# Patient Record
Sex: MALE | Race: BLACK OR AFRICAN AMERICAN | Employment: FULL TIME | URBAN - METROPOLITAN AREA
[De-identification: names, ages, dates, MRNs, and addresses within clinical notes are randomized per-mention and may not be internally consistent; named-entity substitution may affect disease eponyms.]

---

## 2017-03-06 ENCOUNTER — APPOINTMENT (OUTPATIENT)
Dept: GENERAL RADIOLOGY | Age: 42
End: 2017-03-06
Payer: SELF-PAY

## 2017-03-06 ENCOUNTER — HOSPITAL ENCOUNTER (EMERGENCY)
Age: 42
Discharge: HOME | End: 2017-03-07
Attending: EMERGENCY MEDICINE
Payer: SELF-PAY

## 2017-03-06 VITALS
HEART RATE: 92 BPM | RESPIRATION RATE: 17 BRPM | WEIGHT: 163 LBS | DIASTOLIC BLOOD PRESSURE: 92 MMHG | OXYGEN SATURATION: 99 % | SYSTOLIC BLOOD PRESSURE: 127 MMHG | TEMPERATURE: 98.2 F | BODY MASS INDEX: 22.08 KG/M2 | HEIGHT: 72 IN

## 2017-03-06 DIAGNOSIS — K65.1 ABSCESS OF MALE PELVIS (HCC): Primary | ICD-10-CM

## 2017-03-06 PROCEDURE — 77030019895 HC PCKNG STRP IODO -A

## 2017-03-06 PROCEDURE — 74011250636 HC RX REV CODE- 250/636: Performed by: PHYSICIAN ASSISTANT

## 2017-03-06 PROCEDURE — 96375 TX/PRO/DX INJ NEW DRUG ADDON: CPT | Performed by: PHYSICIAN ASSISTANT

## 2017-03-06 PROCEDURE — 99283 EMERGENCY DEPT VISIT LOW MDM: CPT | Performed by: PHYSICIAN ASSISTANT

## 2017-03-06 PROCEDURE — 96365 THER/PROPH/DIAG IV INF INIT: CPT | Performed by: PHYSICIAN ASSISTANT

## 2017-03-06 PROCEDURE — 75810000289 HC I&D ABSCESS SIMP/COMP/MULT: Performed by: PHYSICIAN ASSISTANT

## 2017-03-06 PROCEDURE — 73502 X-RAY EXAM HIP UNI 2-3 VIEWS: CPT

## 2017-03-06 RX ORDER — HYDROMORPHONE HYDROCHLORIDE 1 MG/ML
1 INJECTION, SOLUTION INTRAMUSCULAR; INTRAVENOUS; SUBCUTANEOUS
Status: COMPLETED | OUTPATIENT
Start: 2017-03-06 | End: 2017-03-07

## 2017-03-07 LAB
ALBUMIN SERPL BCP-MCNC: 3.4 G/DL (ref 3.5–5)
ALBUMIN/GLOB SERPL: 0.7 {RATIO} (ref 1.2–3.5)
ALP SERPL-CCNC: 83 U/L (ref 50–136)
ALT SERPL-CCNC: 22 U/L (ref 12–65)
ANION GAP BLD CALC-SCNC: 5 MMOL/L (ref 7–16)
AST SERPL W P-5'-P-CCNC: 15 U/L (ref 15–37)
BASOPHILS # BLD AUTO: 0 K/UL (ref 0–0.2)
BASOPHILS # BLD: 0 % (ref 0–2)
BILIRUB SERPL-MCNC: 0.4 MG/DL (ref 0.2–1.1)
BUN SERPL-MCNC: 9 MG/DL (ref 6–23)
CALCIUM SERPL-MCNC: 9.1 MG/DL (ref 8.3–10.4)
CHLORIDE SERPL-SCNC: 106 MMOL/L (ref 98–107)
CO2 SERPL-SCNC: 32 MMOL/L (ref 21–32)
CREAT SERPL-MCNC: 0.93 MG/DL (ref 0.8–1.5)
DIFFERENTIAL METHOD BLD: ABNORMAL
EOSINOPHIL # BLD: 0.2 K/UL (ref 0–0.8)
EOSINOPHIL NFR BLD: 2 % (ref 0.5–7.8)
ERYTHROCYTE [DISTWIDTH] IN BLOOD BY AUTOMATED COUNT: 13.1 % (ref 11.9–14.6)
GLOBULIN SER CALC-MCNC: 4.7 G/DL (ref 2.3–3.5)
GLUCOSE SERPL-MCNC: 88 MG/DL (ref 65–100)
HCT VFR BLD AUTO: 33.6 % (ref 41.1–50.3)
HGB BLD-MCNC: 11 G/DL (ref 13.6–17.2)
IMM GRANULOCYTES # BLD: 0 K/UL (ref 0–0.5)
IMM GRANULOCYTES NFR BLD AUTO: 0.3 % (ref 0–5)
LACTATE BLD-SCNC: 0.6 MMOL/L (ref 0.5–1.9)
LYMPHOCYTES # BLD AUTO: 24 % (ref 13–44)
LYMPHOCYTES # BLD: 2.3 K/UL (ref 0.5–4.6)
MCH RBC QN AUTO: 28 PG (ref 26.1–32.9)
MCHC RBC AUTO-ENTMCNC: 32.7 G/DL (ref 31.4–35)
MCV RBC AUTO: 85.5 FL (ref 79.6–97.8)
MONOCYTES # BLD: 0.7 K/UL (ref 0.1–1.3)
MONOCYTES NFR BLD AUTO: 7 % (ref 4–12)
NEUTS SEG # BLD: 6.5 K/UL (ref 1.7–8.2)
NEUTS SEG NFR BLD AUTO: 67 % (ref 43–78)
PLATELET # BLD AUTO: 311 K/UL (ref 150–450)
PMV BLD AUTO: 10.4 FL (ref 10.8–14.1)
POTASSIUM SERPL-SCNC: 3.5 MMOL/L (ref 3.5–5.1)
PROT SERPL-MCNC: 8.1 G/DL (ref 6.3–8.2)
RBC # BLD AUTO: 3.93 M/UL (ref 4.23–5.67)
SODIUM SERPL-SCNC: 143 MMOL/L (ref 136–145)
WBC # BLD AUTO: 9.6 K/UL (ref 4.3–11.1)

## 2017-03-07 PROCEDURE — 74011250636 HC RX REV CODE- 250/636: Performed by: PHYSICIAN ASSISTANT

## 2017-03-07 PROCEDURE — 83605 ASSAY OF LACTIC ACID: CPT

## 2017-03-07 PROCEDURE — 74011000258 HC RX REV CODE- 258: Performed by: PHYSICIAN ASSISTANT

## 2017-03-07 PROCEDURE — 85025 COMPLETE CBC W/AUTO DIFF WBC: CPT | Performed by: PHYSICIAN ASSISTANT

## 2017-03-07 PROCEDURE — 80053 COMPREHEN METABOLIC PANEL: CPT | Performed by: PHYSICIAN ASSISTANT

## 2017-03-07 RX ORDER — HYDROCODONE BITARTRATE AND ACETAMINOPHEN 5; 325 MG/1; MG/1
1 TABLET ORAL
Qty: 10 TAB | Refills: 0 | Status: SHIPPED | OUTPATIENT
Start: 2017-03-07 | End: 2017-11-06 | Stop reason: CLARIF

## 2017-03-07 RX ORDER — SULFAMETHOXAZOLE AND TRIMETHOPRIM 800; 160 MG/1; MG/1
1 TABLET ORAL 2 TIMES DAILY
Qty: 20 TAB | Refills: 0 | Status: SHIPPED | OUTPATIENT
Start: 2017-03-07 | End: 2017-03-17

## 2017-03-07 RX ADMIN — CEFTRIAXONE 1 G: 1 INJECTION, POWDER, FOR SOLUTION INTRAMUSCULAR; INTRAVENOUS at 00:04

## 2017-03-07 RX ADMIN — HYDROMORPHONE HYDROCHLORIDE 1 MG: 1 INJECTION, SOLUTION INTRAMUSCULAR; INTRAVENOUS; SUBCUTANEOUS at 00:05

## 2017-03-07 NOTE — DISCHARGE INSTRUCTIONS
Leave packing and bandage in place until seen for wound check in 2 days. Return to ER for wound check in 2 days. Sooner if needed. Skin Abscess: Care Instructions  Your Care Instructions    A skin abscess is a bacterial infection that forms a pocket of pus. A boil is a kind of skin abscess. The doctor may have cut an opening in the abscess so that the pus can drain out. You may have gauze in the cut so that the abscess will stay open and keep draining. You may need antibiotics. You will need to follow up with your doctor to make sure the infection has gone away. The doctor has checked you carefully, but problems can develop later. If you notice any problems or new symptoms, get medical treatment right away. Follow-up care is a key part of your treatment and safety. Be sure to make and go to all appointments, and call your doctor if you are having problems. It's also a good idea to know your test results and keep a list of the medicines you take. How can you care for yourself at home? · Apply warm and dry compresses, a heating pad set on low, or a hot water bottle 3 or 4 times a day for pain. Keep a cloth between the heat source and your skin. · If your doctor prescribed antibiotics, take them as directed. Do not stop taking them just because you feel better. You need to take the full course of antibiotics. · Take pain medicines exactly as directed. ¨ If the doctor gave you a prescription medicine for pain, take it as prescribed. ¨ If you are not taking a prescription pain medicine, ask your doctor if you can take an over-the-counter medicine. · Keep your bandage clean and dry. Change the bandage whenever it gets wet or dirty, or at least one time a day. · If the abscess was packed with gauze:  ¨ Keep follow-up appointments to have the gauze changed or removed. If the doctor instructed you to remove the gauze, gently pull out all of the gauze when your doctor tells you to.   ¨ After the gauze is removed, soak the area in warm water for 15 to 20 minutes 2 times a day, until the wound closes. When should you call for help? Call your doctor now or seek immediate medical care if:  · You have signs of worsening infection, such as:  ¨ Increased pain, swelling, warmth, or redness. ¨ Red streaks leading from the infected skin. ¨ Pus draining from the wound. ¨ A fever. Watch closely for changes in your health, and be sure to contact your doctor if:  · You do not get better as expected. Where can you learn more? Go to http://lakshmi-deysi.info/. Enter W465 in the search box to learn more about \"Skin Abscess: Care Instructions. \"  Current as of: February 5, 2016  Content Version: 11.1  © 5311-7222 [employer]. Care instructions adapted under license by [employer] (which disclaims liability or warranty for this information). If you have questions about a medical condition or this instruction, always ask your healthcare professional. [address] any warranty or liability for your use of this information.

## 2017-03-07 NOTE — ED PROVIDER NOTES
HPI Comments: 50-year-old -American male presents with large, inflamed, painful red abscess to his left lateral pelvis area which she states has progressively, rapidly worsened over the last 3 days and is more painful. She denies any chills, fever, nausea or vomiting. Patient is a 37 y.o. male presenting with abscess. The history is provided by the patient. Abscess    This is a new problem. The current episode started more than 2 days ago (3 DAYS AGO). The problem has been rapidly worsening. The problem is associated with an unknown factor. There has been no fever. Affected Location: LEFT LATERAL PELVIS. The pain is at a severity of 10/10. The pain is severe. The pain has been constant since onset. Associated symptoms include pain. Pertinent negatives include no blisters, no itching and no weeping. He has tried nothing for the symptoms. Past Medical History:   Diagnosis Date    GERD (gastroesophageal reflux disease)        Past Surgical History:   Procedure Laterality Date    HX OTHER SURGICAL      hernia          History reviewed. No pertinent family history. Social History     Social History    Marital status:      Spouse name: N/A    Number of children: N/A    Years of education: N/A     Occupational History    Not on file. Social History Main Topics    Smoking status: Current Every Day Smoker     Packs/day: 0.50    Smokeless tobacco: Never Used    Alcohol use No      Comment: seldom    Drug use: Yes     Special: Heroin, Cocaine      Comment: sniffing;  denies IV use    Sexual activity: Not on file     Other Topics Concern    Not on file     Social History Narrative         ALLERGIES: Review of patient's allergies indicates no known allergies. Review of Systems   Constitutional: Negative for chills and fever. Gastrointestinal: Negative for nausea and vomiting. Skin: Negative for itching. Abscess   Neurological: Negative for light-headedness. Psychiatric/Behavioral: The patient is nervous/anxious. Vitals:    03/06/17 2126   BP: (!) 127/92   Pulse: 92   Resp: 17   Temp: 98.2 °F (36.8 °C)   SpO2: 99%   Weight: 73.9 kg (163 lb)   Height: 6' (1.829 m)            Physical Exam   Constitutional: He is oriented to person, place, and time. Vital signs are normal. He appears well-developed and well-nourished. He is active. Non-toxic appearance. He does not appear ill. No distress. Pt. Is ambulatory and moving about in exam room without difficulty. Cardiovascular: Normal rate, regular rhythm and normal heart sounds. Exam reveals no gallop and no friction rub. No murmur heard. Pulmonary/Chest: Effort normal and breath sounds normal. No accessory muscle usage. No respiratory distress. He has no decreased breath sounds. He has no wheezes. He has no rhonchi. Abdominal: Soft. Bowel sounds are normal. He exhibits no distension. There is no tenderness. Musculoskeletal:        Left hip: Normal. He exhibits normal range of motion, no tenderness, no bony tenderness, no swelling, no crepitus and no deformity. Neurological: He is alert and oriented to person, place, and time. Skin: Skin is warm. Lesion noted. There is erythema. Psychiatric: His speech is normal and behavior is normal. His mood appears anxious. Nursing note and vitals reviewed. MDM  Number of Diagnoses or Management Options  Abscess of male pelvis [facility]): new and requires workup  Diagnosis management comments: Labs performed due to extensiveness of abscess. Labs are within normal limits. Patient is prescribed Bactrim and Norco for pain. Patient is advised to return to the ER in 2 days for wound check. He is advised to leave the packing and bandage in place.        Amount and/or Complexity of Data Reviewed  Clinical lab tests: ordered and reviewed    Risk of Complications, Morbidity, and/or Mortality  Presenting problems: moderate  Diagnostic procedures: low  Management options: moderate    Patient Progress  Patient progress: improved    ED Course       I&D Abcess Complex  Date/Time: 3/6/2017 10:50 PM  Performed by: Ricardo Boo  Authorized by: Ricardo Boo     Consent:     Consent obtained:  Verbal and written    Consent given by:  Patient    Risks discussed:  Bleeding, incomplete drainage, pain and infection  Location:     Type:  Abscess    Size:  VERY LARGE    Location: LEFT LATERAL PELVIS. Pre-procedure details:     Skin preparation:  Betadine  Procedure type:     Complexity:  Complex  Procedure details:     Needle aspiration: no      Incision types:  Stab incision    Incision depth:  Subcutaneous    Scalpel blade:  11    Wound management:  Probed and deloculated    Drainage:  Purulent    Drainage amount:  Copious    Wound treatment:  Wound left open    Packing materials:  1/2 in iodoform gauze  Post-procedure details:     Patient tolerance of procedure: Tolerated with difficulty  Comments:      Pt. Intolerant of complete I&D. Large amount of purulent drainage from wound, however, unable to completely evacuate drainage due to pain and intolerance of procedure. I discussed with patient and wife who is at the bedside that pt.  Will need to follow up with General Surgery as abscessed area was quite deep and I was not able to evacuate all of abscess material.        Recent Results (from the past 12 hour(s))   CBC WITH AUTOMATED DIFF    Collection Time: 03/07/17 12:00 AM   Result Value Ref Range    WBC 9.6 4.3 - 11.1 K/uL    RBC 3.93 (L) 4.23 - 5.67 M/uL    HGB 11.0 (L) 13.6 - 17.2 g/dL    HCT 33.6 (L) 41.1 - 50.3 %    MCV 85.5 79.6 - 97.8 FL    MCH 28.0 26.1 - 32.9 PG    MCHC 32.7 31.4 - 35.0 g/dL    RDW 13.1 11.9 - 14.6 %    PLATELET 328 778 - 692 K/uL    MPV 10.4 (L) 10.8 - 14.1 FL    DF AUTOMATED      NEUTROPHILS 67 43 - 78 %    LYMPHOCYTES 24 13 - 44 %    MONOCYTES 7 4.0 - 12.0 %    EOSINOPHILS 2 0.5 - 7.8 %    BASOPHILS 0 0.0 - 2.0 %    IMMATURE GRANULOCYTES 0.3 0.0 - 5.0 %    ABS. NEUTROPHILS 6.5 1.7 - 8.2 K/UL    ABS. LYMPHOCYTES 2.3 0.5 - 4.6 K/UL    ABS. MONOCYTES 0.7 0.1 - 1.3 K/UL    ABS. EOSINOPHILS 0.2 0.0 - 0.8 K/UL    ABS. BASOPHILS 0.0 0.0 - 0.2 K/UL    ABS. IMM. GRANS. 0.0 0.0 - 0.5 K/UL   METABOLIC PANEL, COMPREHENSIVE    Collection Time: 03/07/17 12:00 AM   Result Value Ref Range    Sodium 143 136 - 145 mmol/L    Potassium 3.5 3.5 - 5.1 mmol/L    Chloride 106 98 - 107 mmol/L    CO2 32 21 - 32 mmol/L    Anion gap 5 (L) 7 - 16 mmol/L    Glucose 88 65 - 100 mg/dL    BUN 9 6 - 23 MG/DL    Creatinine 0.93 0.8 - 1.5 MG/DL    GFR est AA >60 >60 ml/min/1.73m2    GFR est non-AA >60 >60 ml/min/1.73m2    Calcium 9.1 8.3 - 10.4 MG/DL    Bilirubin, total 0.4 0.2 - 1.1 MG/DL    ALT (SGPT) 22 12 - 65 U/L    AST (SGOT) 15 15 - 37 U/L    Alk. phosphatase 83 50 - 136 U/L    Protein, total 8.1 6.3 - 8.2 g/dL    Albumin 3.4 (L) 3.5 - 5.0 g/dL    Globulin 4.7 (H) 2.3 - 3.5 g/dL    A-G Ratio 0.7 (L) 1.2 - 3.5     POC LACTIC ACID    Collection Time: 03/07/17 12:11 AM   Result Value Ref Range    Lactic Acid (POC) 0.6 0.5 - 1.9 mmol/L        I discussed the results of all labs, procedures, radiographs, and treatments with the patient and available family. Treatment plan is agreed upon and the patient is ready for discharge. Questions about treatment in the ED and differential diagnosis of presenting condition were answered. Patient was given verbal discharge instructions including, but not limited to, importance of returning to the emergency department for any concern of worsening or continued symptoms. Instructions were given to follow up with a primary care provider or specialist within 1-2 days. Adverse effects of medications, if prescribed, were discussed and patient was advised to refrain from significant physical activity until followed up by primary care physician and to not drive or operate heavy machinery after taking any sedating substances.

## 2017-03-07 NOTE — ED NOTES
I have reviewed discharge instructions with the patient. The patient verbalized understanding. Patient was given prescription. Pt ambulatory upon discharge home.

## 2017-03-08 ENCOUNTER — HOSPITAL ENCOUNTER (EMERGENCY)
Age: 42
Discharge: HOME | End: 2017-03-08
Attending: EMERGENCY MEDICINE
Payer: SELF-PAY

## 2017-03-08 VITALS
OXYGEN SATURATION: 98 % | HEART RATE: 95 BPM | WEIGHT: 163 LBS | SYSTOLIC BLOOD PRESSURE: 145 MMHG | TEMPERATURE: 98 F | DIASTOLIC BLOOD PRESSURE: 78 MMHG | RESPIRATION RATE: 14 BRPM | HEIGHT: 72 IN | BODY MASS INDEX: 22.08 KG/M2

## 2017-03-08 DIAGNOSIS — Z51.89 WOUND CHECK, ABSCESS: Primary | ICD-10-CM

## 2017-03-08 PROCEDURE — 99283 EMERGENCY DEPT VISIT LOW MDM: CPT | Performed by: PHYSICIAN ASSISTANT

## 2017-03-08 NOTE — ED PROVIDER NOTES
Patient is a 37 y.o. male presenting with wound check. The history is provided by the patient. Wound Check    This is a new problem. The current episode started 2 days ago. The problem occurs constantly. The problem has been gradually improving. Pain location: left buttock. The quality of the pain is described as aching. The pain is at a severity of 8/10. The pain is mild. The symptoms are aggravated by activity and palpation. Treatments tried: I&D, abx. The treatment provided significant relief. There has been no history of extremity trauma. Past Medical History:   Diagnosis Date    GERD (gastroesophageal reflux disease)        Past Surgical History:   Procedure Laterality Date    HX OTHER SURGICAL      hernia          History reviewed. No pertinent family history. Social History     Social History    Marital status:      Spouse name: N/A    Number of children: N/A    Years of education: N/A     Occupational History    Not on file. Social History Main Topics    Smoking status: Current Every Day Smoker     Packs/day: 0.50    Smokeless tobacco: Never Used    Alcohol use No      Comment: seldom    Drug use: Yes     Special: Heroin, Cocaine      Comment: sniffing;  denies IV use    Sexual activity: Not on file     Other Topics Concern    Not on file     Social History Narrative         ALLERGIES: Review of patient's allergies indicates no known allergies. Review of Systems   All other systems reviewed and are negative. Vitals:    03/08/17 1501   BP: (!) 151/93   Pulse: 96   Resp: 18   Temp: 98.2 °F (36.8 °C)   SpO2: 97%   Weight: 73.9 kg (163 lb)   Height: 6' (1.829 m)            Physical Exam   Constitutional: He is oriented to person, place, and time. He appears well-developed and well-nourished. No distress. HENT:   Head: Normocephalic and atraumatic. Eyes: Conjunctivae and EOM are normal. Pupils are equal, round, and reactive to light. Neck: Normal range of motion. Neck supple. Cardiovascular: Normal rate and regular rhythm. Pulmonary/Chest: Effort normal and breath sounds normal. No respiratory distress. He has no wheezes. Abdominal: Soft. Bowel sounds are normal.   Musculoskeletal: He exhibits no edema. Left buttocks with packing in place this was removed small amount of drainage noted dressing placed    Neurological: He is alert and oriented to person, place, and time. Skin: Skin is warm. Nursing note and vitals reviewed.        MDM  Number of Diagnoses or Management Options  Diagnosis management comments: 2 days s/p I&D left buttocks wound, pt to continue meds, dressing material given        Amount and/or Complexity of Data Reviewed  Review and summarize past medical records: yes    Risk of Complications, Morbidity, and/or Mortality  Presenting problems: low  Diagnostic procedures: low  Management options: low    Patient Progress  Patient progress: improved    ED Course       Procedures

## 2017-03-08 NOTE — LETTER
[facility] [facility] EMERGENCY DEPT One [address] [id number] [phone] Work/School Note Date: 3/8/2017 To Whom It May concern: 
 
Pedro Massey was seen and treated today in the emergency room by the following provider(s): 
Attending Provider: Christopher Reyes MD 
Physician Assistant: NELLY Lopez. Pedro Massey may return to work on 3-10-17. Sincerely, NELLY Lopez

## 2017-03-08 NOTE — DISCHARGE INSTRUCTIONS
Wound Check: Care Instructions  Your Care Instructions  People have wounds that need care for many reasons. You may have a cut that needs care after surgery. You may have a cut or puncture wound from an accident. Or you may have a wound because of a condition like diabetes. Whatever the cause of your wound, there are things you can do to care for it at home. Your doctor may also want you to come back for a wound check. The wound check lets the doctor know how your wound is healing and if you need more treatment. Follow-up care is a key part of your treatment and safety. Be sure to make and go to all appointments, and call your doctor if you are having problems. It's also a good idea to know your test results and keep a list of the medicines you take. How can you care for yourself at home? · If your doctor told you how to care for your wound, follow your doctor's instructions. If you did not get instructions, follow this general advice:  ¨ You may cover the wound with a thin layer of petroleum jelly, such as Vaseline, and a nonstick bandage. ¨ Apply more petroleum jelly and replace the bandage as needed. · Keep the wound dry for the first 24 to 48 hours. After this, you can shower if your doctor okays it. Pat the wound dry. · Be safe with medicines. Read and follow all instructions on the label. ¨ If the doctor gave you a prescription medicine for pain, take it as prescribed. ¨ If you are not taking a prescription pain medicine, ask your doctor if you can take an over-the-counter medicine. · If your doctor prescribed antibiotics, take them as directed. Do not stop taking them just because you feel better. You need to take the full course of antibiotics. · If you have stitches, do not remove them on your own. Your doctor will tell you when to come back to have them removed. · If you have Steri-Strips, leave them on until they fall off.   · If possible, prop up the injured area on a pillow anytime you sit or lie down during the next 3 days. Try to keep it above the level of your heart. This will help reduce swelling. When should you call for help? Call your doctor now or seek immediate medical care if:  · You have new pain, or the pain gets worse. · The skin near the wound is cold or pale or changes color. · You have tingling, weakness, or numbness near the wound. · The wound starts to bleed, and blood soaks through the bandage. Oozing small amounts of blood is normal.  · You have symptoms of infection, such as:  ¨ Increased pain, swelling, warmth, or redness. ¨ Red streaks leading from the wound. ¨ Pus draining from the wound. ¨ A fever. Watch closely for changes in your health, and be sure to contact your doctor if:  · You do not get better as expected. Where can you learn more? Go to http://lakshmi-deysi.info/. Enter P342 in the search box to learn more about \"Wound Check: Care Instructions. \"  Current as of: May 27, 2016  Content Version: 11.1  © 1888-8518 [employer]. Care instructions adapted under license by [employer] (which disclaims liability or warranty for this information). If you have questions about a medical condition or this instruction, always ask your healthcare professional. [address] any warranty or liability for your use of this information.

## 2017-11-06 ENCOUNTER — APPOINTMENT (OUTPATIENT)
Dept: GENERAL RADIOLOGY | Age: 42
End: 2017-11-06
Attending: EMERGENCY MEDICINE
Payer: SELF-PAY

## 2017-11-06 ENCOUNTER — HOSPITAL ENCOUNTER (EMERGENCY)
Age: 42
Discharge: HOME | End: 2017-11-06
Attending: EMERGENCY MEDICINE
Payer: SELF-PAY

## 2017-11-06 ENCOUNTER — HOSPITAL ENCOUNTER (EMERGENCY)
Age: 42
Discharge: LEFT BEFORE DISCHARGE | End: 2017-11-06
Attending: EMERGENCY MEDICINE
Payer: SELF-PAY

## 2017-11-06 ENCOUNTER — HOSPITAL ENCOUNTER (EMERGENCY)
Age: 42
Discharge: STILL ADMITTED OR ADMINISTRATIVE | End: 2017-11-06
Attending: EMERGENCY MEDICINE
Payer: SELF-PAY

## 2017-11-06 VITALS
OXYGEN SATURATION: 98 % | HEART RATE: 90 BPM | RESPIRATION RATE: 18 BRPM | SYSTOLIC BLOOD PRESSURE: 110 MMHG | DIASTOLIC BLOOD PRESSURE: 82 MMHG | TEMPERATURE: 99.2 F

## 2017-11-06 DIAGNOSIS — R50.9 FEVER, UNSPECIFIED FEVER CAUSE: Primary | ICD-10-CM

## 2017-11-06 DIAGNOSIS — R05.9 COUGH: ICD-10-CM

## 2017-11-06 LAB
ALBUMIN SERPL-MCNC: 3.5 G/DL (ref 3.5–5)
ALBUMIN/GLOB SERPL: 0.7 {RATIO} (ref 1.2–3.5)
ALP SERPL-CCNC: 87 U/L (ref 50–136)
ALT SERPL-CCNC: 28 U/L (ref 12–65)
ANION GAP SERPL CALC-SCNC: 7 MMOL/L (ref 7–16)
AST SERPL-CCNC: 32 U/L (ref 15–37)
BACTERIA URNS QL MICRO: 0 /HPF
BASOPHILS # BLD: 0 K/UL (ref 0–0.2)
BASOPHILS NFR BLD: 0 % (ref 0–2)
BILIRUB SERPL-MCNC: 0.6 MG/DL (ref 0.2–1.1)
BUN SERPL-MCNC: 8 MG/DL (ref 6–23)
CALCIUM SERPL-MCNC: 9.3 MG/DL (ref 8.3–10.4)
CASTS URNS QL MICRO: 0 /LPF
CHLORIDE SERPL-SCNC: 100 MMOL/L (ref 98–107)
CO2 SERPL-SCNC: 30 MMOL/L (ref 21–32)
CREAT SERPL-MCNC: 1.05 MG/DL (ref 0.8–1.5)
CRYSTALS URNS QL MICRO: NORMAL /LPF
DIFFERENTIAL METHOD BLD: ABNORMAL
EOSINOPHIL # BLD: 0.1 K/UL (ref 0–0.8)
EOSINOPHIL NFR BLD: 1 % (ref 0.5–7.8)
EPI CELLS #/AREA URNS HPF: NORMAL /HPF
ERYTHROCYTE [DISTWIDTH] IN BLOOD BY AUTOMATED COUNT: 13.1 % (ref 11.9–14.6)
FLUAV AG NPH QL IA: NEGATIVE
FLUBV AG NPH QL IA: NEGATIVE
GLOBULIN SER CALC-MCNC: 5.2 G/DL (ref 2.3–3.5)
GLUCOSE SERPL-MCNC: 107 MG/DL (ref 65–100)
HCT VFR BLD AUTO: 36.6 % (ref 41.1–50.3)
HGB BLD-MCNC: 12.2 G/DL (ref 13.6–17.2)
IMM GRANULOCYTES # BLD: 0 K/UL (ref 0–0.5)
IMM GRANULOCYTES NFR BLD: 0 % (ref 0–5)
LACTATE BLD-SCNC: 0.7 MMOL/L (ref 0.5–1.9)
LYMPHOCYTES # BLD: 1.2 K/UL (ref 0.5–4.6)
LYMPHOCYTES NFR BLD: 14 % (ref 13–44)
MCH RBC QN AUTO: 27.8 PG (ref 26.1–32.9)
MCHC RBC AUTO-ENTMCNC: 33.3 G/DL (ref 31.4–35)
MCV RBC AUTO: 83.4 FL (ref 79.6–97.8)
MONOCYTES # BLD: 0.6 K/UL (ref 0.1–1.3)
MONOCYTES NFR BLD: 7 % (ref 4–12)
MUCOUS THREADS URNS QL MICRO: 0 /LPF
NEUTS SEG # BLD: 6.8 K/UL (ref 1.7–8.2)
NEUTS SEG NFR BLD: 78 % (ref 43–78)
OTHER OBSERVATIONS,UCOM: NORMAL
PLATELET # BLD AUTO: 277 K/UL (ref 150–450)
PMV BLD AUTO: 10 FL (ref 10.8–14.1)
POTASSIUM SERPL-SCNC: 3.6 MMOL/L (ref 3.5–5.1)
PROT SERPL-MCNC: 8.7 G/DL (ref 6.3–8.2)
RBC # BLD AUTO: 4.39 M/UL (ref 4.23–5.67)
RBC #/AREA URNS HPF: NORMAL /HPF
SODIUM SERPL-SCNC: 137 MMOL/L (ref 136–145)
WBC # BLD AUTO: 8.8 K/UL (ref 4.3–11.1)
WBC URNS QL MICRO: NORMAL /HPF

## 2017-11-06 PROCEDURE — 83605 ASSAY OF LACTIC ACID: CPT

## 2017-11-06 PROCEDURE — 96361 HYDRATE IV INFUSION ADD-ON: CPT | Performed by: EMERGENCY MEDICINE

## 2017-11-06 PROCEDURE — 75810000275 HC EMERGENCY DEPT VISIT NO LEVEL OF CARE: Performed by: EMERGENCY MEDICINE

## 2017-11-06 PROCEDURE — 87804 INFLUENZA ASSAY W/OPTIC: CPT | Performed by: EMERGENCY MEDICINE

## 2017-11-06 PROCEDURE — 96374 THER/PROPH/DIAG INJ IV PUSH: CPT | Performed by: EMERGENCY MEDICINE

## 2017-11-06 PROCEDURE — 71020 XR CHEST PA LAT: CPT

## 2017-11-06 PROCEDURE — 74011250637 HC RX REV CODE- 250/637: Performed by: EMERGENCY MEDICINE

## 2017-11-06 PROCEDURE — 85025 COMPLETE CBC W/AUTO DIFF WBC: CPT | Performed by: EMERGENCY MEDICINE

## 2017-11-06 PROCEDURE — 81003 URINALYSIS AUTO W/O SCOPE: CPT | Performed by: EMERGENCY MEDICINE

## 2017-11-06 PROCEDURE — 80053 COMPREHEN METABOLIC PANEL: CPT | Performed by: EMERGENCY MEDICINE

## 2017-11-06 PROCEDURE — 81015 MICROSCOPIC EXAM OF URINE: CPT | Performed by: EMERGENCY MEDICINE

## 2017-11-06 PROCEDURE — 74011250636 HC RX REV CODE- 250/636: Performed by: EMERGENCY MEDICINE

## 2017-11-06 PROCEDURE — 99284 EMERGENCY DEPT VISIT MOD MDM: CPT | Performed by: EMERGENCY MEDICINE

## 2017-11-06 RX ORDER — ACETAMINOPHEN 500 MG
1000 TABLET ORAL
Status: COMPLETED | OUTPATIENT
Start: 2017-11-06 | End: 2017-11-06

## 2017-11-06 RX ORDER — KETOROLAC TROMETHAMINE 30 MG/ML
15 INJECTION, SOLUTION INTRAMUSCULAR; INTRAVENOUS
Status: COMPLETED | OUTPATIENT
Start: 2017-11-06 | End: 2017-11-06

## 2017-11-06 RX ADMIN — SODIUM CHLORIDE 1000 ML: 900 INJECTION, SOLUTION INTRAVENOUS at 19:56

## 2017-11-06 RX ADMIN — ACETAMINOPHEN 1000 MG: 500 TABLET, FILM COATED ORAL at 19:57

## 2017-11-06 RX ADMIN — KETOROLAC TROMETHAMINE 15 MG: 30 INJECTION, SOLUTION INTRAMUSCULAR at 19:56

## 2017-11-06 NOTE — LETTER
[address] [facility] EMERGENCY DEPT One [address] [id number] [phone] Work/School Note Date: 11/6/2017 To Whom It May concern: 
 
Harjinder Serrano was seen and treated today in the emergency room by the following provider(s): 
Attending Provider: Bharat Burroughs MD. Harjinder Serrano may return to work on 11/8/17. Sincerely, Alex Mae RN

## 2017-11-07 NOTE — DISCHARGE INSTRUCTIONS
Learning About Fever  What is a fever? A fever is a high body temperature. It's one way your body fights being sick. A fever shows that the body is responding to infection or other illnesses, both minor and severe. A fever is a symptom, not an illness by itself. A fever can be a sign that you are ill, but most fevers are not caused by a serious problem. You may have a fever with a minor illness, such as a cold. But sometimes a very serious infection may cause little or no fever. It is important to look at other symptoms, other conditions you have, and how you feel in general. In children, notice how they act and see what symptoms they complain of. What is a normal body temperature? A normal body temperature is about 98. 6ºF. Some people have a normal temperature that is a little higher or a little lower than this. Your temperature may be a little lower in the morning than it is later in the day. It may go up during hot weather or when you exercise, wear heavy clothes, or take a hot bath. Your temperature may also be different depending on how you take it. A temperature taken in the mouth (oral) or under the arm may be a little lower than your core temperature (rectal). What is a fever temperature? A core temperature of 100.4°F or above is considered a fever. What can cause a fever? A fever may be caused by:  · Infections. This is the most common cause of a fever. Examples of infections that can cause a fever include the flu, a kidney infection, or pneumonia. · Some medicines. · Severe trauma or injury, such as a heart attack, stroke, heatstroke, or burns. · Other medical conditions, such as arthritis and some cancers. How can you treat a fever at home? · Ask your doctor if you can take an over-the-counter pain medicine, such as acetaminophen (Tylenol), ibuprofen (Advil, Motrin), or naproxen (Aleve). Be safe with medicines. Read and follow all instructions on the label.   · To prevent dehydration, drink plenty of fluids. Choose water and other caffeine-free clear liquids until you feel better. If you have kidney, heart, or liver disease and have to limit fluids, talk with your doctor before you increase the amount of fluids you drink. Follow-up care is a key part of your treatment and safety. Be sure to make and go to all appointments, and call your doctor if you are having problems. It's also a good idea to know your test results and keep a list of the medicines you take. Where can you learn more? Go to http://lakshmi-deysi.info/. Enter H621 in the search box to learn more about \"Learning About Fever. \"  Current as of: March 20, 2017  Content Version: 11.4  © 5020-7482 [employer]. Care instructions adapted under license by [employer] (which disclaims liability or warranty for this information). If you have questions about a medical condition or this instruction, always ask your healthcare professional. [place] [address] any warranty or liability for your use of this information. Cough: Care Instructions  Your Care Instructions    A cough is your body's response to something that bothers your throat or airways. Many things can cause a cough. You might cough because of a cold or the flu, bronchitis, or asthma. Smoking, postnasal drip, allergies, and stomach acid that backs up into your throat also can cause coughs. A cough is a symptom, not a disease. Most coughs stop when the cause, such as a cold, goes away. You can take a few steps at home to cough less and feel better. Follow-up care is a key part of your treatment and safety. Be sure to make and go to all appointments, and call your doctor if you are having problems. It's also a good idea to know your test results and keep a list of the medicines you take. How can you care for yourself at home? · Drink lots of water and other fluids.  This helps thin the mucus and soothes a dry or sore throat. Honey or lemon juice in hot water or tea may ease a dry cough. · Take cough medicine as directed by your doctor. · Prop up your head on pillows to help you breathe and ease a dry cough. · Try cough drops to soothe a dry or sore throat. Cough drops don't stop a cough. Medicine-flavored cough drops are no better than candy-flavored drops or hard candy. · Do not smoke. Avoid secondhand smoke. If you need help quitting, talk to your doctor about stop-smoking programs and medicines. These can increase your chances of quitting for good. When should you call for help? Call 911 anytime you think you may need emergency care. For example, call if:  ? · You have severe trouble breathing. ?Call your doctor now or seek immediate medical care if:  ? · You cough up blood. ? · You have new or worse trouble breathing. ? · You have a new or higher fever. ? · You have a new rash. ? Watch closely for changes in your health, and be sure to contact your doctor if:  ? · You cough more deeply or more often, especially if you notice more mucus or a change in the color of your mucus. ? · You have new symptoms, such as a sore throat, an earache, or sinus pain. ? · You do not get better as expected. Where can you learn more? Go to http://lakshmi-deysi.info/. Enter D279 in the search box to learn more about \"Cough: Care Instructions. \"  Current as of: May 12, 2017  Content Version: 11.4  © 1996-0338 [employer]. Care instructions adapted under license by [employer] (which disclaims liability or warranty for this information). If you have questions about a medical condition or this instruction, always ask your healthcare professional. [address] any warranty or liability for your use of this information.

## 2017-11-07 NOTE — ED PROVIDER NOTES
HPI Comments: Patient is a 39 yo male with no significant PMH who presents with fever, body aches and cough. States coughing up flem and body aches all began yesterday, states flem is yellow, non-bloody, no nausea or vomiting, no neck pain or stiffness, no headache, no chest pain. Patient well appearing. Patient is a 40 y.o. male presenting with flu symptoms. The history is provided by the patient. No  was used. Flu   Associated symptoms include chills and myalgias. Pertinent negatives include no chest pain, no headaches, no rhinorrhea, no sore throat, no shortness of breath, no nausea and no vomiting. Past Medical History:   Diagnosis Date    GERD (gastroesophageal reflux disease)        Past Surgical History:   Procedure Laterality Date    HX OTHER SURGICAL      hernia          History reviewed. No pertinent family history. Social History     Social History    Marital status:      Spouse name: N/A    Number of children: N/A    Years of education: N/A     Occupational History    Not on file. Social History Main Topics    Smoking status: Current Every Day Smoker     Packs/day: 0.50    Smokeless tobacco: Never Used    Alcohol use No      Comment: seldom    Drug use: Yes     Special: Heroin, Cocaine      Comment: sniffing;  denies IV use    Sexual activity: Not on file     Other Topics Concern    Not on file     Social History Narrative         ALLERGIES: Review of patient's allergies indicates no known allergies. Review of Systems   Constitutional: Positive for chills and fever. HENT: Negative for rhinorrhea and sore throat. Eyes: Negative for visual disturbance. Respiratory: Negative for cough and shortness of breath. Cardiovascular: Negative for chest pain and leg swelling. Gastrointestinal: Negative for abdominal pain, diarrhea, nausea and vomiting. Genitourinary: Negative for dysuria. Musculoskeletal: Positive for myalgias.  Negative for back pain and neck pain. Skin: Negative for rash. Neurological: Negative for weakness and headaches. Psychiatric/Behavioral: The patient is not nervous/anxious. Vitals:    11/06/17 1806   BP: (!) 135/92   Pulse: (!) 115   Resp: 16   Temp: (!) 101.3 °F (38.5 °C)   SpO2: 95%            Physical Exam   Constitutional: He is oriented to person, place, and time. He appears well-developed and well-nourished. HENT:   Head: Normocephalic. Right Ear: External ear normal.   Left Ear: External ear normal.   Eyes: Conjunctivae and EOM are normal. Pupils are equal, round, and reactive to light. Neck: Normal range of motion. Neck supple. No tracheal deviation present. Cardiovascular: Regular rhythm, normal heart sounds and intact distal pulses. No murmur heard. Pulmonary/Chest: Effort normal and breath sounds normal. No respiratory distress. Abdominal: Soft. He exhibits no distension. There is no tenderness. There is no rebound. Musculoskeletal: Normal range of motion. Neurological: He is alert and oriented to person, place, and time. No cranial nerve deficit. Skin: No rash noted. Nursing note and vitals reviewed.        MDM  Number of Diagnoses or Management Options     Amount and/or Complexity of Data Reviewed  Clinical lab tests: ordered and reviewed  Tests in the radiology section of CPT®: ordered and reviewed  Tests in the medicine section of CPT®: ordered and reviewed  Review and summarize past medical records: yes    Risk of Complications, Morbidity, and/or Mortality  Presenting problems: high  Diagnostic procedures: high  Management options: high    Patient Progress  Patient progress: stable    ED Course       Procedures     Recent Results (from the past 12 hour(s))   CBC WITH AUTOMATED DIFF    Collection Time: 11/06/17  6:11 PM   Result Value Ref Range    WBC 8.8 4.3 - 11.1 K/uL    RBC 4.39 4.23 - 5.67 M/uL    HGB 12.2 (L) 13.6 - 17.2 g/dL    HCT 36.6 (L) 41.1 - 50.3 %    MCV 83.4 79.6 - 97.8 FL    MCH 27.8 26.1 - 32.9 PG    MCHC 33.3 31.4 - 35.0 g/dL    RDW 13.1 11.9 - 14.6 %    PLATELET 440 998 - 915 K/uL    MPV 10.0 (L) 10.8 - 14.1 FL    DF AUTOMATED      NEUTROPHILS 78 43 - 78 %    LYMPHOCYTES 14 13 - 44 %    MONOCYTES 7 4.0 - 12.0 %    EOSINOPHILS 1 0.5 - 7.8 %    BASOPHILS 0 0.0 - 2.0 %    IMMATURE GRANULOCYTES 0 0.0 - 5.0 %    ABS. NEUTROPHILS 6.8 1.7 - 8.2 K/UL    ABS. LYMPHOCYTES 1.2 0.5 - 4.6 K/UL    ABS. MONOCYTES 0.6 0.1 - 1.3 K/UL    ABS. EOSINOPHILS 0.1 0.0 - 0.8 K/UL    ABS. BASOPHILS 0.0 0.0 - 0.2 K/UL    ABS. IMM. GRANS. 0.0 0.0 - 0.5 K/UL   METABOLIC PANEL, COMPREHENSIVE    Collection Time: 11/06/17  6:11 PM   Result Value Ref Range    Sodium 137 136 - 145 mmol/L    Potassium 3.6 3.5 - 5.1 mmol/L    Chloride 100 98 - 107 mmol/L    CO2 30 21 - 32 mmol/L    Anion gap 7 7 - 16 mmol/L    Glucose 107 (H) 65 - 100 mg/dL    BUN 8 6 - 23 MG/DL    Creatinine 1.05 0.8 - 1.5 MG/DL    GFR est AA >60 >60 ml/min/1.73m2    GFR est non-AA >60 >60 ml/min/1.73m2    Calcium 9.3 8.3 - 10.4 MG/DL    Bilirubin, total 0.6 0.2 - 1.1 MG/DL    ALT (SGPT) 28 12 - 65 U/L    AST (SGOT) 32 15 - 37 U/L    Alk.  phosphatase 87 50 - 136 U/L    Protein, total 8.7 (H) 6.3 - 8.2 g/dL    Albumin 3.5 3.5 - 5.0 g/dL    Globulin 5.2 (H) 2.3 - 3.5 g/dL    A-G Ratio 0.7 (L) 1.2 - 3.5     INFLUENZA A & B AG (RAPID TEST)    Collection Time: 11/06/17  6:11 PM   Result Value Ref Range    Influenza A Ag NEGATIVE  NEG      Influenza B Ag NEGATIVE  NEG     POC LACTIC ACID    Collection Time: 11/06/17  8:07 PM   Result Value Ref Range    Lactic Acid (POC) 0.7 0.5 - 1.9 mmol/L   URINE MICROSCOPIC    Collection Time: 11/06/17  8:12 PM   Result Value Ref Range    WBC 0-3 0 /hpf    RBC 20-50 0 /hpf    Epithelial cells 0-3 0 /hpf    Bacteria 0 0 /hpf    Casts 0 0 /lpf    Crystals, urine MODERATE 0 /LPF    Mucus 0 0 /lpf    Other observations RESULTS VERIFIED MANUALLY       Xr Chest Pa Lat    Result Date: 11/6/2017  PA LATERAL CHEST X-RAY HISTORY: 2 days of fever and cough COMPARISON: 2/17/2015 FINDINGS: The heart size is normal. There is no consolidation, pleural effusions, or pulmonary edema. A suspected bilateral in the left perihilar region appears unchanged. IMPRESSION: No consolidation. 41 yo male with fever and body aches:      Patient is VERY well appearing, standing, ambulatory in room on repeat assessment, eating crackers and drinking, VSS and symptoms consistent with flu like illness. Discussed importance of fluids and ibuprofen/tylenol and appropriate use of each. Discussed return with any worsening symptoms, any lethargy or LOC, any nausea or vomiting, any abdominal pain or neck pain or headaches or any further concerns. POC Lactic acid less than 1 today in ED.

## 2017-11-22 ENCOUNTER — APPOINTMENT (OUTPATIENT)
Dept: GENERAL RADIOLOGY | Age: 42
End: 2017-11-22
Attending: NURSE PRACTITIONER
Payer: SELF-PAY

## 2017-11-22 ENCOUNTER — HOSPITAL ENCOUNTER (EMERGENCY)
Age: 42
Discharge: HOME | End: 2017-11-22
Attending: EMERGENCY MEDICINE
Payer: SELF-PAY

## 2017-11-22 VITALS
SYSTOLIC BLOOD PRESSURE: 112 MMHG | DIASTOLIC BLOOD PRESSURE: 78 MMHG | HEART RATE: 94 BPM | HEIGHT: 72 IN | OXYGEN SATURATION: 98 % | BODY MASS INDEX: 21.94 KG/M2 | TEMPERATURE: 98.7 F | WEIGHT: 162 LBS | RESPIRATION RATE: 16 BRPM

## 2017-11-22 DIAGNOSIS — L02.91 ABSCESS: Primary | ICD-10-CM

## 2017-11-22 LAB
ALBUMIN SERPL-MCNC: 2.9 G/DL (ref 3.5–5)
ALBUMIN/GLOB SERPL: 0.5 {RATIO} (ref 1.2–3.5)
ALP SERPL-CCNC: 74 U/L (ref 50–136)
ALT SERPL-CCNC: 51 U/L (ref 12–65)
ANION GAP SERPL CALC-SCNC: 10 MMOL/L (ref 7–16)
AST SERPL-CCNC: 53 U/L (ref 15–37)
BASOPHILS # BLD: 0 K/UL (ref 0–0.2)
BASOPHILS NFR BLD: 0 % (ref 0–2)
BILIRUB SERPL-MCNC: 0.6 MG/DL (ref 0.2–1.1)
BUN SERPL-MCNC: 10 MG/DL (ref 6–23)
CALCIUM SERPL-MCNC: 9.2 MG/DL (ref 8.3–10.4)
CHLORIDE SERPL-SCNC: 103 MMOL/L (ref 98–107)
CO2 SERPL-SCNC: 24 MMOL/L (ref 21–32)
CREAT SERPL-MCNC: 0.88 MG/DL (ref 0.8–1.5)
CRP SERPL-MCNC: 6.4 MG/DL (ref 0–0.9)
DIFFERENTIAL METHOD BLD: ABNORMAL
EOSINOPHIL # BLD: 0.1 K/UL (ref 0–0.8)
EOSINOPHIL NFR BLD: 1 % (ref 0.5–7.8)
ERYTHROCYTE [DISTWIDTH] IN BLOOD BY AUTOMATED COUNT: 13.2 % (ref 11.9–14.6)
ERYTHROCYTE [SEDIMENTATION RATE] IN BLOOD: 98 MM/HR (ref 0–15)
GLOBULIN SER CALC-MCNC: 6 G/DL (ref 2.3–3.5)
GLUCOSE SERPL-MCNC: 91 MG/DL (ref 65–100)
HCT VFR BLD AUTO: 32.8 % (ref 41.1–50.3)
HGB BLD-MCNC: 11 G/DL (ref 13.6–17.2)
IMM GRANULOCYTES # BLD: 0 K/UL (ref 0–0.5)
IMM GRANULOCYTES NFR BLD AUTO: 0 % (ref 0–5)
LYMPHOCYTES # BLD: 2.4 K/UL (ref 0.5–4.6)
LYMPHOCYTES NFR BLD: 24 % (ref 13–44)
MCH RBC QN AUTO: 27.8 PG (ref 26.1–32.9)
MCHC RBC AUTO-ENTMCNC: 33.5 G/DL (ref 31.4–35)
MCV RBC AUTO: 82.8 FL (ref 79.6–97.8)
MONOCYTES # BLD: 1 K/UL (ref 0.1–1.3)
MONOCYTES NFR BLD: 9 % (ref 4–12)
NEUTS SEG # BLD: 6.7 K/UL (ref 1.7–8.2)
NEUTS SEG NFR BLD: 66 % (ref 43–78)
PLATELET # BLD AUTO: 541 K/UL (ref 150–450)
PMV BLD AUTO: 10.6 FL (ref 10.8–14.1)
POTASSIUM SERPL-SCNC: 5 MMOL/L (ref 3.5–5.1)
PROT SERPL-MCNC: 8.9 G/DL (ref 6.3–8.2)
RBC # BLD AUTO: 3.96 M/UL (ref 4.23–5.67)
SODIUM SERPL-SCNC: 137 MMOL/L (ref 136–145)
WBC # BLD AUTO: 10.2 K/UL (ref 4.3–11.1)

## 2017-11-22 PROCEDURE — 87205 SMEAR GRAM STAIN: CPT | Performed by: NURSE PRACTITIONER

## 2017-11-22 PROCEDURE — 87186 SC STD MICRODIL/AGAR DIL: CPT | Performed by: NURSE PRACTITIONER

## 2017-11-22 PROCEDURE — 80053 COMPREHEN METABOLIC PANEL: CPT | Performed by: NURSE PRACTITIONER

## 2017-11-22 PROCEDURE — 74011250637 HC RX REV CODE- 250/637: Performed by: EMERGENCY MEDICINE

## 2017-11-22 PROCEDURE — 85652 RBC SED RATE AUTOMATED: CPT | Performed by: NURSE PRACTITIONER

## 2017-11-22 PROCEDURE — 87075 CULTR BACTERIA EXCEPT BLOOD: CPT | Performed by: EMERGENCY MEDICINE

## 2017-11-22 PROCEDURE — 73030 X-RAY EXAM OF SHOULDER: CPT

## 2017-11-22 PROCEDURE — 86140 C-REACTIVE PROTEIN: CPT | Performed by: NURSE PRACTITIONER

## 2017-11-22 PROCEDURE — 75810000289 HC I&D ABSCESS SIMP/COMP/MULT: Performed by: NURSE PRACTITIONER

## 2017-11-22 PROCEDURE — 85025 COMPLETE CBC W/AUTO DIFF WBC: CPT | Performed by: NURSE PRACTITIONER

## 2017-11-22 PROCEDURE — 87077 CULTURE AEROBIC IDENTIFY: CPT | Performed by: NURSE PRACTITIONER

## 2017-11-22 PROCEDURE — 99283 EMERGENCY DEPT VISIT LOW MDM: CPT | Performed by: NURSE PRACTITIONER

## 2017-11-22 RX ORDER — SULFAMETHOXAZOLE AND TRIMETHOPRIM 800; 160 MG/1; MG/1
2 TABLET ORAL 2 TIMES DAILY
Qty: 20 TAB | Refills: 0 | Status: SHIPPED | OUTPATIENT
Start: 2017-11-22 | End: 2017-11-27

## 2017-11-22 RX ORDER — ACETAMINOPHEN AND CODEINE PHOSPHATE 300; 30 MG/1; MG/1
1 TABLET ORAL
Qty: 20 TAB | Refills: 0 | Status: SHIPPED | OUTPATIENT
Start: 2017-11-22 | End: 2018-12-08

## 2017-11-22 RX ORDER — OXYCODONE HYDROCHLORIDE 5 MG/1
5 TABLET ORAL
Status: COMPLETED | OUTPATIENT
Start: 2017-11-22 | End: 2017-11-22

## 2017-11-22 RX ORDER — SODIUM CHLORIDE 9 MG/ML
1000 INJECTION, SOLUTION INTRAVENOUS ONCE
Status: DISCONTINUED | OUTPATIENT
Start: 2017-11-22 | End: 2017-11-22

## 2017-11-22 RX ADMIN — OXYCODONE HYDROCHLORIDE 5 MG: 5 TABLET ORAL at 16:42

## 2017-11-22 NOTE — ED PROVIDER NOTES
HPI Comments: Patient presents with right shoulder pain and swelling that started 2-3 days ago. He denies known injury to his right shoulder. He states he has recently been moving and has been lifting heavy objects. He states pain radiates down his right arm making it difficult to raise his right arm. He states chills and sweats for the past 3 days also. He states unsure of fever because he has not checked his temperature. Patient is a 40 y.o. male presenting with shoulder pain. The history is provided by the patient. Shoulder Pain    The incident occurred 2 days ago. There was no injury mechanism. The right shoulder is affected. The pain is at a severity of 10/10. The pain is moderate. The pain has been constant since onset. The pain radiates. There is no history of shoulder injury. He has no other injuries. There is no history of shoulder surgery. Pertinent negatives include no numbness, no muscle weakness and no tingling. He reports no foreign bodies present. Past Medical History:   Diagnosis Date    GERD (gastroesophageal reflux disease)        Past Surgical History:   Procedure Laterality Date    HX OTHER SURGICAL      hernia          History reviewed. No pertinent family history. Social History     Social History    Marital status:      Spouse name: N/A    Number of children: N/A    Years of education: N/A     Occupational History    Not on file. Social History Main Topics    Smoking status: Current Every Day Smoker     Packs/day: 0.50    Smokeless tobacco: Never Used    Alcohol use No      Comment: seldom    Drug use: Yes     Special: Heroin, Cocaine      Comment: sniffing;  denies IV use    Sexual activity: Not on file     Other Topics Concern    Not on file     Social History Narrative         ALLERGIES: Review of patient's allergies indicates no known allergies. Review of Systems   Constitutional: Positive for chills, diaphoresis and fever.    Respiratory: Negative for cough and shortness of breath. Cardiovascular: Negative for chest pain. Gastrointestinal: Negative for abdominal pain, constipation, diarrhea, nausea and vomiting. Musculoskeletal: Positive for arthralgias, joint swelling and myalgias. Skin: Positive for color change. Negative for wound. Neurological: Negative for dizziness, tingling, weakness and numbness. Vitals:    11/22/17 1418   BP: 103/64   Pulse: 98   Resp: 16   Temp: 99 °F (37.2 °C)   SpO2: 97%   Weight: 73.5 kg (162 lb)   Height: 6' (1.829 m)            Physical Exam   Constitutional: He is oriented to person, place, and time. He appears ill. Cardiovascular: Regular rhythm and normal heart sounds. Tachycardia present. Pulmonary/Chest: Effort normal and breath sounds normal.   Musculoskeletal:        Right shoulder: He exhibits decreased range of motion, tenderness, swelling, effusion and pain. He exhibits normal pulse and normal strength. Right upper arm: He exhibits tenderness. Right hand: He exhibits normal capillary refill. Normal sensation noted. Normal strength noted. Neurological: He is alert and oriented to person, place, and time. Skin: Skin is warm and intact. Rash noted. There is erythema. Psychiatric: He has a normal mood and affect. His behavior is normal.   Nursing note and vitals reviewed.     Recent Results (from the past 12 hour(s))   CBC WITH AUTOMATED DIFF    Collection Time: 11/22/17  2:36 PM   Result Value Ref Range    WBC 10.2 4.3 - 11.1 K/uL    RBC 3.96 (L) 4.23 - 5.67 M/uL    HGB 11.0 (L) 13.6 - 17.2 g/dL    HCT 32.8 (L) 41.1 - 50.3 %    MCV 82.8 79.6 - 97.8 FL    MCH 27.8 26.1 - 32.9 PG    MCHC 33.5 31.4 - 35.0 g/dL    RDW 13.2 11.9 - 14.6 %    PLATELET 662 (H) 835 - 450 K/uL    MPV 10.6 (L) 10.8 - 14.1 FL    DF AUTOMATED      NEUTROPHILS 66 43 - 78 %    LYMPHOCYTES 24 13 - 44 %    MONOCYTES 9 4.0 - 12.0 %    EOSINOPHILS 1 0.5 - 7.8 %    BASOPHILS 0 0.0 - 2.0 %    IMMATURE GRANULOCYTES 0 0.0 - 5.0 %    ABS. NEUTROPHILS 6.7 1.7 - 8.2 K/UL    ABS. LYMPHOCYTES 2.4 0.5 - 4.6 K/UL    ABS. MONOCYTES 1.0 0.1 - 1.3 K/UL    ABS. EOSINOPHILS 0.1 0.0 - 0.8 K/UL    ABS. BASOPHILS 0.0 0.0 - 0.2 K/UL    ABS. IMM. GRANS. 0.0 0.0 - 0.5 K/UL   METABOLIC PANEL, COMPREHENSIVE    Collection Time: 11/22/17  2:36 PM   Result Value Ref Range    Sodium 137 136 - 145 mmol/L    Potassium 5.0 3.5 - 5.1 mmol/L    Chloride 103 98 - 107 mmol/L    CO2 24 21 - 32 mmol/L    Anion gap 10 7 - 16 mmol/L    Glucose 91 65 - 100 mg/dL    BUN 10 6 - 23 MG/DL    Creatinine 0.88 0.8 - 1.5 MG/DL    GFR est AA >60 >60 ml/min/1.73m2    GFR est non-AA >60 >60 ml/min/1.73m2    Calcium 9.2 8.3 - 10.4 MG/DL    Bilirubin, total 0.6 0.2 - 1.1 MG/DL    ALT (SGPT) 51 12 - 65 U/L    AST (SGOT) 53 (H) 15 - 37 U/L    Alk. phosphatase 74 50 - 136 U/L    Protein, total 8.9 (H) 6.3 - 8.2 g/dL    Albumin 2.9 (L) 3.5 - 5.0 g/dL    Globulin 6.0 (H) 2.3 - 3.5 g/dL    A-G Ratio 0.5 (L) 1.2 - 3.5     C REACTIVE PROTEIN, QT    Collection Time: 11/22/17  2:36 PM   Result Value Ref Range    C-Reactive protein 6.4 (H) 0.0 - 0.9 mg/dL   SED RATE, AUTOMATED    Collection Time: 11/22/17  2:36 PM   Result Value Ref Range    Sed rate, automated 98 (H) 0 - 15 mm/hr     Xr Chest Pa Lat    Result Date: 11/6/2017  PA LATERAL CHEST X-RAY HISTORY: 2 days of fever and cough COMPARISON: 2/17/2015 FINDINGS: The heart size is normal. There is no consolidation, pleural effusions, or pulmonary edema. A suspected bilateral in the left perihilar region appears unchanged. IMPRESSION: No consolidation. Xr Shoulder Rt Ap/lat Min 2 V    Result Date: 11/22/2017  RIGHT SHOULDER, 3 views. HISTORY: Right shoulder pain. COMPARISON:  None. TECHNIQUE: Internal and actually rotated AP views and axillary views. IMPRESSION: No acute fracture, subluxation or dislocation. Included portion of the ribs are unremarkable. Degenerative changes the acromioclavicular joint.         MDM  Number of Diagnoses or Management Options  Abscess:   Diagnosis management comments: Patient given prescription for bactrim and tylenol #3. He is to return to the ED after being on antibiotics for 48 hours for wound recheck. Patient noted to have elevated CRP and ESR. WBC within normal limits. Xray negative for acute fracture. Patient states pain has improved after ID. Amount and/or Complexity of Data Reviewed  Clinical lab tests: ordered and reviewed  Tests in the radiology section of CPT®: ordered and reviewed  Tests in the medicine section of CPT®: reviewed and ordered  Discuss the patient with other providers: yes (luis  )    Patient Progress  Patient progress: stable    ED Course       I&D Abcess Simple  Date/Time: 11/22/2017 5:01 PM  Performed by: Zuly Galloway  Authorized by: Zuly Galloway     Consent:     Consent obtained:  Verbal    Consent given by:  Patient    Risks discussed:  Incomplete drainage    Alternatives discussed:  No treatment  Location:     Type:  Abscess    Size:  9 cm    Location:  Upper extremity    Upper extremity location:  Shoulder    Shoulder location:  R shoulder  Pre-procedure details:     Skin preparation:  Betadine  Anesthesia (see MAR for exact dosages): Anesthesia method:  Local infiltration    Local anesthetic:  Lidocaine 1% w/o epi  Procedure type:     Complexity:  Simple  Procedure details:     Needle aspiration: yes      Needle size:  18 G    Incision types:  Single straight    Incision depth:  Subcutaneous    Scalpel blade:  11    Wound management:  Probed and deloculated    Drainage:  Purulent    Drainage amount:  Copious    Wound treatment:  Wound left open    Packing materials:  None  Post-procedure details:     Patient tolerance of procedure:   Tolerated well, no immediate complications

## 2017-11-22 NOTE — ED NOTES
Independent exam performed by myself. No suspicion for septic joint as patient has good range of motion without any inflammation, erythema, or swelling in the posterior aspect of the joint. It appears to be an isolated anterior abscess versus septic bursitis. Area visualized with ultrasound showing complex fluid resembling purulent material.  Visualized NP aspirate large amount purulent material using 18-gauge needle and then suggested proceeding to full I&D.

## 2017-11-22 NOTE — DISCHARGE INSTRUCTIONS

## 2017-11-27 LAB
BACTERIA SPEC CULT: ABNORMAL
GRAM STN SPEC: ABNORMAL
GRAM STN SPEC: ABNORMAL
SERVICE CMNT-IMP: ABNORMAL

## 2017-11-29 LAB
BACTERIA SPEC CULT: NORMAL
SERVICE CMNT-IMP: NORMAL

## 2018-01-07 ENCOUNTER — HOSPITAL ENCOUNTER (EMERGENCY)
Age: 43
Discharge: HOME | End: 2018-01-07
Attending: EMERGENCY MEDICINE
Payer: COMMERCIAL

## 2018-01-07 VITALS
OXYGEN SATURATION: 97 % | TEMPERATURE: 99.4 F | HEIGHT: 72 IN | RESPIRATION RATE: 20 BRPM | DIASTOLIC BLOOD PRESSURE: 85 MMHG | HEART RATE: 90 BPM | BODY MASS INDEX: 22.35 KG/M2 | SYSTOLIC BLOOD PRESSURE: 135 MMHG | WEIGHT: 165 LBS

## 2018-01-07 DIAGNOSIS — L02.414 ABSCESS OF ARM, LEFT: Primary | ICD-10-CM

## 2018-01-07 LAB
ALBUMIN SERPL-MCNC: 3.1 G/DL (ref 3.5–5)
ALBUMIN/GLOB SERPL: 0.6 {RATIO} (ref 1.2–3.5)
ALP SERPL-CCNC: 79 U/L (ref 50–136)
ALT SERPL-CCNC: 13 U/L (ref 12–65)
ANION GAP SERPL CALC-SCNC: 9 MMOL/L (ref 7–16)
AST SERPL-CCNC: 46 U/L (ref 15–37)
BASOPHILS # BLD: 0 K/UL (ref 0–0.2)
BASOPHILS NFR BLD: 0 % (ref 0–2)
BILIRUB SERPL-MCNC: 0.6 MG/DL (ref 0.2–1.1)
BUN SERPL-MCNC: 11 MG/DL (ref 6–23)
CALCIUM SERPL-MCNC: 8.9 MG/DL (ref 8.3–10.4)
CHLORIDE SERPL-SCNC: 103 MMOL/L (ref 98–107)
CO2 SERPL-SCNC: 26 MMOL/L (ref 21–32)
CREAT SERPL-MCNC: 1.14 MG/DL (ref 0.8–1.5)
DIFFERENTIAL METHOD BLD: ABNORMAL
EOSINOPHIL # BLD: 0 K/UL (ref 0–0.8)
EOSINOPHIL NFR BLD: 0 % (ref 0.5–7.8)
ERYTHROCYTE [DISTWIDTH] IN BLOOD BY AUTOMATED COUNT: 13.9 % (ref 11.9–14.6)
GLOBULIN SER CALC-MCNC: 5.6 G/DL (ref 2.3–3.5)
GLUCOSE SERPL-MCNC: 128 MG/DL (ref 65–100)
HCT VFR BLD AUTO: 33.9 % (ref 41.1–50.3)
HGB BLD-MCNC: 11.1 G/DL (ref 13.6–17.2)
IMM GRANULOCYTES # BLD: 0.1 K/UL (ref 0–0.5)
IMM GRANULOCYTES NFR BLD AUTO: 0 % (ref 0–5)
LYMPHOCYTES # BLD: 1.6 K/UL (ref 0.5–4.6)
LYMPHOCYTES NFR BLD: 10 % (ref 13–44)
MCH RBC QN AUTO: 27.1 PG (ref 26.1–32.9)
MCHC RBC AUTO-ENTMCNC: 32.7 G/DL (ref 31.4–35)
MCV RBC AUTO: 82.9 FL (ref 79.6–97.8)
MONOCYTES # BLD: 1.2 K/UL (ref 0.1–1.3)
MONOCYTES NFR BLD: 8 % (ref 4–12)
NEUTS SEG # BLD: 13.2 K/UL (ref 1.7–8.2)
NEUTS SEG NFR BLD: 82 % (ref 43–78)
PLATELET # BLD AUTO: 419 K/UL (ref 150–450)
PMV BLD AUTO: 10.1 FL (ref 10.8–14.1)
POTASSIUM SERPL-SCNC: 4.6 MMOL/L (ref 3.5–5.1)
PROT SERPL-MCNC: 8.7 G/DL (ref 6.3–8.2)
RBC # BLD AUTO: 4.09 M/UL (ref 4.23–5.67)
SODIUM SERPL-SCNC: 138 MMOL/L (ref 136–145)
WBC # BLD AUTO: 16.1 K/UL (ref 4.3–11.1)

## 2018-01-07 PROCEDURE — 87205 SMEAR GRAM STAIN: CPT | Performed by: EMERGENCY MEDICINE

## 2018-01-07 PROCEDURE — 96374 THER/PROPH/DIAG INJ IV PUSH: CPT | Performed by: EMERGENCY MEDICINE

## 2018-01-07 PROCEDURE — 96361 HYDRATE IV INFUSION ADD-ON: CPT | Performed by: EMERGENCY MEDICINE

## 2018-01-07 PROCEDURE — 99284 EMERGENCY DEPT VISIT MOD MDM: CPT | Performed by: EMERGENCY MEDICINE

## 2018-01-07 PROCEDURE — 96372 THER/PROPH/DIAG INJ SC/IM: CPT | Performed by: EMERGENCY MEDICINE

## 2018-01-07 PROCEDURE — 74011250637 HC RX REV CODE- 250/637: Performed by: EMERGENCY MEDICINE

## 2018-01-07 PROCEDURE — 85025 COMPLETE CBC W/AUTO DIFF WBC: CPT | Performed by: NURSE PRACTITIONER

## 2018-01-07 PROCEDURE — 74011250636 HC RX REV CODE- 250/636: Performed by: EMERGENCY MEDICINE

## 2018-01-07 PROCEDURE — 80053 COMPREHEN METABOLIC PANEL: CPT | Performed by: NURSE PRACTITIONER

## 2018-01-07 PROCEDURE — 77030019895 HC PCKNG STRP IODO -A

## 2018-01-07 PROCEDURE — 75810000293 HC SIMP/SUPERF WND  RPR: Performed by: EMERGENCY MEDICINE

## 2018-01-07 RX ORDER — HYDROMORPHONE HYDROCHLORIDE 2 MG/ML
1 INJECTION, SOLUTION INTRAMUSCULAR; INTRAVENOUS; SUBCUTANEOUS
Status: COMPLETED | OUTPATIENT
Start: 2018-01-07 | End: 2018-01-07

## 2018-01-07 RX ORDER — KETOROLAC TROMETHAMINE 30 MG/ML
30 INJECTION, SOLUTION INTRAMUSCULAR; INTRAVENOUS
Status: COMPLETED | OUTPATIENT
Start: 2018-01-07 | End: 2018-01-07

## 2018-01-07 RX ORDER — CHLORHEXIDINE GLUCONATE 4 G/100ML
SOLUTION TOPICAL
Qty: 473 ML | Refills: 0 | Status: SHIPPED | OUTPATIENT
Start: 2018-01-07 | End: 2018-12-08

## 2018-01-07 RX ORDER — MORPHINE SULFATE 15 MG/1
15 TABLET ORAL
Qty: 15 TAB | Refills: 0 | Status: SHIPPED | OUTPATIENT
Start: 2018-01-07 | End: 2018-12-08

## 2018-01-07 RX ORDER — ONDANSETRON 8 MG/1
8 TABLET, ORALLY DISINTEGRATING ORAL
Status: COMPLETED | OUTPATIENT
Start: 2018-01-07 | End: 2018-01-07

## 2018-01-07 RX ORDER — CLINDAMYCIN HYDROCHLORIDE 150 MG/1
300 CAPSULE ORAL
Status: COMPLETED | OUTPATIENT
Start: 2018-01-07 | End: 2018-01-07

## 2018-01-07 RX ORDER — CLINDAMYCIN HYDROCHLORIDE 300 MG/1
300 CAPSULE ORAL 4 TIMES DAILY
Qty: 28 CAP | Refills: 0 | Status: SHIPPED | OUTPATIENT
Start: 2018-01-07 | End: 2018-01-14

## 2018-01-07 RX ORDER — MUPIROCIN 20 MG/G
OINTMENT TOPICAL
Qty: 22 G | Refills: 0 | Status: SHIPPED | OUTPATIENT
Start: 2018-01-07 | End: 2018-12-08

## 2018-01-07 RX ORDER — SODIUM CHLORIDE 9 MG/ML
1000 INJECTION, SOLUTION INTRAVENOUS ONCE
Status: COMPLETED | OUTPATIENT
Start: 2018-01-07 | End: 2018-01-07

## 2018-01-07 RX ORDER — ONDANSETRON 8 MG/1
8 TABLET, ORALLY DISINTEGRATING ORAL
Qty: 12 TAB | Refills: 0 | Status: SHIPPED | OUTPATIENT
Start: 2018-01-07 | End: 2018-12-08

## 2018-01-07 RX ADMIN — CLINDAMYCIN HYDROCHLORIDE 300 MG: 150 CAPSULE ORAL at 13:42

## 2018-01-07 RX ADMIN — SODIUM CHLORIDE 1000 ML: 900 INJECTION, SOLUTION INTRAVENOUS at 14:08

## 2018-01-07 RX ADMIN — KETOROLAC TROMETHAMINE 30 MG: 30 INJECTION, SOLUTION INTRAMUSCULAR at 14:08

## 2018-01-07 RX ADMIN — HYDROMORPHONE HYDROCHLORIDE 1 MG: 2 INJECTION, SOLUTION INTRAMUSCULAR; INTRAVENOUS; SUBCUTANEOUS at 13:40

## 2018-01-07 RX ADMIN — ONDANSETRON 8 MG: 8 TABLET, ORALLY DISINTEGRATING ORAL at 13:41

## 2018-01-07 NOTE — LETTER
[facility] [place] [facility] [address] [id number] 
[phone] Work/School Note Date: 1/7/2018 To Whom It May concern: 
 
Cecy Retana was seen and treated today in the emergency room by the following provider(s): 
Attending Provider: Belgica Lugo MD. Cecy Retana may return to work on 01/08/2017. Sincerely, Aurea Ornelas RN

## 2018-01-07 NOTE — ED PROVIDER NOTES
Patient is a 40 y.o. male presenting with skin problem. The history is provided by the patient. Skin Problem    This is a new problem. Episode onset: 5-6 days ago. The problem has been gradually worsening. Affected Location: left upper arm. The pain is severe. The pain has been constant since onset. Associated symptoms include pain. Treatments tried: fat back. Past Medical History:   Diagnosis Date    GERD (gastroesophageal reflux disease)        Past Surgical History:   Procedure Laterality Date    HX OTHER SURGICAL      hernia          History reviewed. No pertinent family history. Social History     Social History    Marital status:      Spouse name: N/A    Number of children: N/A    Years of education: N/A     Occupational History    Not on file. Social History Main Topics    Smoking status: Current Every Day Smoker     Packs/day: 0.50    Smokeless tobacco: Never Used    Alcohol use No      Comment: seldom    Drug use: Yes     Special: Heroin, Cocaine      Comment: sniffing;  denies IV use    Sexual activity: Not on file     Other Topics Concern    Not on file     Social History Narrative         ALLERGIES: Review of patient's allergies indicates no known allergies. Review of Systems   Constitutional: Negative for chills and fever. Gastrointestinal: Negative for nausea and vomiting. Vitals:    01/07/18 1146   BP: 120/74   Pulse: (!) 103   Resp: 18   Temp: 98.3 °F (36.8 °C)   SpO2: 98%   Weight: 74.8 kg (165 lb)   Height: 6' (1.829 m)            Physical Exam   Constitutional: He appears well-developed and well-nourished. Cardiovascular: Regular rhythm and normal heart sounds. Tachycardia present. Pulmonary/Chest: Effort normal and breath sounds normal.   Abdominal: Soft. He exhibits no distension. There is no tenderness. There is no rebound and no guarding. Musculoskeletal: Normal range of motion. Left upper arm: He exhibits tenderness and edema. Arms:  No significant change in pain level with flexion and extension of left biceps and triceps. Therefore I do not believe this involves the muscle belly. I will attempt to incise and drainage in the emergency department. Neurological: He is alert. Skin: Skin is warm and dry. Nursing note and vitals reviewed. MDM  Number of Diagnoses or Management Options  Abscess of arm, left:   Diagnosis management comments: Large abscess left upper arm posteriorly. Initial incision and drainage performed in the emergency department. Patient terminated the procedure secondary to pain. Surgery consult for further evaluation. Conscious sedation versus continued drainage in the operating room will be quite required. We will attempt analgesia first and see if the patient will allow us to continue. Wound culture obtained. Oral clindamycin ordered. 3:13 PM  After pain medication patient was amenable to further incision and drainage, the loculations and packing. This has been performed and patient tolerated well. Dressing will be applied by mouth clindamycin will be prescribed as well as pain medication. Patient has been seen by surgery and will follow-up in their clinic in the next 1-2 days for wound recheck.        Amount and/or Complexity of Data Reviewed  Clinical lab tests: ordered and reviewed (Results for orders placed or performed during the hospital encounter of 01/07/18  -CULTURE, WOUND W GRAM STAIN       Result                                            Value                         Ref Range                       Special Requests:                                 LEFT                                                          GRAM STAIN                                        0 TO 10 WBCS/OIF                                              GRAM STAIN                                        FEW GRAM POSITIVE COCCI                                       Culture result: PENDING                                                  -CBC WITH AUTOMATED DIFF       Result                                            Value                         Ref Range                       WBC                                               16.1 (H)                      4.3 - 11.1 K/uL                 RBC                                               4.09 (L)                      4.23 - 5.67 M/uL                HGB                                               11.1 (L)                      13.6 - 17.2 g/dL                HCT                                               33.9 (L)                      41.1 - 50.3 %                   MCV                                               82.9                          79.6 - 97.8 FL                  MCH                                               27.1                          26.1 - 32.9 PG                  MCHC                                              32.7                          31.4 - 35.0 g/dL                RDW                                               13.9                          11.9 - 14.6 %                   PLATELET                                          419                           150 - 450 K/uL                  MPV                                               10.1 (L)                      10.8 - 14.1 FL                  DF                                                AUTOMATED                                                     NEUTROPHILS                                       82 (H)                        43 - 78 %                       LYMPHOCYTES                                       10 (L)                        13 - 44 %                       MONOCYTES                                         8                             4.0 - 12.0 %                    EOSINOPHILS                                       0 (L)                         0.5 - 7.8 %                     BASOPHILS                                         0 0.0 - 2.0 %                     IMMATURE GRANULOCYTES                             0                             0.0 - 5.0 %                     ABS. NEUTROPHILS                                  13.2 (H)                      1.7 - 8.2 K/UL                  ABS. LYMPHOCYTES                                  1.6                           0.5 - 4.6 K/UL                  ABS. MONOCYTES                                    1.2                           0.1 - 1.3 K/UL                  ABS. EOSINOPHILS                                  0.0                           0.0 - 0.8 K/UL                  ABS. BASOPHILS                                    0.0                           0.0 - 0.2 K/UL                  ABS. IMM.  GRANS.                                  0.1                           0.0 - 0.5 K/UL             -METABOLIC PANEL, COMPREHENSIVE       Result                                            Value                         Ref Range                       Sodium                                            138                           136 - 145 mmol/L                Potassium                                         4.6                           3.5 - 5.1 mmol/L                Chloride                                          103                           98 - 107 mmol/L                 CO2                                               26                            21 - 32 mmol/L                  Anion gap                                         9                             7 - 16 mmol/L                   Glucose                                           128 (H)                       65 - 100 mg/dL                  BUN                                               11                            6 - 23 MG/DL                    Creatinine                                        1.14                          0.8 - 1.5 MG/DL                 GFR est AA                                        >60 >60 ml/min/1.73m2               GFR est non-AA                                    >60                           >60 ml/min/1.73m2               Calcium                                           8.9                           8.3 - 10.4 MG/DL                Bilirubin, total                                  0.6                           0.2 - 1.1 MG/DL                 ALT (SGPT)                                        13                            12 - 65 U/L                     AST (SGOT)                                        46 (H)                        15 - 37 U/L                     Alk. phosphatase                                  79                            50 - 136 U/L                    Protein, total                                    8.7 (H)                       6.3 - 8.2 g/dL                  Albumin                                           3.1 (L)                       3.5 - 5.0 g/dL                  Globulin                                          5.6 (H)                       2.3 - 3.5 g/dL                  A-G Ratio                                         0.6 (L)                       1.2 - 3.5                  )  Discuss the patient with other providers: yes      ED Course       I&D Abcess Complex  Date/Time: 1/7/2018 1:56 PM  Performed by: Jono Jorge  Authorized by: Jono Jorge     Consent:     Consent obtained:  Verbal    Consent given by:  Patient    Risks discussed:  Bleeding and pain    Alternatives discussed:  No treatment  Location:     Type:  Abscess    Size:  Baseball to softball size  Pre-procedure details:     Skin preparation:  Betadine  Anesthesia (see MAR for exact dosages):      Anesthesia method:  Local infiltration    Local anesthetic:  Lidocaine 1% w/o epi  Procedure type:     Complexity:  Complex  Procedure details:     Needle aspiration: no      Incision types:  Single straight    Incision depth:  Subcutaneous    Scalpel blade:  11    Drainage:  Bloody and purulent Drainage amount:  Copious    Wound treatment:  Wound left open    Packing materials:  None  Post-procedure details:     Patient tolerance of procedure:  Procedure terminated at patient's request  Comments:      Large copious bloody in. Drainage was obtained. When attempts to compress and continue to drain the abscess were made the patient did not tolerate the exam and requests that I stop. I was unable to probe her deloculated in the emergency department. I was unable to pack the wound in the emergency department. Pain medication was ordered. Surgery consult at for possible  Continuation of procedure in the operating room vs conscious sedation may be needed. I&D Abcess Complex  Date/Time: 1/7/2018 3:11 PM  Performed by: Jono Jorge  Authorized by: Jono Jorge     Consent:     Consent obtained:  Verbal    Consent given by:  Patient    Risks discussed:  Bleeding and pain  Location:     Type:  Abscess    Size:  Baseball to softball-second incision    Location: left upper arm. Pre-procedure details:     Skin preparation:  Betadine  Anesthesia (see MAR for exact dosages): Anesthesia method:  Local infiltration    Local anesthetic:  Lidocaine 1% w/o epi  Procedure type:     Complexity:  Complex  Procedure details:     Incision types:  Single straight    Incision depth:  Subcutaneous    Scalpel blade:  11    Wound management:  Probed and deloculated    Drainage:  Bloody and purulent    Drainage amount:  Copious    Wound treatment:  Wound left open    Packing materials:  1/4 in gauze  Post-procedure details:     Patient tolerance of procedure:   Tolerated well, no immediate complications

## 2018-01-07 NOTE — ED NOTES
I have reviewed discharge instructions with the patient and spouse. The patient and spouse verbalized understanding. Patient left ED via Discharge Method: ambulatory to Home with spouse. Artur Gains Opportunity for questions and clarification provided. Patient given 5 scripts. To continue your aftercare when you leave the hospital, you may receive an automated call from our care team to check in on how you are doing. This is a free service and part of our promise to provide the best care and service to meet your aftercare needs.  If you have questions, or wish to unsubscribe from this service please call [phone]. Thank you for Choosing our [facility] Emergency Department.

## 2018-01-07 NOTE — DISCHARGE INSTRUCTIONS

## 2018-11-05 ENCOUNTER — HOSPITAL ENCOUNTER (EMERGENCY)
Age: 43
Discharge: HOME | End: 2018-11-05
Payer: SELF-PAY

## 2018-11-05 VITALS
DIASTOLIC BLOOD PRESSURE: 70 MMHG | SYSTOLIC BLOOD PRESSURE: 121 MMHG | WEIGHT: 165 LBS | RESPIRATION RATE: 18 BRPM | HEIGHT: 72 IN | OXYGEN SATURATION: 96 % | HEART RATE: 104 BPM | BODY MASS INDEX: 22.35 KG/M2 | TEMPERATURE: 100 F

## 2018-11-05 DIAGNOSIS — B34.9 VIRAL ILLNESS: Primary | ICD-10-CM

## 2018-11-05 LAB
FLUAV AG NPH QL IA: NEGATIVE
FLUBV AG NPH QL IA: NEGATIVE
SPECIMEN SOURCE: NORMAL

## 2018-11-05 PROCEDURE — 74011250637 HC RX REV CODE- 250/637

## 2018-11-05 PROCEDURE — 87804 INFLUENZA ASSAY W/OPTIC: CPT

## 2018-11-05 PROCEDURE — 99284 EMERGENCY DEPT VISIT MOD MDM: CPT

## 2018-11-05 RX ORDER — PREDNISONE 50 MG/1
50 TABLET ORAL DAILY
Qty: 3 TAB | Refills: 0 | Status: SHIPPED | OUTPATIENT
Start: 2018-11-05 | End: 2018-11-08

## 2018-11-05 RX ORDER — ACETAMINOPHEN 500 MG
1000 TABLET ORAL
Status: COMPLETED | OUTPATIENT
Start: 2018-11-05 | End: 2018-11-05

## 2018-11-05 RX ADMIN — ACETAMINOPHEN 1000 MG: 500 TABLET ORAL at 04:56

## 2018-11-05 NOTE — DISCHARGE INSTRUCTIONS

## 2018-11-05 NOTE — LETTER
[address] [facility] EMERGENCY DEPT [address] [address] 
[phone] Work/School Note Date: 11/5/2018 To Whom It May concern: 
 
Luis Alba was seen and treated today in the emergency room by the following provider(s): 
Attending Provider: Felicia Pool MD. Luis Alba needs to be excused from work 11/4/2018 thru 11/5/2018. Mr. Aicha Sunshine can return to full duty on 11/6/2018. Sincerely, 
 
 
 
 
Debo Goodman

## 2018-11-05 NOTE — ED PROVIDER NOTES
61-year-old male complaining of body aches fever. Onset was 2 days ago. he states he's been around some people that have had flulike symptoms. The history is provided by the patient. Fever This is a new problem. The current episode started 2 days ago. The problem occurs constantly. The problem has not changed since onset. Patient reports a subjective fever - was not measured. Associated symptoms include muscle aches. He has tried nothing for the symptoms. Past Medical History:  
Diagnosis Date  GERD (gastroesophageal reflux disease) Past Surgical History:  
Procedure Laterality Date  HX OTHER SURGICAL    
 hernia No family history on file. Social History Socioeconomic History  Marital status:  Spouse name: Not on file  Number of children: Not on file  Years of education: Not on file  Highest education level: Not on file Social Needs  Financial resource strain: Not on file  Food insecurity - worry: Not on file  Food insecurity - inability: Not on file  Transportation needs - medical: Not on file  Transportation needs - non-medical: Not on file Occupational History  Not on file Tobacco Use  Smoking status: Current Every Day Smoker Packs/day: 0.50  Smokeless tobacco: Never Used Substance and Sexual Activity  Alcohol use: No  
  Comment: seldom  Drug use: Yes Types: Heroin, Cocaine Comment: sniffing;  denies IV use  Sexual activity: Not on file Other Topics Concern  Not on file Social History Narrative  Not on file ALLERGIES: Patient has no known allergies. Review of Systems Constitutional: Positive for fever. Negative for activity change. HENT: Negative. Eyes: Negative. Respiratory: Negative. Cardiovascular: Negative. Gastrointestinal: Negative. Genitourinary: Negative. Musculoskeletal: Negative. Skin: Negative. Neurological: Negative. Psychiatric/Behavioral: Negative. All other systems reviewed and are negative. Vitals:  
 11/05/18 0330 BP: 135/80 Pulse: 100 Resp: 18 Temp: 100 °F (37.8 °C) SpO2: 95% Weight: 74.8 kg (165 lb) Height: 6' (1.829 m) Physical Exam  
Constitutional: He is oriented to person, place, and time. He appears well-developed and well-nourished. No distress. HENT:  
Head: Normocephalic and atraumatic. Right Ear: External ear normal.  
Left Ear: External ear normal.  
Nose: Nose normal.  
Eyes: Conjunctivae and EOM are normal. Pupils are equal, round, and reactive to light. Right eye exhibits no discharge. Left eye exhibits no discharge. No scleral icterus. Neck: Normal range of motion. Cardiovascular: Regular rhythm. Pulmonary/Chest: Effort normal and breath sounds normal. No stridor. No respiratory distress. He has no wheezes. He has no rales. Abdominal: Soft. Bowel sounds are normal. He exhibits no distension. There is no tenderness. Musculoskeletal: Normal range of motion. Neurological: He is alert and oriented to person, place, and time. He exhibits normal muscle tone. Coordination normal.  
Skin: Skin is warm and dry. No rash noted. Psychiatric: He has a normal mood and affect. His behavior is normal.  
  
 
MDM Number of Diagnoses or Management Options Viral illness:  
Diagnosis management comments: Patient is nontoxic no meningismus. Difficulty. We'll try outpatient treatment. Amount and/or Complexity of Data Reviewed Clinical lab tests: ordered and reviewed Tests in the medicine section of CPT®: ordered and reviewed Procedures

## 2018-11-12 ENCOUNTER — APPOINTMENT (OUTPATIENT)
Dept: GENERAL RADIOLOGY | Age: 43
End: 2018-11-12
Attending: EMERGENCY MEDICINE
Payer: SELF-PAY

## 2018-11-12 ENCOUNTER — HOSPITAL ENCOUNTER (EMERGENCY)
Age: 43
Discharge: HOME | End: 2018-11-12
Attending: EMERGENCY MEDICINE
Payer: SELF-PAY

## 2018-11-12 VITALS
WEIGHT: 165 LBS | TEMPERATURE: 98.8 F | BODY MASS INDEX: 22.35 KG/M2 | HEIGHT: 72 IN | HEART RATE: 83 BPM | SYSTOLIC BLOOD PRESSURE: 134 MMHG | OXYGEN SATURATION: 97 % | RESPIRATION RATE: 17 BRPM | DIASTOLIC BLOOD PRESSURE: 90 MMHG

## 2018-11-12 DIAGNOSIS — F11.90 HEROIN USE: Primary | ICD-10-CM

## 2018-11-12 DIAGNOSIS — R10.84 ABDOMINAL PAIN, GENERALIZED: ICD-10-CM

## 2018-11-12 LAB
AMPHET UR QL SCN: NEGATIVE
ANION GAP SERPL CALC-SCNC: 10 MMOL/L (ref 7–16)
APPEARANCE UR: ABNORMAL
BACTERIA URNS QL MICRO: 0 /HPF
BARBITURATES UR QL SCN: NEGATIVE
BASOPHILS # BLD: 0.1 K/UL (ref 0–0.2)
BASOPHILS NFR BLD: 0 % (ref 0–2)
BENZODIAZ UR QL: NEGATIVE
BILIRUB UR QL: NEGATIVE
BUN SERPL-MCNC: 11 MG/DL (ref 6–23)
CALCIUM SERPL-MCNC: 8.9 MG/DL (ref 8.3–10.4)
CANNABINOIDS UR QL SCN: POSITIVE
CASTS URNS QL MICRO: ABNORMAL /LPF
CHLORIDE SERPL-SCNC: 99 MMOL/L (ref 98–107)
CO2 SERPL-SCNC: 24 MMOL/L (ref 21–32)
COCAINE UR QL SCN: POSITIVE
COLOR UR: YELLOW
CREAT SERPL-MCNC: 0.97 MG/DL (ref 0.8–1.5)
DIFFERENTIAL METHOD BLD: ABNORMAL
EOSINOPHIL # BLD: 0 K/UL (ref 0–0.8)
EOSINOPHIL NFR BLD: 0 % (ref 0.5–7.8)
EPI CELLS #/AREA URNS HPF: ABNORMAL /HPF
ERYTHROCYTE [DISTWIDTH] IN BLOOD BY AUTOMATED COUNT: 12.9 %
ETHANOL SERPL-MCNC: <3 MG/DL
GLUCOSE SERPL-MCNC: 99 MG/DL (ref 65–100)
GLUCOSE UR STRIP.AUTO-MCNC: NEGATIVE MG/DL
HCT VFR BLD AUTO: 31.9 % (ref 41.1–50.3)
HGB BLD-MCNC: 10 G/DL (ref 13.6–17.2)
HGB UR QL STRIP: ABNORMAL
IMM GRANULOCYTES # BLD: 0.2 K/UL (ref 0–0.5)
IMM GRANULOCYTES NFR BLD AUTO: 1 % (ref 0–5)
KETONES UR QL STRIP.AUTO: NEGATIVE MG/DL
LACTATE BLD-SCNC: 0.9 MMOL/L (ref 0.5–1.9)
LEUKOCYTE ESTERASE UR QL STRIP.AUTO: NEGATIVE
LYMPHOCYTES # BLD: 1.2 K/UL (ref 0.5–4.6)
LYMPHOCYTES NFR BLD: 7 % (ref 13–44)
MCH RBC QN AUTO: 26.8 PG (ref 26.1–32.9)
MCHC RBC AUTO-ENTMCNC: 31.3 G/DL (ref 31.4–35)
MCV RBC AUTO: 85.5 FL (ref 79.6–97.8)
METHADONE UR QL: NEGATIVE
MONOCYTES # BLD: 1.1 K/UL (ref 0.1–1.3)
MONOCYTES NFR BLD: 6 % (ref 4–12)
NEUTS SEG # BLD: 15.7 K/UL (ref 1.7–8.2)
NEUTS SEG NFR BLD: 86 % (ref 43–78)
NITRITE UR QL STRIP.AUTO: NEGATIVE
NRBC # BLD: 0 K/UL (ref 0–0.2)
OPIATES UR QL: POSITIVE
PCP UR QL: NEGATIVE
PH UR STRIP: 7 [PH] (ref 5–9)
PLATELET # BLD AUTO: 492 K/UL (ref 150–450)
PMV BLD AUTO: 10.2 FL (ref 9.4–12.3)
POTASSIUM SERPL-SCNC: 3.8 MMOL/L (ref 3.5–5.1)
PROCALCITONIN SERPL-MCNC: <0.1 NG/ML
PROT UR STRIP-MCNC: ABNORMAL MG/DL
RBC # BLD AUTO: 3.73 M/UL (ref 4.23–5.6)
RBC #/AREA URNS HPF: ABNORMAL /HPF
SODIUM SERPL-SCNC: 133 MMOL/L (ref 136–145)
SP GR UR REFRACTOMETRY: 1.01 (ref 1–1.02)
UROBILINOGEN UR QL STRIP.AUTO: 1 EU/DL (ref 0.2–1)
WBC # BLD AUTO: 18.3 K/UL (ref 4.3–11.1)
WBC URNS QL MICRO: ABNORMAL /HPF

## 2018-11-12 PROCEDURE — 96375 TX/PRO/DX INJ NEW DRUG ADDON: CPT | Performed by: EMERGENCY MEDICINE

## 2018-11-12 PROCEDURE — 80048 BASIC METABOLIC PNL TOTAL CA: CPT

## 2018-11-12 PROCEDURE — 80307 DRUG TEST PRSMV CHEM ANLYZR: CPT

## 2018-11-12 PROCEDURE — 74011250636 HC RX REV CODE- 250/636: Performed by: EMERGENCY MEDICINE

## 2018-11-12 PROCEDURE — 85025 COMPLETE CBC W/AUTO DIFF WBC: CPT

## 2018-11-12 PROCEDURE — 96374 THER/PROPH/DIAG INJ IV PUSH: CPT | Performed by: EMERGENCY MEDICINE

## 2018-11-12 PROCEDURE — 84145 PROCALCITONIN (PCT): CPT

## 2018-11-12 PROCEDURE — 81001 URINALYSIS AUTO W/SCOPE: CPT

## 2018-11-12 PROCEDURE — 96361 HYDRATE IV INFUSION ADD-ON: CPT | Performed by: EMERGENCY MEDICINE

## 2018-11-12 PROCEDURE — 83605 ASSAY OF LACTIC ACID: CPT

## 2018-11-12 PROCEDURE — 99285 EMERGENCY DEPT VISIT HI MDM: CPT | Performed by: EMERGENCY MEDICINE

## 2018-11-12 PROCEDURE — 71045 X-RAY EXAM CHEST 1 VIEW: CPT

## 2018-11-12 RX ORDER — ONDANSETRON 2 MG/ML
4 INJECTION INTRAMUSCULAR; INTRAVENOUS
Status: COMPLETED | OUTPATIENT
Start: 2018-11-12 | End: 2018-11-12

## 2018-11-12 RX ORDER — LORAZEPAM 2 MG/ML
1 INJECTION INTRAMUSCULAR
Status: COMPLETED | OUTPATIENT
Start: 2018-11-12 | End: 2018-11-12

## 2018-11-12 RX ORDER — CHLORDIAZEPOXIDE HYDROCHLORIDE 5 MG/1
5 CAPSULE, GELATIN COATED ORAL
Qty: 40 CAP | Refills: 0 | Status: SHIPPED | OUTPATIENT
Start: 2018-11-12 | End: 2018-12-08

## 2018-11-12 RX ORDER — KETOROLAC TROMETHAMINE 30 MG/ML
30 INJECTION, SOLUTION INTRAMUSCULAR; INTRAVENOUS
Status: COMPLETED | OUTPATIENT
Start: 2018-11-12 | End: 2018-11-12

## 2018-11-12 RX ADMIN — ONDANSETRON 4 MG: 2 INJECTION INTRAMUSCULAR; INTRAVENOUS at 08:09

## 2018-11-12 RX ADMIN — LORAZEPAM 1 MG: 2 INJECTION, SOLUTION INTRAMUSCULAR; INTRAVENOUS at 08:09

## 2018-11-12 RX ADMIN — SODIUM CHLORIDE 1000 ML: 900 INJECTION, SOLUTION INTRAVENOUS at 08:09

## 2018-11-12 RX ADMIN — KETOROLAC TROMETHAMINE 30 MG: 30 INJECTION, SOLUTION INTRAMUSCULAR; INTRAVENOUS at 08:09

## 2018-11-12 NOTE — ED PROVIDER NOTES
Patient states he last used heroin yesterday morning and Lortab sometime yesterday. Feels he is withdrawing from these with no use today. Having all over body aches and abdominal pain with mild nausea and diarrhea. Denies any chest pain or shortness breath. No dysuria. The history is provided by the patient. No  was used. Abdominal Pain This is a new problem. The current episode started 6 to 12 hours ago. The problem occurs constantly. The problem has not changed since onset. Associated with: heroin and lortab use. The pain is located in the generalized abdominal region. The quality of the pain is aching. The pain is moderate. Associated symptoms include diarrhea, nausea and myalgias. Pertinent negatives include no fever, no melena, no vomiting, no constipation, no dysuria, no hematuria, no headaches, no chest pain and no back pain. Nothing worsens the pain. The pain is relieved by nothing. His past medical history is significant for GERD. Past Medical History:  
Diagnosis Date  GERD (gastroesophageal reflux disease) Past Surgical History:  
Procedure Laterality Date  HX OTHER SURGICAL    
 hernia History reviewed. No pertinent family history. Social History Socioeconomic History  Marital status:  Spouse name: Not on file  Number of children: Not on file  Years of education: Not on file  Highest education level: Not on file Social Needs  Financial resource strain: Not on file  Food insecurity - worry: Not on file  Food insecurity - inability: Not on file  Transportation needs - medical: Not on file  Transportation needs - non-medical: Not on file Occupational History  Not on file Tobacco Use  Smoking status: Current Every Day Smoker Packs/day: 0.50  Smokeless tobacco: Never Used Substance and Sexual Activity  Alcohol use: No  
  Comment: seldom  Drug use:  Yes  
 Types: Heroin, Cocaine Comment: sniffing;  denies IV use  Sexual activity: Not on file Other Topics Concern  Not on file Social History Narrative  Not on file ALLERGIES: Patient has no known allergies. Review of Systems Constitutional: Negative for chills and fever. HENT: Negative for rhinorrhea and sore throat. Eyes: Negative for pain and redness. Respiratory: Negative for chest tightness, shortness of breath and wheezing. Cardiovascular: Negative for chest pain and leg swelling. Gastrointestinal: Positive for abdominal pain, diarrhea and nausea. Negative for constipation, melena and vomiting. Genitourinary: Negative for dysuria and hematuria. Musculoskeletal: Positive for myalgias. Negative for back pain, gait problem, neck pain and neck stiffness. Skin: Negative for color change and rash. Neurological: Negative for weakness, numbness and headaches. Vitals:  
 11/12/18 7400 BP: 127/76 Pulse: 93 Resp: 17 Temp: 98.8 °F (37.1 °C) SpO2: 100% Weight: 74.8 kg (165 lb) Height: 6' (1.829 m) Physical Exam  
Constitutional: He is oriented to person, place, and time. He appears well-developed and well-nourished. HENT:  
Head: Normocephalic and atraumatic. Neck: Normal range of motion. Neck supple. Cardiovascular: Normal rate and regular rhythm. Pulmonary/Chest: Effort normal and breath sounds normal.  
Abdominal: Soft. Bowel sounds are normal. There is tenderness (mild diffuse). Musculoskeletal: Normal range of motion. He exhibits no edema. Neurological: He is alert and oriented to person, place, and time. Skin: Skin is warm and dry. MDM Number of Diagnoses or Management Options Diagnosis management comments: Will discharge patient with Librium for possible withdrawal type symptoms although patient stable here. Amount and/or Complexity of Data Reviewed Clinical lab tests: ordered and reviewed Tests in the medicine section of CPT®: ordered and reviewed Patient Progress Patient progress: stable Procedures Results Include: 
 
Recent Results (from the past 24 hour(s)) CBC WITH AUTOMATED DIFF Collection Time: 11/12/18  8:42 AM  
Result Value Ref Range WBC 18.3 (H) 4.3 - 11.1 K/uL  
 RBC 3.73 (L) 4.23 - 5.6 M/uL  
 HGB 10.0 (L) 13.6 - 17.2 g/dL HCT 31.9 (L) 41.1 - 50.3 % MCV 85.5 79.6 - 97.8 FL  
 MCH 26.8 26.1 - 32.9 PG  
 MCHC 31.3 (L) 31.4 - 35.0 g/dL  
 RDW 12.9 % PLATELET 695 (H) 028 - 450 K/uL MPV 10.2 9.4 - 12.3 FL ABSOLUTE NRBC 0.00 0.0 - 0.2 K/uL  
 DF AUTOMATED NEUTROPHILS 86 (H) 43 - 78 % LYMPHOCYTES 7 (L) 13 - 44 % MONOCYTES 6 4.0 - 12.0 % EOSINOPHILS 0 (L) 0.5 - 7.8 % BASOPHILS 0 0.0 - 2.0 % IMMATURE GRANULOCYTES 1 0.0 - 5.0 %  
 ABS. NEUTROPHILS 15.7 (H) 1.7 - 8.2 K/UL  
 ABS. LYMPHOCYTES 1.2 0.5 - 4.6 K/UL  
 ABS. MONOCYTES 1.1 0.1 - 1.3 K/UL  
 ABS. EOSINOPHILS 0.0 0.0 - 0.8 K/UL  
 ABS. BASOPHILS 0.1 0.0 - 0.2 K/UL  
 ABS. IMM. GRANS. 0.2 0.0 - 0.5 K/UL METABOLIC PANEL, BASIC Collection Time: 11/12/18  8:42 AM  
Result Value Ref Range Sodium 133 (L) 136 - 145 mmol/L Potassium 3.8 3.5 - 5.1 mmol/L Chloride 99 98 - 107 mmol/L  
 CO2 24 21 - 32 mmol/L Anion gap 10 7 - 16 mmol/L Glucose 99 65 - 100 mg/dL BUN 11 6 - 23 MG/DL Creatinine 0.97 0.8 - 1.5 MG/DL  
 GFR est AA >60 >60 ml/min/1.73m2 GFR est non-AA >60 >60 ml/min/1.73m2 Calcium 8.9 8.3 - 10.4 MG/DL  
ETHYL ALCOHOL Collection Time: 11/12/18  8:42 AM  
Result Value Ref Range ALCOHOL(ETHYL),SERUM <3 MG/DL PROCALCITONIN Collection Time: 11/12/18  8:42 AM  
Result Value Ref Range Procalcitonin <0.1 ng/mL POC LACTIC ACID Collection Time: 11/12/18 11:57 AM  
Result Value Ref Range Lactic Acid (POC) 0.90 0.5 - 1.9 mmol/L  
URINALYSIS W/ RFLX MICROSCOPIC Collection Time: 11/12/18 12:57 PM  
Result Value Ref Range Color YELLOW Appearance TURBID Specific gravity 1.015 1.001 - 1.023    
 pH (UA) 7.0 5.0 - 9.0 Protein TRACE (A) NEG mg/dL Glucose NEGATIVE  mg/dL Ketone NEGATIVE  NEG mg/dL Bilirubin NEGATIVE  NEG Blood SMALL (A) NEG Urobilinogen 1.0 0.2 - 1.0 EU/dL Nitrites NEGATIVE  NEG Leukocyte Esterase NEGATIVE  NEG    
 WBC 0-3 0 /hpf  
 RBC 5-10 0 /hpf Epithelial cells 0-3 0 /hpf Bacteria 0 0 /hpf Casts 0-3 0 /lpf  
 
 
 
 
XR CHEST PORT (Final result) Result time 11/12/18 09:44:15 Final result by Lea Coleman MD (11/12/18 09:44:15) Impression:  
 IMPRESSION: 
 
There is unchanged mild enlargement of the cardiac silhouette. Low lung volumes with bibasilar atelectasis. No evidence of a focal pneumonia. VOICE DICTATED BY: Dr. Gene Raya Narrative: EXAMINATION: CHEST RADIOGRAPH 11/12/2018 9:41 AM 
 
ACCESSION NUMBER: [id number] INDICATION: body aches COMPARISON: Chest x-ray 11/6/2017 TECHNIQUE: A single AP view of the chest was obtained. FINDINGS:  
 
Support Lines and Tubes: None Cardiac Silhouette: There is unchanged mild enlargement of the cardiac 
silhouette. Lungs: Low lung volumes with bibasilar atelectasis. No evidence of a focal 
pneumonia. Pleura: No pleural effusion. No pneumothorax. Osseous Structures: Thoracic spine spondylosis. Upper Abdomen: Unremarkable.

## 2018-11-12 NOTE — ED NOTES
I have reviewed discharge instructions with the patient. The patient verbalized understanding. Patient left ED via Discharge Method: ambulatory to Home with family. Opportunity for questions and clarification provided. Patient given 1 scripts. To continue your aftercare when you leave the hospital, you may receive an automated call from our care team to check in on how you are doing. This is a free service and part of our promise to provide the best care and service to meet your aftercare needs.  If you have questions, or wish to unsubscribe from this service please call [phone]. Thank you for Choosing our [facility] Emergency Department.

## 2018-11-12 NOTE — DISCHARGE INSTRUCTIONS
Abdominal Pain: Care Instructions  Your Care Instructions    Abdominal pain has many possible causes. Some aren't serious and get better on their own in a few days. Others need more testing and treatment. If your pain continues or gets worse, you need to be rechecked and may need more tests to find out what is wrong. You may need surgery to correct the problem. Don't ignore new symptoms, such as fever, nausea and vomiting, urination problems, pain that gets worse, and dizziness. These may be signs of a more serious problem. Your doctor may have recommended a follow-up visit in the next 8 to 12 hours. If you are not getting better, you may need more tests or treatment. The doctor has checked you carefully, but problems can develop later. If you notice any problems or new symptoms, get medical treatment right away. Follow-up care is a key part of your treatment and safety. Be sure to make and go to all appointments, and call your doctor if you are having problems. It's also a good idea to know your test results and keep a list of the medicines you take. How can you care for yourself at home? · Rest until you feel better. · To prevent dehydration, drink plenty of fluids, enough so that your urine is light yellow or clear like water. Choose water and other caffeine-free clear liquids until you feel better. If you have kidney, heart, or liver disease and have to limit fluids, talk with your doctor before you increase the amount of fluids you drink. · If your stomach is upset, eat mild foods, such as rice, dry toast or crackers, bananas, and applesauce. Try eating several small meals instead of two or three large ones. · Wait until 48 hours after all symptoms have gone away before you have spicy foods, alcohol, and drinks that contain caffeine. · Do not eat foods that are high in fat. · Avoid anti-inflammatory medicines such as aspirin, ibuprofen (Advil, Motrin), and naproxen (Aleve).  These can cause stomach upset. Talk to your doctor if you take daily aspirin for another health problem. When should you call for help? Call 911 anytime you think you may need emergency care. For example, call if:    · You passed out (lost consciousness).     · You pass maroon or very bloody stools.     · You vomit blood or what looks like coffee grounds.     · You have new, severe belly pain.    Call your doctor now or seek immediate medical care if:    · Your pain gets worse, especially if it becomes focused in one area of your belly.     · You have a new or higher fever.     · Your stools are black and look like tar, or they have streaks of blood.     · You have unexpected vaginal bleeding.     · You have symptoms of a urinary tract infection. These may include:  ? Pain when you urinate. ? Urinating more often than usual.  ? Blood in your urine.     · You are dizzy or lightheaded, or you feel like you may faint.    Watch closely for changes in your health, and be sure to contact your doctor if:    · You are not getting better after 1 day (24 hours). Where can you learn more? Go to http://lakshmi-deysi.info/. Enter X305 in the search box to learn more about \"Abdominal Pain: Care Instructions. \"  Current as of: November 20, 2017  Content Version: 11.8  © 2502-9991 [employer]. Care instructions adapted under license by [employer] (which disclaims liability or warranty for this information). If you have questions about a medical condition or this instruction, always ask your healthcare professional. [place] [address] any warranty or liability for your use of this information. Opioid Overdose: Care Instructions  Your Care Instructions    You have had treatment to help your body recover from taking too much of an opioid. You are getting better, but you may not feel well for a while. It takes time for the opioids to leave your body.  How long it takes to feel better depends on which drug you took and how much you took of it. Opioids include illegal drugs such as heroin, often called smack, junk, H, and ska. Opioids also include medicines that doctors prescribe to treat pain. These are medicines such as oxycodone, methadone, and buprenorphine. They are sometimes sold and used illegally. Taking too much of an opioid can be dangerous. It may cause:  · Trouble breathing. · Low blood pressure. · A low heart rate. · A coma. When the doctor treated you for the overdose, he or she may have:  · Watched your symptoms or done tests to find out what kind of drug you took. · Given you fluids. · Given you oxygen to help you breathe. · Given you a medicine called naloxone to help reverse the effects of the opioid. · Done several tests, including blood tests, to see how you're responding to treatment. The doctor also watched you carefully to make sure you were recovering safely. Follow-up care is a key part of your treatment and safety. Be sure to make and go to all appointments, and call your doctor if you are having problems. It's also a good idea to know your test results and keep a list of the medicines you take. How can you care for yourself at home? · If you take opioids regularly, your body gets used to them. This is called dependency. If you are dependent on this drug, you may have withdrawal symptoms when you stop taking it. These can include nausea, sweating, chills, diarrhea, stomach cramps, and muscle aches. Withdrawal can last up to several weeks, depending on which drug you took and how long you took it. You may feel very ill, but you are probably not in medical danger. · Your doctor may give you medicine to help you feel better. To help get through withdrawal, you can also:  ? Get plenty of rest.  ? Drink plenty of fluids. ? Stay active, but don't tire yourself. ? Eat a healthy diet.   · If you had a tube in your throat to help you breathe, you may have a sore throat or hoarseness that can last a few days. Sip liquids to help soothe your throat. · Do not drink alcohol or take illegal drugs. · Do not take medications that make you tired, like sleeping pills or muscle relaxers. · Do not drive if you feel sleepy or groggy while you recover from your overdose. · Get help to stop using drugs. Talk to your doctor about drug treatment programs. · Talk to your doctor or pharmacist about having a naloxone rescue kit on hand. When should you call for help? Call 911 anytime you think you may need emergency care. For example, call if:    · You feel you cannot stop from hurting yourself or someone else.   [facility] your doctor now or seek immediate medical care if:    · You have new or worse withdrawal symptoms, such as:  ? Stomach cramps. ? Vomiting. ? Diarrhea. ? Muscle aches. ? Sweating.    Watch closely for changes in your health, and be sure to contact your doctor if:    · You do not get better as expected. Where can you learn more? Go to http://lakshmi-deysi.info/. Enter 529 61 [id number] in the search box to learn more about \"Opioid Overdose: Care Instructions. \"  Current as of: May 8, 2018  Content Version: 11.8  © 9245-7151 [employer]. Care instructions adapted under license by [employer] (which disclaims liability or warranty for this information). If you have questions about a medical condition or this instruction, always ask your healthcare professional. [place] [address] any warranty or liability for your use of this information.

## 2018-11-18 ENCOUNTER — APPOINTMENT (OUTPATIENT)
Dept: GENERAL RADIOLOGY | Age: 43
End: 2018-11-18
Attending: EMERGENCY MEDICINE
Payer: SELF-PAY

## 2018-11-18 ENCOUNTER — HOSPITAL ENCOUNTER (EMERGENCY)
Age: 43
Discharge: HOME | End: 2018-11-19
Attending: EMERGENCY MEDICINE
Payer: SELF-PAY

## 2018-11-18 DIAGNOSIS — R50.9 ACUTE FEBRILE ILLNESS: ICD-10-CM

## 2018-11-18 DIAGNOSIS — M25.551 RIGHT HIP PAIN: Primary | ICD-10-CM

## 2018-11-18 DIAGNOSIS — L02.91 ABSCESS: ICD-10-CM

## 2018-11-18 LAB
ANION GAP SERPL CALC-SCNC: 7 MMOL/L (ref 7–16)
BASOPHILS # BLD: 0 K/UL (ref 0–0.2)
BASOPHILS NFR BLD: 0 % (ref 0–2)
BUN SERPL-MCNC: 13 MG/DL (ref 6–23)
CALCIUM SERPL-MCNC: 8.8 MG/DL (ref 8.3–10.4)
CHLORIDE SERPL-SCNC: 99 MMOL/L (ref 98–107)
CO2 SERPL-SCNC: 25 MMOL/L (ref 21–32)
CREAT SERPL-MCNC: 1.04 MG/DL (ref 0.8–1.5)
CRP SERPL-MCNC: 25 MG/DL (ref 0–0.9)
DIFFERENTIAL METHOD BLD: ABNORMAL
EOSINOPHIL # BLD: 0 K/UL (ref 0–0.8)
EOSINOPHIL NFR BLD: 0 % (ref 0.5–7.8)
ERYTHROCYTE [DISTWIDTH] IN BLOOD BY AUTOMATED COUNT: 13.3 %
ERYTHROCYTE [SEDIMENTATION RATE] IN BLOOD: 117 MM/HR (ref 0–15)
FLUAV AG NPH QL IA: NEGATIVE
FLUBV AG NPH QL IA: NEGATIVE
GLUCOSE SERPL-MCNC: 99 MG/DL (ref 65–100)
HCT VFR BLD AUTO: 24.7 % (ref 41.1–50.3)
HGB BLD-MCNC: 7.9 G/DL (ref 13.6–17.2)
IMM GRANULOCYTES # BLD: 0.9 K/UL (ref 0–0.5)
IMM GRANULOCYTES NFR BLD AUTO: 3 % (ref 0–5)
LACTATE BLD-SCNC: 0.37 MMOL/L (ref 0.5–1.9)
LYMPHOCYTES # BLD: 2 K/UL (ref 0.5–4.6)
LYMPHOCYTES NFR BLD: 7 % (ref 13–44)
MCH RBC QN AUTO: 26.4 PG (ref 26.1–32.9)
MCHC RBC AUTO-ENTMCNC: 32 G/DL (ref 31.4–35)
MCV RBC AUTO: 82.6 FL (ref 79.6–97.8)
MONOCYTES # BLD: 1.7 K/UL (ref 0.1–1.3)
MONOCYTES NFR BLD: 6 % (ref 4–12)
NEUTS SEG # BLD: 24 K/UL (ref 1.7–8.2)
NEUTS SEG NFR BLD: 84 % (ref 43–78)
NRBC # BLD: 0 K/UL (ref 0–0.2)
PLATELET # BLD AUTO: 477 K/UL (ref 150–450)
PMV BLD AUTO: 10.4 FL (ref 9.4–12.3)
POTASSIUM SERPL-SCNC: 4 MMOL/L (ref 3.5–5.1)
RBC # BLD AUTO: 2.99 M/UL (ref 4.23–5.6)
SODIUM SERPL-SCNC: 131 MMOL/L (ref 136–145)
SPECIMEN SOURCE: NORMAL
WBC # BLD AUTO: 28.6 K/UL (ref 4.3–11.1)

## 2018-11-18 PROCEDURE — 86140 C-REACTIVE PROTEIN: CPT

## 2018-11-18 PROCEDURE — 74011250637 HC RX REV CODE- 250/637: Performed by: EMERGENCY MEDICINE

## 2018-11-18 PROCEDURE — 81003 URINALYSIS AUTO W/O SCOPE: CPT | Performed by: EMERGENCY MEDICINE

## 2018-11-18 PROCEDURE — 87804 INFLUENZA ASSAY W/OPTIC: CPT

## 2018-11-18 PROCEDURE — 85025 COMPLETE CBC W/AUTO DIFF WBC: CPT

## 2018-11-18 PROCEDURE — 99284 EMERGENCY DEPT VISIT MOD MDM: CPT | Performed by: EMERGENCY MEDICINE

## 2018-11-18 PROCEDURE — 83605 ASSAY OF LACTIC ACID: CPT

## 2018-11-18 PROCEDURE — 73502 X-RAY EXAM HIP UNI 2-3 VIEWS: CPT

## 2018-11-18 PROCEDURE — 87040 BLOOD CULTURE FOR BACTERIA: CPT

## 2018-11-18 PROCEDURE — 85652 RBC SED RATE AUTOMATED: CPT

## 2018-11-18 PROCEDURE — 71046 X-RAY EXAM CHEST 2 VIEWS: CPT

## 2018-11-18 PROCEDURE — 74011250636 HC RX REV CODE- 250/636: Performed by: EMERGENCY MEDICINE

## 2018-11-18 PROCEDURE — 80048 BASIC METABOLIC PNL TOTAL CA: CPT

## 2018-11-18 RX ORDER — KETOROLAC TROMETHAMINE 30 MG/ML
30 INJECTION, SOLUTION INTRAMUSCULAR; INTRAVENOUS
Status: COMPLETED | OUTPATIENT
Start: 2018-11-18 | End: 2018-11-18

## 2018-11-18 RX ORDER — HYDROCODONE BITARTRATE AND ACETAMINOPHEN 10; 325 MG/1; MG/1
1 TABLET ORAL
Status: COMPLETED | OUTPATIENT
Start: 2018-11-18 | End: 2018-11-18

## 2018-11-18 RX ADMIN — HYDROCODONE BITARTRATE AND ACETAMINOPHEN 1 TABLET: 10; 325 TABLET ORAL at 22:48

## 2018-11-18 RX ADMIN — KETOROLAC TROMETHAMINE 30 MG: 30 INJECTION, SOLUTION INTRAMUSCULAR at 22:48

## 2018-11-19 VITALS
OXYGEN SATURATION: 98 % | BODY MASS INDEX: 21.94 KG/M2 | WEIGHT: 162 LBS | SYSTOLIC BLOOD PRESSURE: 142 MMHG | TEMPERATURE: 100.7 F | HEART RATE: 101 BPM | HEIGHT: 72 IN | RESPIRATION RATE: 18 BRPM | DIASTOLIC BLOOD PRESSURE: 78 MMHG

## 2018-11-19 PROCEDURE — 75810000289 HC I&D ABSCESS SIMP/COMP/MULT: Performed by: EMERGENCY MEDICINE

## 2018-11-19 PROCEDURE — 74011000258 HC RX REV CODE- 258: Performed by: EMERGENCY MEDICINE

## 2018-11-19 PROCEDURE — 96365 THER/PROPH/DIAG IV INF INIT: CPT | Performed by: EMERGENCY MEDICINE

## 2018-11-19 PROCEDURE — 74011250637 HC RX REV CODE- 250/637: Performed by: EMERGENCY MEDICINE

## 2018-11-19 PROCEDURE — 77030019895 HC PCKNG STRP IODO -A

## 2018-11-19 PROCEDURE — 74011000250 HC RX REV CODE- 250: Performed by: EMERGENCY MEDICINE

## 2018-11-19 PROCEDURE — 96375 TX/PRO/DX INJ NEW DRUG ADDON: CPT | Performed by: EMERGENCY MEDICINE

## 2018-11-19 RX ORDER — CLINDAMYCIN HYDROCHLORIDE 300 MG/1
300 CAPSULE ORAL 4 TIMES DAILY
Qty: 28 CAP | Refills: 0 | Status: SHIPPED | OUTPATIENT
Start: 2018-11-19 | End: 2018-12-08

## 2018-11-19 RX ORDER — IBUPROFEN 800 MG/1
800 TABLET ORAL
Qty: 20 TAB | Refills: 0 | Status: SHIPPED | OUTPATIENT
Start: 2018-11-19 | End: 2018-12-08

## 2018-11-19 RX ORDER — IBUPROFEN 800 MG/1
800 TABLET ORAL
Status: COMPLETED | OUTPATIENT
Start: 2018-11-19 | End: 2018-11-19

## 2018-11-19 RX ADMIN — IBUPROFEN 800 MG: 800 TABLET ORAL at 01:59

## 2018-11-19 RX ADMIN — SODIUM CHLORIDE 900 MG: 9 INJECTION, SOLUTION INTRAVENOUS at 01:25

## 2018-11-19 NOTE — ED NOTES
I have reviewed discharge instructions with the patient. The patient verbalized understanding. Patient left ED via Discharge Method: ambulatory to Home with brother who is picking him up. Opportunity for questions and clarification provided. Patient given 2 scripts. To continue your aftercare when you leave the hospital, you may receive an automated call from our care team to check in on how you are doing. This is a free service and part of our promise to provide the best care and service to meet your aftercare needs.  If you have questions, or wish to unsubscribe from this service please call [phone]. Thank you for Choosing our [facility] Emergency Department.

## 2018-11-19 NOTE — ED PROVIDER NOTES
Patient complains of right hip pain as he points to his greater trochanter area. Onset about one week ago. Patient does state that he was walking in the yard and fell, but believes he landed mostly on his gluteal area and a seated position. The pain started after this fall and has gradually worsened over the intervening week. Pain is worse with ambulation. Patient has not tried any medicinal remedies to alleviate the pain. 1:00 AM 
Has a very late rising finding on history. Has unexplained the patient my concerns regarding his fever and high white count and again going over review of systems, looking for other sources of infection, he then mentions that he's had a boil on his left humeral area for 2 days. He does not recall any scratch scrape poke bite or other insult to the area. No prior abscesses anywhere Past Medical History:  
Diagnosis Date  GERD (gastroesophageal reflux disease) Past Surgical History:  
Procedure Laterality Date  HX OTHER SURGICAL    
 hernia No family history on file. Social History Socioeconomic History  Marital status:  Spouse name: Not on file  Number of children: Not on file  Years of education: Not on file  Highest education level: Not on file Social Needs  Financial resource strain: Not on file  Food insecurity - worry: Not on file  Food insecurity - inability: Not on file  Transportation needs - medical: Not on file  Transportation needs - non-medical: Not on file Occupational History  Not on file Tobacco Use  Smoking status: Current Every Day Smoker Packs/day: 0.50  Smokeless tobacco: Never Used Substance and Sexual Activity  Alcohol use: No  
  Comment: seldom  Drug use: Yes Types: Heroin, Cocaine Comment: sniffing;  denies IV use  Sexual activity: Not on file Other Topics Concern  Not on file Social History Narrative  Not on file ALLERGIES: Patient has no known allergies. Review of Systems Constitutional: Positive for fever. Negative for chills. HENT: Negative for rhinorrhea and sore throat. Eyes: Negative for discharge and redness. Respiratory: Negative for cough and shortness of breath. Cardiovascular: Negative for chest pain. Gastrointestinal: Negative for abdominal pain, nausea and vomiting. Musculoskeletal: Positive for arthralgias and gait problem. Negative for back pain. Skin: Positive for rash. Neurological: Negative for dizziness, weakness, numbness and headaches. All other systems reviewed and are negative. Vitals:  
 11/18/18 2101 11/18/18 2238 BP: 129/86 139/78 Pulse: (!) 120 (!) 107 Resp: 18 Temp: (!) 100.9 °F (38.3 °C) SpO2: 98% 96% Weight: 73.5 kg (162 lb) Height: 6' (1.829 m) Physical Exam  
Constitutional: He is oriented to person, place, and time. He appears well-developed and well-nourished. No distress. Fever noted, HENT:  
Head: Normocephalic and atraumatic. Eyes: Conjunctivae are normal. Pupils are equal, round, and reactive to light. Right eye exhibits no discharge. Left eye exhibits no discharge. No scleral icterus. Neck: Normal range of motion. Neck supple. Cardiovascular: Regular rhythm and normal heart sounds. Tachycardia present. Exam reveals no gallop. No murmur heard. Pulmonary/Chest: Effort normal and breath sounds normal. No respiratory distress. He has no wheezes. He has no rales. Abdominal: Soft. Bowel sounds are normal. There is no tenderness. There is no guarding. Musculoskeletal: He exhibits tenderness. He exhibits no edema. Left elbow: He exhibits swelling. Right hip: He exhibits decreased range of motion, tenderness and bony tenderness. Right knee: Normal.  
     Right ankle: Normal.  
     Arms: 
     Legs: 
Neurological: He is alert and oriented to person, place, and time.  He exhibits normal muscle tone. cni 2-12 grossly Skin: Skin is warm and dry. He is not diaphoretic. Psychiatric: He has a normal mood and affect. His behavior is normal.  
Nursing note and vitals reviewed. MDM Number of Diagnoses or Management Options Acute febrile illness:  
Right hip pain:  
Diagnosis management comments: Medical decision making note: 
Hip pain status post fall on patient with a history of polysubstance abuse. Concerningly, the patient has a fever, marked leukocytosis, and tachycardia on arrival. 
Lactic acid is normal, patient not septic, Differential diagnosis includes septic arthritis, viral illness, flu, pneumonia, pyelonephritis 1:02 AM 
hiP feeling better after Toradol and Norco, vital signs better. The patient diaphoretic however. Large abscess found mid lateral left humeral area That is now status- post successful I&D Start clindamycin Follow up Tuesday morning, patient has a fairly large piece of half-inch iodinated Nu Gauze packed into the wound This concludes the \"medical decision making note\" part of this emergency department visit note. I&D Abcess Complex Date/Time: 11/19/2018 1:04 AM 
Performed by: Deep Skinner MD 
Authorized by: Deep Skinner MD  
 
Consent:  
  Consent obtained:  Verbal 
  Consent given by:  Patient Risks discussed:  Incomplete drainage Alternatives discussed:  No treatment Location:  
  Type:  Abscess Location:  Upper extremity Upper extremity location:  Arm Arm location:  L upper arm Pre-procedure details:  
  Skin preparation:  Betadine Anesthesia (see MAR for exact dosages): Anesthesia method:  Topical application and local infiltration Topical anesthesia: Cold spray. Local anesthetic:  Lidocaine 1% w/o epi Procedure type:  
  Complexity:  Complex Procedure details:  
  Incision types:  Single straight Incision depth:  Subcutaneous   Scalpel blade:  11 
 Wound management:  Probed and deloculated, irrigated with saline and extensive cleaning Drainage:  Purulent Drainage amount:  Copious Wound treatment:  Wound left open Packing materials:  1/2 in iodoform gauze Post-procedure details:  
  Patient tolerance of procedure: Tolerated well, no immediate complications

## 2018-11-19 NOTE — DISCHARGE INSTRUCTIONS
Return Tuesday morning for wound check and packing removal/replacement    Skin Abscess: Care Instructions  Your Care Instructions    A skin abscess is a bacterial infection that forms a pocket of pus. A boil is a kind of skin abscess. The doctor may have cut an opening in the abscess so that the pus can drain out. You may have gauze in the cut so that the abscess will stay open and keep draining. You may need antibiotics. You will need to follow up with your doctor to make sure the infection has gone away. The doctor has checked you carefully, but problems can develop later. If you notice any problems or new symptoms, get medical treatment right away. Follow-up care is a key part of your treatment and safety. Be sure to make and go to all appointments, and call your doctor if you are having problems. It's also a good idea to know your test results and keep a list of the medicines you take. How can you care for yourself at home? · Apply warm and dry compresses, a heating pad set on low, or a hot water bottle 3 or 4 times a day for pain. Keep a cloth between the heat source and your skin. · If your doctor prescribed antibiotics, take them as directed. Do not stop taking them just because you feel better. You need to take the full course of antibiotics. · Take pain medicines exactly as directed. ? If the doctor gave you a prescription medicine for pain, take it as prescribed. ? If you are not taking a prescription pain medicine, ask your doctor if you can take an over-the-counter medicine. · Keep your bandage clean and dry. Change the bandage whenever it gets wet or dirty, or at least one time a day. · If the abscess was packed with gauze:  ? Keep follow-up appointments to have the gauze changed or removed. If the doctor instructed you to remove the gauze, follow the instructions you were given for how to remove it. ?  After the gauze is removed, soak the area in warm water for 15 to 20 minutes 2 times a day, until the wound closes. When should you call for help? Call your doctor now or seek immediate medical care if:    · You have signs of worsening infection, such as:  ? Increased pain, swelling, warmth, or redness. ? Red streaks leading from the infected skin. ? Pus draining from the wound. ? A fever.    Watch closely for changes in your health, and be sure to contact your doctor if:    · You do not get better as expected. Where can you learn more? Go to http://lakshmi-deysi.info/. Enter Z028 in the search box to learn more about \"Skin Abscess: Care Instructions. \"  Current as of: April 18, 2018  Content Version: 11.8  © 9284-4674 [employer]. Care instructions adapted under license by [employer] (which disclaims liability or warranty for this information). If you have questions about a medical condition or this instruction, always ask your healthcare professional. [place] [address] any warranty or liability for your use of this information. Hip Pain: Care Instructions  Your Care Instructions    Hip pain may be caused by many things, including overuse, a fall, or a twisting movement. Another cause of hip pain is arthritis. Your pain may increase when you stand up, walk, or squat. The pain may come and go or may be constant. Home treatment can help relieve hip pain, swelling, and stiffness. If your pain is ongoing, you may need more tests and treatment. Follow-up care is a key part of your treatment and safety. Be sure to make and go to all appointments, and call your doctor if you are having problems. It's also a good idea to know your test results and keep a list of the medicines you take. How can you care for yourself at home? · Take pain medicines exactly as directed. ? If the doctor gave you a prescription medicine for pain, take it as prescribed.   ? If you are not taking a prescription pain medicine, ask your doctor if you can take an over-the-counter medicine. · Rest and protect your hip. Take a break from any activity, including standing or walking, that may cause pain. · Put ice or a cold pack against your hip for 10 to 20 minutes at a time. Try to do this every 1 to 2 hours for the next 3 days (when you are awake) or until the swelling goes down. Put a thin cloth between the ice and your skin. · Sleep on your healthy side with a pillow between your knees, or sleep on your back with pillows under your knees. · If there is no swelling, you can put moist heat, a heating pad, or a warm cloth on your hip. Do gentle stretching exercises to help keep your hip flexible. · Learn how to prevent falls. Have your vision and hearing checked regularly. Wear slippers or shoes with a nonskid sole. · Stay at a healthy weight. · Wear comfortable shoes. When should you call for help? Call 911 anytime you think you may need emergency care. For example, call if:    · You have sudden chest pain and shortness of breath, or you cough up blood.     · You are not able to stand or walk or bear weight.     · Your buttocks, legs, or feet feel numb or tingly.     · Your leg or foot is cool or pale or changes color.     · You have severe pain.    Call your doctor now or seek immediate medical care if:    · You have signs of infection, such as:  ? Increased pain, swelling, warmth, or redness in the hip area. ? Red streaks leading from the hip area. ? Pus draining from the hip area. ? A fever.     · You have signs of a blood clot, such as:  ? Pain in your calf, back of the knee, thigh, or groin. ? Redness and swelling in your leg or groin.     · You are not able to bend, straighten, or move your leg normally.     · You have trouble urinating or having bowel movements.    Watch closely for changes in your health, and be sure to contact your doctor if:    · You do not get better as expected. Where can you learn more?   Go to http://lakshmi-deysi.info/. Enter J188 in the search box to learn more about \"Hip Pain: Care Instructions. \"  Current as of: November 20, 2017  Content Version: 11.8  © 7523-0207 [employer]. Care instructions adapted under license by [employer] (which disclaims liability or warranty for this information). If you have questions about a medical condition or this instruction, always ask your healthcare professional. [place] [address] any warranty or liability for your use of this information.

## 2018-11-23 LAB
BACTERIA SPEC CULT: NORMAL
SERVICE CMNT-IMP: NORMAL

## 2018-11-24 ENCOUNTER — APPOINTMENT (OUTPATIENT)
Dept: CT IMAGING | Age: 43
DRG: 464 | End: 2018-11-24
Attending: EMERGENCY MEDICINE
Payer: SELF-PAY

## 2018-11-24 ENCOUNTER — HOSPITAL ENCOUNTER (INPATIENT)
Age: 43
LOS: 14 days | Discharge: HOME HEALTH | DRG: 464 | End: 2018-12-08
Attending: EMERGENCY MEDICINE | Admitting: SURGERY
Payer: SELF-PAY

## 2018-11-24 ENCOUNTER — ANESTHESIA EVENT (OUTPATIENT)
Dept: SURGERY | Age: 43
DRG: 464 | End: 2018-11-24
Payer: SELF-PAY

## 2018-11-24 DIAGNOSIS — M60.051 INFECTIVE MYOSITIS OF RIGHT THIGH: ICD-10-CM

## 2018-11-24 DIAGNOSIS — L02.419 THIGH ABSCESS: Primary | ICD-10-CM

## 2018-11-24 DIAGNOSIS — M72.6 NECROTIZING FASCIITIS (HCC): ICD-10-CM

## 2018-11-24 LAB
ALBUMIN SERPL-MCNC: 2.3 G/DL (ref 3.5–5)
ALBUMIN/GLOB SERPL: 0.3 {RATIO} (ref 1.2–3.5)
ALP SERPL-CCNC: 64 U/L (ref 50–136)
ALT SERPL-CCNC: 17 U/L (ref 12–65)
ANION GAP SERPL CALC-SCNC: 7 MMOL/L (ref 7–16)
AST SERPL-CCNC: 19 U/L (ref 15–37)
BACTERIA SPEC CULT: NORMAL
BASOPHILS # BLD: 0 K/UL (ref 0–0.2)
BASOPHILS NFR BLD: 0 % (ref 0–2)
BILIRUB SERPL-MCNC: 0.4 MG/DL (ref 0.2–1.1)
BUN SERPL-MCNC: 17 MG/DL (ref 6–23)
CALCIUM SERPL-MCNC: 8.7 MG/DL (ref 8.3–10.4)
CHLORIDE SERPL-SCNC: 101 MMOL/L (ref 98–107)
CK SERPL-CCNC: 177 U/L (ref 21–215)
CO2 SERPL-SCNC: 26 MMOL/L (ref 21–32)
CREAT SERPL-MCNC: 1.09 MG/DL (ref 0.8–1.5)
CRP SERPL-MCNC: 9.6 MG/DL (ref 0–0.9)
DIFFERENTIAL METHOD BLD: ABNORMAL
EOSINOPHIL # BLD: 0.1 K/UL (ref 0–0.8)
EOSINOPHIL NFR BLD: 0 % (ref 0.5–7.8)
ERYTHROCYTE [DISTWIDTH] IN BLOOD BY AUTOMATED COUNT: 14.1 %
GLOBULIN SER CALC-MCNC: 6.8 G/DL (ref 2.3–3.5)
GLUCOSE SERPL-MCNC: 102 MG/DL (ref 65–100)
HCT VFR BLD AUTO: 26.8 % (ref 41.1–50.3)
HGB BLD-MCNC: 8.3 G/DL (ref 13.6–17.2)
IMM GRANULOCYTES # BLD: 0.2 K/UL (ref 0–0.5)
IMM GRANULOCYTES NFR BLD AUTO: 1 % (ref 0–5)
LACTATE BLD-SCNC: 0.62 MMOL/L (ref 0.5–1.9)
LYMPHOCYTES # BLD: 2.3 K/UL (ref 0.5–4.6)
LYMPHOCYTES NFR BLD: 11 % (ref 13–44)
MCH RBC QN AUTO: 26.3 PG (ref 26.1–32.9)
MCHC RBC AUTO-ENTMCNC: 31 G/DL (ref 31.4–35)
MCV RBC AUTO: 85.1 FL (ref 79.6–97.8)
MONOCYTES # BLD: 0.7 K/UL (ref 0.1–1.3)
MONOCYTES NFR BLD: 4 % (ref 4–12)
NEUTS SEG # BLD: 17.4 K/UL (ref 1.7–8.2)
NEUTS SEG NFR BLD: 84 % (ref 43–78)
NRBC # BLD: 0 K/UL (ref 0–0.2)
PLATELET # BLD AUTO: 740 K/UL (ref 150–450)
PMV BLD AUTO: 9.9 FL (ref 9.4–12.3)
POTASSIUM SERPL-SCNC: 4 MMOL/L (ref 3.5–5.1)
PROCALCITONIN SERPL-MCNC: 0.1 NG/ML
PROT SERPL-MCNC: 9.1 G/DL (ref 6.3–8.2)
RBC # BLD AUTO: 3.15 M/UL (ref 4.23–5.6)
SERVICE CMNT-IMP: NORMAL
SODIUM SERPL-SCNC: 134 MMOL/L (ref 136–145)
WBC # BLD AUTO: 20.8 K/UL (ref 4.3–11.1)

## 2018-11-24 PROCEDURE — 73701 CT LOWER EXTREMITY W/DYE: CPT

## 2018-11-24 PROCEDURE — 96367 TX/PROPH/DG ADDL SEQ IV INF: CPT | Performed by: EMERGENCY MEDICINE

## 2018-11-24 PROCEDURE — 74011250636 HC RX REV CODE- 250/636: Performed by: EMERGENCY MEDICINE

## 2018-11-24 PROCEDURE — 05HM33Z INSERTION OF INFUSION DEVICE INTO RIGHT INTERNAL JUGULAR VEIN, PERCUTANEOUS APPROACH: ICD-10-PCS | Performed by: EMERGENCY MEDICINE

## 2018-11-24 PROCEDURE — 74011250636 HC RX REV CODE- 250/636: Performed by: NURSE PRACTITIONER

## 2018-11-24 PROCEDURE — 82550 ASSAY OF CK (CPK): CPT

## 2018-11-24 PROCEDURE — 86140 C-REACTIVE PROTEIN: CPT

## 2018-11-24 PROCEDURE — 85025 COMPLETE CBC W/AUTO DIFF WBC: CPT

## 2018-11-24 PROCEDURE — C1751 CATH, INF, PER/CENT/MIDLINE: HCPCS

## 2018-11-24 PROCEDURE — 87040 BLOOD CULTURE FOR BACTERIA: CPT

## 2018-11-24 PROCEDURE — 99283 EMERGENCY DEPT VISIT LOW MDM: CPT | Performed by: EMERGENCY MEDICINE

## 2018-11-24 PROCEDURE — 80053 COMPREHEN METABOLIC PANEL: CPT

## 2018-11-24 PROCEDURE — 75810000137 HC PLCMT CENT VENOUS CATH: Performed by: EMERGENCY MEDICINE

## 2018-11-24 PROCEDURE — 65270000029 HC RM PRIVATE

## 2018-11-24 PROCEDURE — 96365 THER/PROPH/DIAG IV INF INIT: CPT | Performed by: EMERGENCY MEDICINE

## 2018-11-24 PROCEDURE — 74011000258 HC RX REV CODE- 258: Performed by: EMERGENCY MEDICINE

## 2018-11-24 PROCEDURE — 83605 ASSAY OF LACTIC ACID: CPT

## 2018-11-24 PROCEDURE — 74011636320 HC RX REV CODE- 636/320: Performed by: EMERGENCY MEDICINE

## 2018-11-24 PROCEDURE — 84145 PROCALCITONIN (PCT): CPT

## 2018-11-24 PROCEDURE — 96375 TX/PRO/DX INJ NEW DRUG ADDON: CPT | Performed by: EMERGENCY MEDICINE

## 2018-11-24 PROCEDURE — 74011000250 HC RX REV CODE- 250: Performed by: EMERGENCY MEDICINE

## 2018-11-24 PROCEDURE — 77030020263 HC SOL INJ SOD CL0.9% LFCR 1000ML

## 2018-11-24 RX ORDER — SODIUM CHLORIDE, SODIUM LACTATE, POTASSIUM CHLORIDE, CALCIUM CHLORIDE 600; 310; 30; 20 MG/100ML; MG/100ML; MG/100ML; MG/100ML
1000 INJECTION, SOLUTION INTRAVENOUS CONTINUOUS
Status: DISCONTINUED | OUTPATIENT
Start: 2018-11-24 | End: 2018-11-26

## 2018-11-24 RX ORDER — ONDANSETRON 2 MG/ML
4 INJECTION INTRAMUSCULAR; INTRAVENOUS
Status: COMPLETED | OUTPATIENT
Start: 2018-11-24 | End: 2018-11-24

## 2018-11-24 RX ORDER — CLINDAMYCIN PHOSPHATE 600 MG/50ML
600 INJECTION INTRAVENOUS EVERY 8 HOURS
Status: DISCONTINUED | OUTPATIENT
Start: 2018-11-24 | End: 2018-11-24 | Stop reason: SDUPTHER

## 2018-11-24 RX ORDER — HYDROMORPHONE HYDROCHLORIDE 1 MG/ML
1 INJECTION, SOLUTION INTRAMUSCULAR; INTRAVENOUS; SUBCUTANEOUS
Status: DISCONTINUED | OUTPATIENT
Start: 2018-11-24 | End: 2018-11-26

## 2018-11-24 RX ORDER — SODIUM CHLORIDE 0.9 % (FLUSH) 0.9 %
5-10 SYRINGE (ML) INJECTION AS NEEDED
Status: DISCONTINUED | OUTPATIENT
Start: 2018-11-24 | End: 2018-11-24 | Stop reason: SDUPTHER

## 2018-11-24 RX ORDER — SODIUM CHLORIDE 0.9 % (FLUSH) 0.9 %
10 SYRINGE (ML) INJECTION
Status: COMPLETED | OUTPATIENT
Start: 2018-11-24 | End: 2018-11-24

## 2018-11-24 RX ORDER — SODIUM CHLORIDE 0.9 % (FLUSH) 0.9 %
5-10 SYRINGE (ML) INJECTION EVERY 8 HOURS
Status: DISCONTINUED | OUTPATIENT
Start: 2018-11-24 | End: 2018-12-08 | Stop reason: HOSPADM

## 2018-11-24 RX ORDER — SODIUM CHLORIDE 0.9 % (FLUSH) 0.9 %
5-10 SYRINGE (ML) INJECTION EVERY 8 HOURS
Status: DISCONTINUED | OUTPATIENT
Start: 2018-11-24 | End: 2018-11-24 | Stop reason: SDUPTHER

## 2018-11-24 RX ORDER — SODIUM CHLORIDE 0.9 % (FLUSH) 0.9 %
5-10 SYRINGE (ML) INJECTION AS NEEDED
Status: DISCONTINUED | OUTPATIENT
Start: 2018-11-24 | End: 2018-12-08 | Stop reason: HOSPADM

## 2018-11-24 RX ORDER — ONDANSETRON 2 MG/ML
4 INJECTION INTRAMUSCULAR; INTRAVENOUS
Status: DISCONTINUED | OUTPATIENT
Start: 2018-11-24 | End: 2018-12-08 | Stop reason: HOSPADM

## 2018-11-24 RX ORDER — CLINDAMYCIN PHOSPHATE 600 MG/50ML
600 INJECTION INTRAVENOUS EVERY 8 HOURS
Status: DISCONTINUED | OUTPATIENT
Start: 2018-11-25 | End: 2018-11-24

## 2018-11-24 RX ORDER — OXYCODONE AND ACETAMINOPHEN 7.5; 325 MG/1; MG/1
1 TABLET ORAL
Status: DISCONTINUED | OUTPATIENT
Start: 2018-11-24 | End: 2018-12-01

## 2018-11-24 RX ORDER — SODIUM CHLORIDE 9 MG/ML
100 INJECTION, SOLUTION INTRAVENOUS CONTINUOUS
Status: DISCONTINUED | OUTPATIENT
Start: 2018-11-24 | End: 2018-11-28

## 2018-11-24 RX ORDER — MORPHINE SULFATE 10 MG/ML
6 INJECTION, SOLUTION INTRAMUSCULAR; INTRAVENOUS
Status: COMPLETED | OUTPATIENT
Start: 2018-11-24 | End: 2018-11-24

## 2018-11-24 RX ORDER — NALOXONE HYDROCHLORIDE 0.4 MG/ML
0.4 INJECTION, SOLUTION INTRAMUSCULAR; INTRAVENOUS; SUBCUTANEOUS AS NEEDED
Status: DISCONTINUED | OUTPATIENT
Start: 2018-11-24 | End: 2018-12-08 | Stop reason: HOSPADM

## 2018-11-24 RX ORDER — SODIUM CHLORIDE 0.9 % (FLUSH) 0.9 %
10 SYRINGE (ML) INJECTION
Status: ACTIVE | OUTPATIENT
Start: 2018-11-24 | End: 2018-11-25

## 2018-11-24 RX ADMIN — PIPERACILLIN SODIUM,TAZOBACTAM SODIUM 4.5 G: 4; .5 INJECTION, POWDER, FOR SOLUTION INTRAVENOUS at 15:48

## 2018-11-24 RX ADMIN — ONDANSETRON 4 MG: 2 INJECTION INTRAMUSCULAR; INTRAVENOUS at 15:47

## 2018-11-24 RX ADMIN — SODIUM CHLORIDE 900 MG: 9 INJECTION, SOLUTION INTRAVENOUS at 18:11

## 2018-11-24 RX ADMIN — VANCOMYCIN HYDROCHLORIDE 1000 MG: 1 INJECTION, POWDER, LYOPHILIZED, FOR SOLUTION INTRAVENOUS at 14:31

## 2018-11-24 RX ADMIN — HYDROMORPHONE HYDROCHLORIDE 1 MG: 1 INJECTION, SOLUTION INTRAMUSCULAR; INTRAVENOUS; SUBCUTANEOUS at 18:09

## 2018-11-24 RX ADMIN — Medication 10 ML: at 14:04

## 2018-11-24 RX ADMIN — SODIUM CHLORIDE 100 ML: 900 INJECTION, SOLUTION INTRAVENOUS at 14:04

## 2018-11-24 RX ADMIN — SODIUM CHLORIDE, SODIUM LACTATE, POTASSIUM CHLORIDE, AND CALCIUM CHLORIDE 1000 ML: 600; 310; 30; 20 INJECTION, SOLUTION INTRAVENOUS at 14:30

## 2018-11-24 RX ADMIN — HYDROMORPHONE HYDROCHLORIDE 1 MG: 1 INJECTION, SOLUTION INTRAMUSCULAR; INTRAVENOUS; SUBCUTANEOUS at 21:13

## 2018-11-24 RX ADMIN — SODIUM CHLORIDE, SODIUM LACTATE, POTASSIUM CHLORIDE, AND CALCIUM CHLORIDE 1000 ML: 600; 310; 30; 20 INJECTION, SOLUTION INTRAVENOUS at 17:00

## 2018-11-24 RX ADMIN — MORPHINE SULFATE 6 MG: 10 INJECTION INTRAVENOUS at 15:47

## 2018-11-24 RX ADMIN — VANCOMYCIN HYDROCHLORIDE 1000 MG: 1 INJECTION, POWDER, LYOPHILIZED, FOR SOLUTION INTRAVENOUS at 21:13

## 2018-11-24 RX ADMIN — IOPAMIDOL 100 ML: 755 INJECTION, SOLUTION INTRAVENOUS at 14:04

## 2018-11-24 RX ADMIN — SODIUM CHLORIDE 100 ML/HR: 900 INJECTION, SOLUTION INTRAVENOUS at 18:09

## 2018-11-24 NOTE — ED PROVIDER NOTES
42-year-old male with a history of GERD and heroin abuse presents with pain in his right hip region for the past few weeks. He admits to falling 4 days prior to the development of pain. He reports subjective fevers. He states now it is very difficult to walk due to pain. Denies similar symptoms in the past.  No radiation of pain. Patient is quite a poor historian. Reviewed chart and noted that he had been seen in the emergency department November 5, 12th, and 18th. November 5th, he was seen for fever, Nov 12 for abdominal pain and heroin use, November 18 for the right hip pain. At that visit he was found to have significant leukocytosis and after further search was found to have a large left arm abscess that was drained. He was prescribed clindamycin. Patient states he took all of this antibiotic, but interestingly, it was only prescribed 6 days ago. Family admits that he \"overtook it. \"  He also never returned for packing removal or dressing change. He states he has been too focused on the pain in his right hip. No prior hip surgeries. He states last heroin use was 4 days ago and snorts it. He denies any IV drug use. The history is provided by the patient. Hip Injury Pertinent negatives include no back pain. Past Medical History:  
Diagnosis Date  GERD (gastroesophageal reflux disease) Past Surgical History:  
Procedure Laterality Date  HX OTHER SURGICAL    
 hernia History reviewed. No pertinent family history. Social History Socioeconomic History  Marital status:  Spouse name: Not on file  Number of children: Not on file  Years of education: Not on file  Highest education level: Not on file Social Needs  Financial resource strain: Not on file  Food insecurity - worry: Not on file  Food insecurity - inability: Not on file  Transportation needs - medical: Not on file  Transportation needs - non-medical: Not on file Occupational History  Not on file Tobacco Use  Smoking status: Current Every Day Smoker Packs/day: 0.50  Smokeless tobacco: Never Used Substance and Sexual Activity  Alcohol use: No  
  Comment: seldom  Drug use: Yes Types: Heroin, Cocaine Comment: sniffing;  denies IV use  Sexual activity: Not on file Other Topics Concern  Not on file Social History Narrative  Not on file ALLERGIES: Patient has no known allergies. Review of Systems Constitutional: Positive for fatigue and fever. Negative for chills. HENT: Negative for hearing loss. Eyes: Negative for visual disturbance. Respiratory: Negative for cough and shortness of breath. Cardiovascular: Negative for chest pain and palpitations. Gastrointestinal: Negative for abdominal pain, diarrhea, nausea and vomiting. Musculoskeletal: Positive for arthralgias and myalgias. Negative for back pain and joint swelling. Skin: Positive for wound. Negative for rash. Neurological: Negative for weakness and headaches. Psychiatric/Behavioral: Negative for confusion. Vitals:  
 11/24/18 1056 BP: 125/78 Pulse: 100 Resp: 16 Temp: 99.2 °F (37.3 °C) SpO2: 97% Weight: 68 kg (150 lb) Height: 6' (1.829 m) Physical Exam  
Constitutional: He appears well-developed and well-nourished. HENT:  
Head: Normocephalic and atraumatic. Right Ear: External ear normal.  
Left Ear: External ear normal.  
Nose: Nose normal.  
Mouth/Throat: Oropharynx is clear and moist.  
Eyes: Conjunctivae are normal. Pupils are equal, round, and reactive to light. Neck: Normal range of motion. Neck supple. Cardiovascular: Regular rhythm, normal heart sounds and intact distal pulses. Pulmonary/Chest: Effort normal and breath sounds normal. No respiratory distress. He has no wheezes. Abdominal: Soft. Bowel sounds are normal. He exhibits no distension. There is no tenderness. Musculoskeletal: He exhibits no edema. Right hip: He exhibits decreased range of motion, tenderness and swelling. He exhibits no crepitus. Left upper arm: He exhibits tenderness and swelling. Arms: 
     Right upper leg: He exhibits tenderness and swelling. Legs: 
Neurological: He is alert. Skin: Skin is warm and dry. Psychiatric: Judgment normal.  
Nursing note and vitals reviewed. MDM Number of Diagnoses or Management Options Diagnosis management comments: Parts of this document were created using dragon voice recognition software. The chart has been reviewed but errors may still be present. 1:40 PM 
RN unable to obtain access. Peripheral 20-gauge IV placed into a right IJ under ultrasound guidance with sterile precautions. 3:13 PM 
 surgery mid-level, Peña Berg, covering for Dr Brayan Bishop, for admission. Given abx coverage for necrotizing fascitis. No air seen on CT. Pt updated. No pain out of proportion. Amount and/or Complexity of Data Reviewed Clinical lab tests: ordered and reviewed (Results for orders placed or performed during the hospital encounter of 11/24/18 
-CBC WITH AUTOMATED DIFF Result                      Value             Ref Range WBC                         20.8 (H)          4.3 - 11.1 K* 
     RBC                         3.15 (L)          4.23 - 5.6 M* HGB                         8.3 (L)           13.6 - 17.2 * HCT                         26.8 (L)          41.1 - 50.3 % MCV                         85.1              79.6 - 97.8 * MCH                         26.3              26.1 - 32.9 * MCHC                        31.0 (L)          31.4 - 35.0 * RDW                         14.1              % PLATELET                    740 (H)           150 - 450 K/* MPV                         9.9               9.4 - 12.3 FL ABSOLUTE NRBC               0.00              0.0 - 0.2 K/* DF                          AUTOMATED NEUTROPHILS                 84 (H)            43 - 78 % LYMPHOCYTES                 11 (L)            13 - 44 % MONOCYTES                   4                 4.0 - 12.0 % EOSINOPHILS                 0 (L)             0.5 - 7.8 % BASOPHILS                   0                 0.0 - 2.0 % IMMATURE GRANULOCYTES       1                 0.0 - 5.0 %   
     ABS. NEUTROPHILS            17.4 (H)          1.7 - 8.2 K/* ABS. LYMPHOCYTES            2.3               0.5 - 4.6 K/* ABS. MONOCYTES              0.7               0.1 - 1.3 K/* ABS. EOSINOPHILS            0.1               0.0 - 0.8 K/* ABS. BASOPHILS              0.0               0.0 - 0.2 K/* ABS. IMM. GRANS.            0.2               0.0 - 0.5 K/* 
-METABOLIC PANEL, COMPREHENSIVE Result                      Value             Ref Range Sodium                      134 (L)           136 - 145 mm* Potassium                   4.0               3.5 - 5.1 mm* Chloride                    101               98 - 107 mmo* CO2                         26                21 - 32 mmol* Anion gap                   7                 7 - 16 mmol/L Glucose                     102 (H)           65 - 100 mg/* BUN                         17                6 - 23 MG/DL Creatinine                  1.09              0.8 - 1.5 MG* 
     GFR est AA                  >60               >60 ml/min/1* GFR est non-AA              >60               >60 ml/min/1* Calcium                     8.7               8.3 - 10.4 M* Bilirubin, total            0.4               0.2 - 1.1 MG* ALT (SGPT)                  17                12 - 65 U/L   
     AST (SGOT)                  19                15 - 37 U/L Alk.  phosphatase            64                50 - 136 U/L  
 Protein, total              9.1 (H)           6.3 - 8.2 g/* Albumin                     2.3 (L)           3.5 - 5.0 g/* Globulin                    6.8 (H)           2.3 - 3.5 g/* A-G Ratio                   0.3 (L)           1.2 - 3.5     
-CK Result                      Value             Ref Range CK                          177               21 - 215 U/L  
-C REACTIVE PROTEIN, QT Result                      Value             Ref Range C-Reactive protein          9.6 (H)           0.0 - 0.9 mg* 
-PROCALCITONIN Result                      Value             Ref Range Procalcitonin               0.1               ng/mL         
-POC LACTIC ACID Result                      Value             Ref Range Lactic Acid (POC)           0.62              0.5 - 1.9 mm* 
) Tests in the radiology section of CPT®: ordered and reviewed (Ct Hip Rt W Cont Result Date: 11/24/2018 CT right hip with contrast 11/24/2018 CLINICAL HISTORY: Right hip pain and swelling to right hip and buttocks that is warm and painful to touch for one week. TECHNIQUE: Multiple contiguous helical CT images were obtained to the right hip with images reconstructed in the axial plane at 2.5 mm intervals following the uneventful intravenous administration of 100 mL Isovue-370. 2-D sagittal, and coronal reconstructed images were made and reviewed. All CT scans performed at this facility use one or all of the following: Automated exposure control, adjustment of the mA and/or kVp according to patient's size, iterative reconstruction. Findings:  Abnormal enhancing fluid collections are seen within the right lateral hip, and right proximal thigh soft tissues is appearance is concerning for multiple abscess collections within the soft tissues.  These begin in the lateral hip soft tissues just inferior to the level of the right iliac crest. The subsequently extended into the proximal right thigh soft tissues with some involvement felt to be seen in all compartments of the proximal right thigh although most prominently involving the posterior compartment. The largest distinct collection appears to bridge the anterior and posterior compartments of the proximal right thigh seen on axial image 68 measuring 11.4 cm x 4.5 cm in greatest transverse dimensions. Note is made that some persistent abscess is seen on the most inferior image seen in the lateral compartment. Therefore, the distal extent of these changes is uncertain. Early osteomyelitis can be missed by CT imaging. However, no clear bony destructive changes are seen to strongly suggest osteomyelitis. No abnormal soft tissue gas is seen. Likely reactive prominent right groin lymph nodes are seen. IMPRESSION:  1. Abscess collections within the right lateral hip and proximal thigh soft tissues as described above. ) Tests in the medicine section of CPT®: reviewed and ordered Central Line 
Date/Time: 11/24/2018 1:39 PM 
Performed by: Pranay Floyd MD 
Authorized by: Pranay Floyd MD  
 
Consent:  
  Consent obtained:  Verbal 
  Consent given by:  Patient Risks discussed:  Incorrect placement Alternatives discussed:  Alternative treatment Pre-procedure details:  
  Hand hygiene: Hand hygiene performed prior to insertion Sterile barrier technique: All elements of maximal sterile technique followed Skin preparation:  ChloraPrep Skin preparation agent: Skin preparation agent completely dried prior to procedure Procedure details: Location:  R internal jugular Patient position:  Reverse Trendelenburg Procedural supplies: 20G peripheral IV. Catheter size: 20G. Landmarks identified: yes Ultrasound guidance: yes Sterile ultrasound techniques: Sterile gel and sterile probe covers were used Number of attempts:  1 Successful placement: yes Post-procedure details: Post-procedure:  Dressing applied Assessment:  Free fluid flow Patient tolerance of procedure: Tolerated well, no immediate complications

## 2018-11-24 NOTE — PROGRESS NOTES
TRANSFER - IN REPORT: 
 
Verbal report received from Suri Pepper  being received from  ER for routine progression of care Report consisted of patients Situation, Background, Assessment and  
Recommendations(SBAR). Information from the following report(s) SBAR, ED Summary, OR Summary, Procedure Summary and Recent Results was reviewed with the receiving nurse. Opportunity for questions and clarification was provided. Assessment completed upon patients arrival to unit and care assumed.

## 2018-11-24 NOTE — ROUTINE PROCESS
TRANSFER - OUT REPORT: 
 
Verbal report given to ricardo calderon(name) on Shira Diaz  being transferred to [facility](unit) for routine progression of care Report consisted of patients Situation, Background, Assessment and  
Recommendations(SBAR). Information from the following report(s) SBAR was reviewed with the receiving nurse. Lines:  
Peripheral IV 11/24/18 Right External jugular (Active) Site Assessment Clean, dry, & intact 11/24/2018  4:56 PM  
Phlebitis Assessment 0 11/24/2018  4:56 PM  
Infiltration Assessment 0 11/24/2018  4:56 PM  
Dressing Status Clean, dry, & intact 11/24/2018  4:56 PM  
Dressing Type Transparent 11/24/2018  4:56 PM  
  
 
Opportunity for questions and clarification was provided. Patient transported with: 
 [employer]

## 2018-11-24 NOTE — H&P
H&P/Consult Note/Progress Note/Office Note:  
Ellen Diez  MRN: [id number]  :1975  Age:43 y.o. General Surgery Consult ordered by: Dr. Roz Montes Reason for General Surgery Consult: Abscess Right thigh As previously noted HPI: Ellen Diez is a 44 y.o. male with a history of GERD and heroin abuse presents with pain in his right hip region for the past few weeks. He admits to falling 4 days prior to the development of pain. He reports subjective fevers. He states now it is very difficult to walk due to pain. Denies similar symptoms in the past.  No radiation of pain. Patient is quite a poor historian. Reviewed chart and noted that he had been seen in the emergency department , , and . , he was seen for fever,  for abdominal pain and heroin use,  for the right hip pain. At that visit he was found to have significant leukocytosis and after further search was found to have a large left arm abscess that was drained. He was prescribed clindamycin. Patient states he took all of this antibiotic, but interestingly, it was only prescribed 6 days ago. Family admits that he \"overtook it. \"  He also never returned for packing removal or dressing change. He states he has been too focused on the pain in his right hip. No prior hip surgeries. He states last heroin use was 4 days ago and snorts it. He denies any IV drug use. Past Medical History:  
Diagnosis Date  GERD (gastroesophageal reflux disease) Past Surgical History:  
Procedure Laterality Date  HX OTHER SURGICAL    
 hernia Current Facility-Administered Medications Medication Dose Route Frequency  sodium chloride (NS) flush 5-10 mL  5-10 mL IntraVENous Q8H  
 sodium chloride (NS) flush 5-10 mL  5-10 mL IntraVENous PRN  
 vancomycin (VANCOCIN) 1,000 mg in 0.9% sodium chloride (MBP/ADV) 250 mL  1,000 mg IntraVENous NOW  
  lactated Ringers infusion 1,000 mL  1,000 mL IntraVENous CONTINUOUS  
 saline peripheral flush soln 10 mL  10 mL InterCATHeter RAD ONCE  piperacillin-tazobactam (ZOSYN) 4.5 g in 0.9% sodium chloride (MBP/ADV) 100 mL  4.5 g IntraVENous ONCE  
 clindamycin phosphate (CLEOCIN) 900 mg in 0.9% sodium chloride 100 mL IVPB  900 mg IntraVENous NOW  lactated Ringers infusion 1,000 mL  1,000 mL IntraVENous CONTINUOUS Current Outpatient Medications Medication Sig  
 clindamycin (CLEOCIN) 300 mg capsule Take 1 Cap by mouth four (4) times daily for 7 days. Pharmacist may switch to 150mg clindamycin, and double the number dispensed to 56, and change sig. To 2 p.o qid, If it is less expensive  ibuprofen (MOTRIN) 800 mg tablet Take 1 Tab by mouth every eight (8) hours as needed for Pain for up to 7 days.  chlordiazePOXIDE (LIBRIUM) 5 mg capsule Take 1 Cap by mouth three (3) times daily as needed for Anxiety (Take 3 pills every 8 hourse the first 2 days. Take 2 pills every 8 hourse the second 2 days. Take one pill every 8 hours the third 2 days. Then as needed after that. ). Max Daily Amount: 15 mg.  
 morphine IR (MS IR) 15 mg tablet Take 1 Tab by mouth every six (6) hours as needed for Pain. Max Daily Amount: 60 mg.  
 ondansetron (ZOFRAN ODT) 8 mg disintegrating tablet Take 1 Tab by mouth every eight (8) hours as needed for Nausea.  mupirocin (BACTROBAN) 2 % ointment applied to the inside of both nostrils twice daily for 7 days  chlorhexidine (HIBICLENS) 4 % liquid Wash once daily, avoiding genitals and eyes and allow to stay on for 30 seconds then wash off.  1 week duration.  acetaminophen-codeine (TYLENOL-CODEINE #3) 300-30 mg per tablet Take 1 Tab by mouth every four (4) hours as needed for Pain for up to 20 doses. Max Daily Amount: 6 Tabs. Patient has no known allergies. Social History Socioeconomic History  Marital status:  Spouse name: Not on file  Number of children: Not on file  Years of education: Not on file  Highest education level: Not on file Tobacco Use  Smoking status: Current Every Day Smoker Packs/day: 0.50  Smokeless tobacco: Never Used Substance and Sexual Activity  Alcohol use: No  
  Comment: seldom  Drug use: Yes Types: Heroin, Cocaine Comment: sniffing;  denies IV use Social History Tobacco Use Smoking Status Current Every Day Smoker  Packs/day: 0.50 Smokeless Tobacco Never Used History reviewed. No pertinent family history. ROS: The patient has no difficulty with chest pain or shortness of breath. Comprehensive review of systems was otherwise unremarkable except as noted above. Physical Exam:  
Visit Vitals /78 (BP 1 Location: Right arm, BP Patient Position: At rest) Pulse 100 Temp 99.2 °F (37.3 °C) Resp 16 Ht 6' (1.829 m) Wt 150 lb (68 kg) SpO2 97% BMI 20.34 kg/m² Vitals:  
 11/24/18 1056 BP: 125/78 Pulse: 100 Resp: 16 Temp: 99.2 °F (37.3 °C) SpO2: 97% Weight: 150 lb (68 kg) Height: 6' (1.829 m) No intake/output data recorded. No intake/output data recorded. Constitutional: Alert, cooperative, no acute distress; appears stated age Eyes:Sclera are clear. ENMT: no external lesions gross hearing normal; no obvious neck masses, no ear or lip lesions, nares normal 
CV: RRR. Normal perfusion Resp: No JVD. Breathing is  non-labored; no audible wheezing. GI: soft, non-tender, non-distended Musculoskeletal: Decreased ROM Right hip Integument: Right upper thigh +swelling, extremely ttp +warmth, +induration. Left upper arm approx 3cm x 5cm open area. Wound bed yellow, no active drainage. Neuro:  Oriented; moves all 4; no focal deficits Psychiatric: normal affect and mood Recent vitals (if inpt): 
Patient Vitals for the past 24 hrs: 
 BP Temp Pulse Resp SpO2 Height Weight 11/24/18 1056 125/78 99.2 °F (37.3 °C) 100 16 97 % 6' (1.829 m) 150 lb (68 kg) Labs: 
Recent Labs  
  11/24/18 
1059 WBC 20.8* HGB 8.3*  
* * K 4.0  
 CO2 26 BUN 17 CREA 1.09  
* TBILI 0.4 SGOT 19 ALT 17 AP 64 Lab Results Component Value Date/Time WBC 20.8 (H) 11/24/2018 10:59 AM  
 HGB 8.3 (L) 11/24/2018 10:59 AM  
 PLATELET 531 (H) 30/72/3795 10:59 AM  
 Sodium 134 (L) 11/24/2018 10:59 AM  
 Potassium 4.0 11/24/2018 10:59 AM  
 Chloride 101 11/24/2018 10:59 AM  
 CO2 26 11/24/2018 10:59 AM  
 BUN 17 11/24/2018 10:59 AM  
 Creatinine 1.09 11/24/2018 10:59 AM  
 Glucose 102 (H) 11/24/2018 10:59 AM  
 Bilirubin, total 0.4 11/24/2018 10:59 AM  
 AST (SGOT) 19 11/24/2018 10:59 AM  
 ALT (SGPT) 17 11/24/2018 10:59 AM  
 Alk. phosphatase 64 11/24/2018 10:59 AM  
 
11/24/18: CT right hip with contrast 
  
CLINICAL HISTORY: Right hip pain and swelling to right hip and buttocks that is 
warm and painful to touch for one week. 
  
TECHNIQUE: Multiple contiguous helical CT images were obtained to the right hip 
with images reconstructed in the axial plane at 2.5 mm intervals following the 
uneventful intravenous administration of 100 mL Isovue-370. 2-D sagittal, and 
coronal reconstructed images were made and reviewed. All CT scans performed at 
this facility use one or all of the following: Automated exposure control, 
adjustment of the mA and/or kVp according to patient's size, iterative 
reconstruction.  
  
Findings:   
Abnormal enhancing fluid collections are seen within the right lateral hip, and 
right proximal thigh soft tissues is appearance is concerning for multiple 
abscess collections within the soft tissues. These begin in the lateral hip soft 
tissues just inferior to the level of the right iliac crest. The subsequently 
extended into the proximal right thigh soft tissues with some involvement felt to be seen in all compartments of the proximal right thigh although most 
prominently involving the posterior compartment. The largest distinct collection 
appears to bridge the anterior and posterior compartments of the proximal right 
thigh seen on axial image 68 measuring 11.4 cm x 4.5 cm in greatest transverse 
dimensions. Note is made that some persistent abscess is seen on the most 
inferior image seen in the lateral compartment. Therefore, the distal extent of 
these changes is uncertain. Early osteomyelitis can be missed by CT imaging. However, no clear bony destructive changes are seen to strongly suggest 
osteomyelitis. No abnormal soft tissue gas is seen. Likely reactive prominent 
right groin lymph nodes are seen. 
  
 
IMPRESSION:   
1. Abscess collections within the right lateral hip and proximal thigh soft 
tissues as described above.  
  
 
I reviewed recent labs and recent radiologic studies. I independently reviewed radiology images for studies I described above or studies I have ordered. Admission date (for inpatients): 11/24/2018 * No surgery found *  * No surgery found * ASSESSMENT/PLAN: 
 
Active Problems: * No active hospital problems. * 
  
Plan: 
Admit to General Surgery Clear Liquids NPO after midnight To OR tomorrow for I&D of Right thigh by Dr. Melanie Baez Consent IV Abx Signed:  LUISA Delong-BC

## 2018-11-24 NOTE — PROGRESS NOTES
Dual skin assessment is complete with Cely Logan RN. Wound noted to left arm swollen and slightly draining, pt states the MD lanced it down in the ER. Right hip swollen, hot and tender, abscess to right hip. All other skin is CDI.

## 2018-11-24 NOTE — ED TRIAGE NOTES
Pt ambulatory to triage with small limp. Pt reports right hip pain with swollen area to hip/ buttocks area that is warm and painful to touch x 1 week. Area feels about 4x4 in area. Pt denies any drainage from the area and none noted on assessment. Pt denies fevers, N/V, urinary s/sx, but reports diarrhea.

## 2018-11-25 ENCOUNTER — ANESTHESIA (OUTPATIENT)
Dept: SURGERY | Age: 43
DRG: 464 | End: 2018-11-25
Payer: SELF-PAY

## 2018-11-25 LAB
ANION GAP SERPL CALC-SCNC: 9 MMOL/L (ref 7–16)
BUN SERPL-MCNC: 13 MG/DL (ref 6–23)
CALCIUM SERPL-MCNC: 8.3 MG/DL (ref 8.3–10.4)
CHLORIDE SERPL-SCNC: 100 MMOL/L (ref 98–107)
CO2 SERPL-SCNC: 25 MMOL/L (ref 21–32)
CREAT SERPL-MCNC: 0.85 MG/DL (ref 0.8–1.5)
ERYTHROCYTE [DISTWIDTH] IN BLOOD BY AUTOMATED COUNT: 13.9 %
GLUCOSE SERPL-MCNC: 103 MG/DL (ref 65–100)
HCT VFR BLD AUTO: 25 % (ref 41.1–50.3)
HGB BLD-MCNC: 8 G/DL (ref 13.6–17.2)
MCH RBC QN AUTO: 26.6 PG (ref 26.1–32.9)
MCHC RBC AUTO-ENTMCNC: 32 G/DL (ref 31.4–35)
MCV RBC AUTO: 83.1 FL (ref 79.6–97.8)
NRBC # BLD: 0 K/UL (ref 0–0.2)
PLATELET # BLD AUTO: 666 K/UL (ref 150–450)
PMV BLD AUTO: 9.8 FL (ref 9.4–12.3)
POTASSIUM SERPL-SCNC: 4.3 MMOL/L (ref 3.5–5.1)
RBC # BLD AUTO: 3.01 M/UL (ref 4.23–5.6)
SODIUM SERPL-SCNC: 134 MMOL/L (ref 136–145)
VANCOMYCIN TROUGH SERPL-MCNC: 11.8 UG/ML (ref 5–20)
WBC # BLD AUTO: 15.7 K/UL (ref 4.3–11.1)

## 2018-11-25 PROCEDURE — 76010000138 HC OR TIME 0.5 TO 1 HR: Performed by: SURGERY

## 2018-11-25 PROCEDURE — 74011250636 HC RX REV CODE- 250/636: Performed by: ANESTHESIOLOGY

## 2018-11-25 PROCEDURE — 85027 COMPLETE CBC AUTOMATED: CPT

## 2018-11-25 PROCEDURE — 87389 HIV-1 AG W/HIV-1&-2 AB AG IA: CPT

## 2018-11-25 PROCEDURE — 80202 ASSAY OF VANCOMYCIN: CPT

## 2018-11-25 PROCEDURE — 87205 SMEAR GRAM STAIN: CPT

## 2018-11-25 PROCEDURE — 74011250636 HC RX REV CODE- 250/636: Performed by: NURSE PRACTITIONER

## 2018-11-25 PROCEDURE — 74011250636 HC RX REV CODE- 250/636

## 2018-11-25 PROCEDURE — 65270000029 HC RM PRIVATE

## 2018-11-25 PROCEDURE — 76210000016 HC OR PH I REC 1 TO 1.5 HR: Performed by: SURGERY

## 2018-11-25 PROCEDURE — 0JBL0ZZ EXCISION OF RIGHT UPPER LEG SUBCUTANEOUS TISSUE AND FASCIA, OPEN APPROACH: ICD-10-PCS | Performed by: SURGERY

## 2018-11-25 PROCEDURE — 87075 CULTR BACTERIA EXCEPT BLOOD: CPT

## 2018-11-25 PROCEDURE — 74011250637 HC RX REV CODE- 250/637: Performed by: NURSE PRACTITIONER

## 2018-11-25 PROCEDURE — 76060000033 HC ANESTHESIA 1 TO 1.5 HR: Performed by: SURGERY

## 2018-11-25 PROCEDURE — 36415 COLL VENOUS BLD VENIPUNCTURE: CPT

## 2018-11-25 PROCEDURE — 74011250637 HC RX REV CODE- 250/637: Performed by: SURGERY

## 2018-11-25 PROCEDURE — 80074 ACUTE HEPATITIS PANEL: CPT

## 2018-11-25 PROCEDURE — 05HM33Z INSERTION OF INFUSION DEVICE INTO RIGHT INTERNAL JUGULAR VEIN, PERCUTANEOUS APPROACH: ICD-10-PCS | Performed by: ANESTHESIOLOGY

## 2018-11-25 PROCEDURE — 74011000258 HC RX REV CODE- 258

## 2018-11-25 PROCEDURE — 74011000258 HC RX REV CODE- 258: Performed by: NURSE PRACTITIONER

## 2018-11-25 PROCEDURE — 80048 BASIC METABOLIC PNL TOTAL CA: CPT

## 2018-11-25 PROCEDURE — 77030010509 HC AIRWY LMA MSK TELE -A: Performed by: ANESTHESIOLOGY

## 2018-11-25 PROCEDURE — 74011000250 HC RX REV CODE- 250

## 2018-11-25 PROCEDURE — 74011250637 HC RX REV CODE- 250/637: Performed by: ANESTHESIOLOGY

## 2018-11-25 RX ORDER — FENTANYL CITRATE 50 UG/ML
100 INJECTION, SOLUTION INTRAMUSCULAR; INTRAVENOUS ONCE
Status: DISCONTINUED | OUTPATIENT
Start: 2018-11-25 | End: 2018-11-25 | Stop reason: HOSPADM

## 2018-11-25 RX ORDER — PROPOFOL 10 MG/ML
INJECTION, EMULSION INTRAVENOUS AS NEEDED
Status: DISCONTINUED | OUTPATIENT
Start: 2018-11-25 | End: 2018-11-25 | Stop reason: HOSPADM

## 2018-11-25 RX ORDER — IBUPROFEN 200 MG
1 TABLET ORAL DAILY
Status: DISCONTINUED | OUTPATIENT
Start: 2018-11-25 | End: 2018-12-08 | Stop reason: HOSPADM

## 2018-11-25 RX ORDER — MIDAZOLAM HYDROCHLORIDE 1 MG/ML
2 INJECTION, SOLUTION INTRAMUSCULAR; INTRAVENOUS
Status: DISCONTINUED | OUTPATIENT
Start: 2018-11-25 | End: 2018-11-25 | Stop reason: HOSPADM

## 2018-11-25 RX ORDER — MIDAZOLAM HYDROCHLORIDE 1 MG/ML
5 INJECTION, SOLUTION INTRAMUSCULAR; INTRAVENOUS ONCE
Status: DISCONTINUED | OUTPATIENT
Start: 2018-11-25 | End: 2018-11-25 | Stop reason: HOSPADM

## 2018-11-25 RX ORDER — LIDOCAINE HYDROCHLORIDE 10 MG/ML
0.1 INJECTION INFILTRATION; PERINEURAL AS NEEDED
Status: DISCONTINUED | OUTPATIENT
Start: 2018-11-25 | End: 2018-12-08 | Stop reason: HOSPADM

## 2018-11-25 RX ORDER — OXYCODONE HYDROCHLORIDE 5 MG/1
5 TABLET ORAL
Status: COMPLETED | OUTPATIENT
Start: 2018-11-25 | End: 2018-11-25

## 2018-11-25 RX ORDER — SODIUM CHLORIDE, SODIUM LACTATE, POTASSIUM CHLORIDE, CALCIUM CHLORIDE 600; 310; 30; 20 MG/100ML; MG/100ML; MG/100ML; MG/100ML
75 INJECTION, SOLUTION INTRAVENOUS CONTINUOUS
Status: DISCONTINUED | OUTPATIENT
Start: 2018-11-25 | End: 2018-11-26

## 2018-11-25 RX ORDER — FENTANYL CITRATE 50 UG/ML
INJECTION, SOLUTION INTRAMUSCULAR; INTRAVENOUS AS NEEDED
Status: DISCONTINUED | OUTPATIENT
Start: 2018-11-25 | End: 2018-11-25 | Stop reason: HOSPADM

## 2018-11-25 RX ORDER — IBUPROFEN 200 MG
1 TABLET ORAL DAILY
Status: DISCONTINUED | OUTPATIENT
Start: 2018-11-26 | End: 2018-11-25

## 2018-11-25 RX ORDER — LIDOCAINE HYDROCHLORIDE 20 MG/ML
INJECTION, SOLUTION EPIDURAL; INFILTRATION; INTRACAUDAL; PERINEURAL AS NEEDED
Status: DISCONTINUED | OUTPATIENT
Start: 2018-11-25 | End: 2018-11-25 | Stop reason: HOSPADM

## 2018-11-25 RX ORDER — HYDROMORPHONE HYDROCHLORIDE 2 MG/ML
0.5 INJECTION, SOLUTION INTRAMUSCULAR; INTRAVENOUS; SUBCUTANEOUS
Status: COMPLETED | OUTPATIENT
Start: 2018-11-25 | End: 2018-11-25

## 2018-11-25 RX ORDER — HYDROCODONE BITARTRATE AND ACETAMINOPHEN 5; 325 MG/1; MG/1
2 TABLET ORAL AS NEEDED
Status: DISCONTINUED | OUTPATIENT
Start: 2018-11-25 | End: 2018-11-25

## 2018-11-25 RX ORDER — SODIUM CHLORIDE, SODIUM LACTATE, POTASSIUM CHLORIDE, CALCIUM CHLORIDE 600; 310; 30; 20 MG/100ML; MG/100ML; MG/100ML; MG/100ML
75 INJECTION, SOLUTION INTRAVENOUS CONTINUOUS
Status: DISCONTINUED | OUTPATIENT
Start: 2018-11-25 | End: 2018-11-25

## 2018-11-25 RX ADMIN — PIPERACILLIN SODIUM,TAZOBACTAM SODIUM 3.38 G: 3; .375 INJECTION, POWDER, FOR SOLUTION INTRAVENOUS at 02:12

## 2018-11-25 RX ADMIN — Medication 10 ML: at 17:09

## 2018-11-25 RX ADMIN — PROPOFOL 200 MG: 10 INJECTION, EMULSION INTRAVENOUS at 11:44

## 2018-11-25 RX ADMIN — VANCOMYCIN HYDROCHLORIDE 1000 MG: 1 INJECTION, POWDER, LYOPHILIZED, FOR SOLUTION INTRAVENOUS at 06:00

## 2018-11-25 RX ADMIN — HYDROMORPHONE HYDROCHLORIDE 0.5 MG: 2 INJECTION, SOLUTION INTRAMUSCULAR; INTRAVENOUS; SUBCUTANEOUS at 12:57

## 2018-11-25 RX ADMIN — OXYCODONE HYDROCHLORIDE AND ACETAMINOPHEN 1 TABLET: 7.5; 325 TABLET ORAL at 20:50

## 2018-11-25 RX ADMIN — HYDROMORPHONE HYDROCHLORIDE 1 MG: 1 INJECTION, SOLUTION INTRAMUSCULAR; INTRAVENOUS; SUBCUTANEOUS at 14:56

## 2018-11-25 RX ADMIN — FENTANYL CITRATE 25 MCG: 50 INJECTION, SOLUTION INTRAMUSCULAR; INTRAVENOUS at 12:05

## 2018-11-25 RX ADMIN — Medication 10 ML: at 22:00

## 2018-11-25 RX ADMIN — HYDROMORPHONE HYDROCHLORIDE 0.5 MG: 2 INJECTION, SOLUTION INTRAMUSCULAR; INTRAVENOUS; SUBCUTANEOUS at 13:08

## 2018-11-25 RX ADMIN — HYDROMORPHONE HYDROCHLORIDE 1 MG: 1 INJECTION, SOLUTION INTRAMUSCULAR; INTRAVENOUS; SUBCUTANEOUS at 22:21

## 2018-11-25 RX ADMIN — HYDROMORPHONE HYDROCHLORIDE 1 MG: 1 INJECTION, SOLUTION INTRAMUSCULAR; INTRAVENOUS; SUBCUTANEOUS at 09:53

## 2018-11-25 RX ADMIN — PIPERACILLIN SODIUM,TAZOBACTAM SODIUM 3.38 G: 3; .375 INJECTION, POWDER, FOR SOLUTION INTRAVENOUS at 18:47

## 2018-11-25 RX ADMIN — OXYCODONE HYDROCHLORIDE AND ACETAMINOPHEN 1 TABLET: 7.5; 325 TABLET ORAL at 16:57

## 2018-11-25 RX ADMIN — HYDROMORPHONE HYDROCHLORIDE 1 MG: 1 INJECTION, SOLUTION INTRAMUSCULAR; INTRAVENOUS; SUBCUTANEOUS at 18:01

## 2018-11-25 RX ADMIN — SODIUM CHLORIDE 100 ML/HR: 900 INJECTION, SOLUTION INTRAVENOUS at 14:12

## 2018-11-25 RX ADMIN — HYDROMORPHONE HYDROCHLORIDE 0.5 MG: 2 INJECTION, SOLUTION INTRAMUSCULAR; INTRAVENOUS; SUBCUTANEOUS at 12:46

## 2018-11-25 RX ADMIN — VANCOMYCIN HYDROCHLORIDE 1000 MG: 1 INJECTION, POWDER, LYOPHILIZED, FOR SOLUTION INTRAVENOUS at 18:10

## 2018-11-25 RX ADMIN — FENTANYL CITRATE 25 MCG: 50 INJECTION, SOLUTION INTRAMUSCULAR; INTRAVENOUS at 11:50

## 2018-11-25 RX ADMIN — FENTANYL CITRATE 50 MCG: 50 INJECTION, SOLUTION INTRAMUSCULAR; INTRAVENOUS at 12:15

## 2018-11-25 RX ADMIN — HYDROMORPHONE HYDROCHLORIDE 1 MG: 1 INJECTION, SOLUTION INTRAMUSCULAR; INTRAVENOUS; SUBCUTANEOUS at 06:35

## 2018-11-25 RX ADMIN — SODIUM CHLORIDE, SODIUM LACTATE, POTASSIUM CHLORIDE, AND CALCIUM CHLORIDE 75 ML/HR: 600; 310; 30; 20 INJECTION, SOLUTION INTRAVENOUS at 10:14

## 2018-11-25 RX ADMIN — FENTANYL CITRATE 25 MCG: 50 INJECTION, SOLUTION INTRAMUSCULAR; INTRAVENOUS at 12:08

## 2018-11-25 RX ADMIN — PIPERACILLIN SODIUM,TAZOBACTAM SODIUM 3.38 G: 3; .375 INJECTION, POWDER, FOR SOLUTION INTRAVENOUS at 11:37

## 2018-11-25 RX ADMIN — HYDROMORPHONE HYDROCHLORIDE 0.5 MG: 2 INJECTION, SOLUTION INTRAMUSCULAR; INTRAVENOUS; SUBCUTANEOUS at 13:02

## 2018-11-25 RX ADMIN — FENTANYL CITRATE 50 MCG: 50 INJECTION, SOLUTION INTRAMUSCULAR; INTRAVENOUS at 12:11

## 2018-11-25 RX ADMIN — HYDROMORPHONE HYDROCHLORIDE 1 MG: 1 INJECTION, SOLUTION INTRAMUSCULAR; INTRAVENOUS; SUBCUTANEOUS at 00:23

## 2018-11-25 RX ADMIN — LIDOCAINE HYDROCHLORIDE 100 MG: 20 INJECTION, SOLUTION EPIDURAL; INFILTRATION; INTRACAUDAL; PERINEURAL at 11:44

## 2018-11-25 RX ADMIN — FENTANYL CITRATE 25 MCG: 50 INJECTION, SOLUTION INTRAMUSCULAR; INTRAVENOUS at 12:03

## 2018-11-25 RX ADMIN — OXYCODONE HYDROCHLORIDE 5 MG: 5 TABLET ORAL at 13:06

## 2018-11-25 RX ADMIN — HYDROMORPHONE HYDROCHLORIDE 1 MG: 1 INJECTION, SOLUTION INTRAMUSCULAR; INTRAVENOUS; SUBCUTANEOUS at 03:52

## 2018-11-25 NOTE — PROGRESS NOTES
END OF SHIFT NOTE: 
 
INTAKE/OUTPUT 
11/24 0701 - 11/25 0700 In: -  
Out: 450 [Urine:450] Voiding: YES Catheter: NO 
Drain:   
 
 
 
 
 
Flatus: Patient does have flatus present. Stool:  0 occurrences. Characteristics: 
  
Emesis: 0 occurrences. Characteristics: VITAL SIGNS Patient Vitals for the past 12 hrs: 
 Temp Pulse Resp BP SpO2  
11/25/18 0415 99.7 °F (37.6 °C) 85 18 113/74 98 % 11/24/18 2309 99.9 °F (37.7 °C) 84 16 120/73 97 % 11/24/18 2119 99.6 °F (37.6 °C)      
11/24/18 1926 (!) 101.1 °F (38.4 °C) 88 16 106/61 96 % Pain Assessment Pain Intensity 1: 8 (11/25/18 0023) Pain Location 1: Hip Pain Intervention(s) 1: Medication (see MAR) Patient Stated Pain Goal: 0 Ambulating Yes Shift report given to oncoming nurse at the bedside. Vanessa Dandy

## 2018-11-25 NOTE — ANESTHESIA POSTPROCEDURE EVALUATION
Procedure(s): 
I&D OF LEFT THIGH ABSCESS. Anesthesia Post Evaluation Multimodal analgesia: multimodal analgesia not used between 6 hours prior to anesthesia start to PACU discharge Patient location during evaluation: bedside Patient participation: complete - patient participated Level of consciousness: awake and alert Pain score: 3 Pain management: adequate Airway patency: patent Anesthetic complications: no 
Cardiovascular status: acceptable and hemodynamically stable Respiratory status: acceptable Hydration status: acceptable Visit Vitals /86 Pulse 79 Temp 37 °C (98.6 °F) Resp 9 Ht 6' (1.829 m) Wt 68 kg (150 lb) SpO2 100% BMI 20.34 kg/m²

## 2018-11-25 NOTE — PERIOP NOTES
TRANSFER - OUT REPORT: 
 
Verbal report given to SCOTT Rivera on Luis Alba  being transferred to [facility] for routine post - op Report consisted of patients Situation, Background, Assessment and  
Recommendations(SBAR). Information from the following report(s) Procedure Summary, Intake/Output, MAR and Recent Results was reviewed with the receiving nurse. Lines:  
Quad Lumen 11/25/18 Right Internal jugular (Active) Central Line Being Utilized Yes 11/25/2018  1:07 PM  
Criteria for Appropriate Use Limited/no vessel suitable for conventional peripheral access 11/25/2018  1:07 PM  
Site Assessment Clean, dry, & intact 11/25/2018  1:07 PM  
Infiltration Assessment 0 11/25/2018  1:07 PM  
Affected Extremity/Extremities Color distal to insertion site pink (or appropriate for race); Pulses palpable 11/25/2018  1:07 PM  
Dressing Status Clean, dry, & intact 11/25/2018  1:07 PM  
Dressing Type Tape;Transparent 11/25/2018  1:07 PM  
   
Peripheral IV 11/25/18 Left External jugular (Active) Site Assessment Clean, dry, & intact 11/25/2018 10:09 AM  
Phlebitis Assessment 0 11/25/2018 10:09 AM  
Infiltration Assessment 0 11/25/2018 10:09 AM  
Dressing Status Clean, dry, & intact 11/25/2018 10:09 AM  
Dressing Type Tape;Transparent 11/25/2018 10:09 AM  
Hub Color/Line Status Flushed; Infusing;Patent 11/25/2018 10:09 AM  
Alcohol Cap Used No 11/25/2018 10:09 AM  
  
 
Opportunity for questions and clarification was provided. Patient transported with: 
 Tech 
 
VTE prophylaxis orders have been written for Luis Alba. Patient and family given floor number and nurses name. Family updated re: pt status after security code verified.

## 2018-11-25 NOTE — ANESTHESIA PREPROCEDURE EVALUATION
Anesthetic History No history of anesthetic complications Review of Systems / Medical History Patient summary reviewed and pertinent labs reviewed Pulmonary Within defined limits Neuro/Psych Within defined limits Cardiovascular Within defined limits Exercise tolerance: >4 METS 
  
GI/Hepatic/Renal 
  
GERD: well controlled Endo/Other Anemia Other Findings Comments:  drug abuse Physical Exam 
 
Airway Mallampati: II 
TM Distance: 4 - 6 cm Neck ROM: normal range of motion Mouth opening: Normal 
 
 Cardiovascular Regular rate and rhythm,  S1 and S2 normal,  no murmur, click, rub, or gallop Dental 
 
Dentition: Full upper dentures Pulmonary Breath sounds clear to auscultation Abdominal 
 
 
 
 Other Findings Anesthetic Plan ASA: 3, emergent Anesthesia type: general 
 
Monitoring Plan: CVP Induction: Intravenous Anesthetic plan and risks discussed with: Patient and Family Needs central line for IV access

## 2018-11-25 NOTE — BRIEF OP NOTE
BRIEF OPERATIVE NOTE Date of Procedure: 11/25/2018 Preoperative Diagnosis: right thigh abscess Postoperative Diagnosis: right thigh abscess, extensive necrotizing fasciitis with intramuscular abscesses extending around right hip and proximal femur Procedure(s): 
Excisional debridement of necrotizing fasciitis of right thigh and drainage of extensive intramuscular abscesses Surgeon(s) and Role: 
   Sarah Kitchen MD - Primary Surgical Staff: 
Circ-1: Antonette Guthrie RN Scrub Tech-1: Mee Hilliard Event Time In Time Out Incision Start 11/25/2018 12:04 PM   
Incision Close 11/25/2018 12:24 PM   
 
Anesthesia: General  
Estimated Blood Loss: 100 ml Specimens: abscess Findings: as post op diagnosis Complications: none Implants: packing

## 2018-11-25 NOTE — ANESTHESIA PROCEDURE NOTES
Central Line Placement Start time: 11/25/2018 11:18 AM 
End time: 11/25/2018 11:23 AM 
Performed by: Marcelle Serna MD 
Authorized by: Marcelle Serna MD  
 
Indications: sepsis protocol and vascular access Preanesthetic Checklist: patient identified, risks and benefits discussed, anesthesia consent, site marked, patient being monitored and timeout performed Timeout Time: 11:15 Pre-procedure: All elements of maximal sterile barrier technique followed? Yes   
2% Chlorhexidine for cutaneous antisepsis, Hand hygiene performed prior to catheter insertion and Ultrasound guidance Sterile Ultrasound Technique followed?: Yes Ultrasound Image Stored? Image stored Procedure:  
Prep:  ChloraPrep Location:  Internal jugular Orientation:  Right Patient position:  Trendelenburg Catheter type:  Quad lumen Catheter size:  8 Fr Catheter length:  12 cm Number of attempts:  1 Successful placement: Yes Assessment:  
Post-procedure:  Catheter secured, sterile dressing applied and sterile dressing with CHG applied Assessment:  Free fluid flow, blood return through all ports and guidewire removal verified Insertion:  Uncomplicated Patient tolerance:  Patient tolerated the procedure well with no immediate complications

## 2018-11-25 NOTE — PROGRESS NOTES
PICC line ordered; spoke with primary nurse, Prasanna Cloud and notified her that pt already has a right quad lumen placed today by anesthesiologist. Asked her to get clarification on PICC order.

## 2018-11-25 NOTE — PROGRESS NOTES
Pt pulled EJ line out. Several nurses attempted to place another peripheral line with no success. Message left for vascular access team. Message also left with ER nurse Gaviota who is qualified to place EJ lines. IV zosyn and IV vanc currently held. Will restart when IV placed and will communicate to pharmacy about re timing.

## 2018-11-25 NOTE — PROGRESS NOTES
Pharmacokinetic Consult to Pharmacist 
 
Samir Nomi is a 44 y.o. male being treated for SSTI with Vancomycin and zosyn. Height: 6' (182.9 cm)  Weight: 68 kg (150 lb) Lab Results Component Value Date/Time BUN 17 11/24/2018 10:59 AM  
 Creatinine 1.09 11/24/2018 10:59 AM  
 WBC 20.8 (H) 11/24/2018 10:59 AM  
 Procalcitonin 0.1 11/24/2018 10:59 AM  
 Lactic Acid (POC) 0.62 11/24/2018 01:57 PM  
  
Estimated Creatinine Clearance: 84.9 mL/min (based on SCr of 1.09 mg/dL). CULTURES: 
11/24 BCx: pending Day 1 of vancomycin. Goal trough is 10-20. Vancomycin dose initiated at 1g q 8hr. Will continue to follow patient. Thank you, 
Sergey Yates, PharmD Clinical Pharmacist 
[phone]

## 2018-11-25 NOTE — PERIOP NOTES
TRANSFER - IN REPORT: 
 
Verbal report received from Vivian Lorenzo RN on Rajendra Rollinser being received from [id number] for ordered procedure Report consisted of patients Situation, Background, Assessment and Recommendations(SBAR). Information from the following report(s) SBAR, Kardex, [facility], Recent Results and Procedure Verification was reviewed with the receiving nurse. Opportunity for questions and clarification was provided. Assessment completed upon patients arrival to unit and care assumed.

## 2018-11-25 NOTE — CONSULTS
Infectious Disease Consult    Today's Date: 11/25/2018   Admit Date: 11/24/2018    Impression:   · Necrotizing fasciitis right thigh s/p I&D 11/25/18; operative cx pending  · Heroin abuse; \"skin pops\"  · Tobacco abuse  · Medical non-adherence  · Fever    Plan:   · Continue Vancomycin and Zosyn  · Follow op cx  · Place PICC today  · Screen for HIV and hepatitis  · Likely not a good OPAT candidate    Anti-infectives:   · IV Vancomycin (11/24-  · IV Zosyn (11/24-    Subjective:   Date of Consultation:  November 25, 2018  Referring Physician:     Patient is a 44 y.o. male  history of GERD and heroin abuse presents with pain in his right hip region for the past few weeks. Lety Vasquez admits to falling 4 days prior to the development of pain.  He reports subjective fevers.  He states now it is very difficult to walk due to pain.  Denies similar symptoms in the past.  No radiation of pain.  Patient is quite a poor historian.  Reviewed chart and noted that he had been seen in the emergency department November 5, 12th, and 18th.  November 5th, he was seen for fever, Nov 12 for abdominal pain and heroin use, November 18 for the right hip pain.   At that visit he was found to have significant leukocytosis and after further search was found to have a large left arm abscess that was drained.  He was prescribed PO Clindamycin.  Patient states he took all of this antibiotic, but interestingly, it was only prescribed 6 days ago. Verta Hatchet admits that he \"overtook it. \" Lety Vasquez also never returned for packing removal or dressing change. Lety Vasquez states he has been too focused on the pain in his right hip. No prior hip surgeries. He states last heroin use was 4 days ago and snorts it. Lety Vasquez denies any IV drug use. Blood cultures NGTD X 2.  S/P I&D today. Patient was started on IV Vancomycin and Zosyn 11/24/18.   ID is asked to evaluate patient for abx recommendations.           Patient Active Problem List   Diagnosis Code    Abscess of thigh L02.419 Past Medical History:   Diagnosis Date    GERD (gastroesophageal reflux disease)       History reviewed. No pertinent family history. Social History     Tobacco Use    Smoking status: Current Every Day Smoker     Packs/day: 0.50    Smokeless tobacco: Never Used   Substance Use Topics    Alcohol use: No     Comment: seldom     Past Surgical History:   Procedure Laterality Date    HX OTHER SURGICAL      hernia       Prior to Admission medications    Medication Sig Start Date End Date Taking? Authorizing Provider   clindamycin (CLEOCIN) 300 mg capsule Take 1 Cap by mouth four (4) times daily for 7 days. Pharmacist may switch to 150mg clindamycin, and double the number dispensed to 56, and change sig. To 2 p.o qid,  If it is less expensive 11/19/18 11/26/18  Dennys Dougherty MD   ibuprofen (MOTRIN) 800 mg tablet Take 1 Tab by mouth every eight (8) hours as needed for Pain for up to 7 days. 11/19/18 11/26/18  Dennys Dougherty MD   chlordiazePOXIDE (LIBRIUM) 5 mg capsule Take 1 Cap by mouth three (3) times daily as needed for Anxiety (Take 3 pills every 8 hourse the first 2 days. Take 2 pills every 8 hourse the second 2 days. Take one pill every 8 hours the third 2 days. Then as needed after that. ). Max Daily Amount: 15 mg. 11/12/18   Chula Garcia III, MD   morphine IR (MS IR) 15 mg tablet Take 1 Tab by mouth every six (6) hours as needed for Pain. Max Daily Amount: 60 mg. 1/7/18   Dayami Colon MD   ondansetron (ZOFRAN ODT) 8 mg disintegrating tablet Take 1 Tab by mouth every eight (8) hours as needed for Nausea. 1/7/18   Dayami Colon MD   mupirocin (BACTROBAN) 2 % ointment applied to the inside of both nostrils twice daily for 7 days 1/7/18   Dayami Colon MD   chlorhexidine (HIBICLENS) 4 % liquid Wash once daily, avoiding genitals and eyes and allow to stay on for 30 seconds then wash off.  1 week duration.  1/7/18   Tima Freeman MD   acetaminophen-codeine (TYLENOL-CODEINE #3) 300-30 mg per tablet Take 1 Tab by mouth every four (4) hours as needed for Pain for up to 20 doses. Max Daily Amount: 6 Tabs. 17   USAMA Shirley       No Known Allergies     Review of Systems:  A comprehensive review of systems was negative except for that written in the History of Present Illness. Objective:     Visit Vitals  /80   Pulse 82   Temp 98.6 °F (37 °C)   Resp 16   Ht 6' (1.829 m)   Wt 68 kg (150 lb)   SpO2 100%   BMI 20.34 kg/m²     Temp (24hrs), Av.2 °F (37.3 °C), Min:98.2 °F (36.8 °C), Max:101.1 °F (38.4 °C)       Lines:  Peripheral IV:       Physical Exam:    General:  Alert, cooperative, well noursished, well developed, appears stated age   Eyes:  Sclera anicteric. Pupils equally round and reactive to light. Mouth/Throat: Mucous membranes normal, oral pharynx clear   Neck: Supple   Lungs:   Clear to auscultation bilaterally, good effort   CV:  Regular rate and rhythm,no murmur, click, rub or gallop   Abdomen:   Soft, non-tender.  bowel sounds normal. non-distended   Extremities: No cyanosis or edema   Skin: Skin color, texture, turgor normal. no acute rash or lesions, large lateral right thigh wd dressing with bloody drainage   Lymph nodes: Cervical and supraclavicular normal   Musculoskeletal: No swelling or deformity   Lines/Devices:  Intact, no erythema, drainage or tenderness   Psych: Alert and oriented, normal mood affect given the setting       Data Review:     CBC:  Recent Labs     18  0618  1059   WBC 15.7* 20.8*   GRANS  --  84*   MONOS  --  4   EOS  --  0*   ANEU  --  17.4*   ABL  --  2.3   HGB 8.0* 8.3*   HCT 25.0* 26.8*   * 740*       BMP:  Recent Labs     18  0617 18  1059   CREA 0.85 1.09   BUN 13 17   * 134*   K 4.3 4.0    101   CO2 25 26   AGAP 9 7   * 102*       LFTS:  Recent Labs     18  1059   TBILI 0.4   ALT 17   SGOT 19   AP 64   TP 9.1*   ALB 2.3*       Microbiology:     All Micro Results Procedure Component Value Units Date/Time    CULTURE, BLOOD [[id number]] Collected:  11/24/18 1351    Order Status:  Completed Specimen:  Whole Blood Updated:  11/25/18 0645     Special Requests: --        RIGHT  JUGULAR VEIN       Culture result: NO GROWTH AFTER 16 HOURS       CULTURE, BLOOD [[id number]] Collected:  11/24/18 1400    Order Status:  Completed Specimen:  Blood Updated:  11/25/18 0645     Special Requests: NO SPECIAL REQUESTS        Culture result: NO GROWTH AFTER 16 HOURS             Imaging:   IMPRESSION:    1.   Abscess collections within the right lateral hip and proximal thigh soft  tissues as described above.        Signed By: Sunni Madera NP     November 25, 2018

## 2018-11-26 PROBLEM — M72.6 NECROTIZING FASCIITIS (HCC): Status: ACTIVE | Noted: 2018-11-26

## 2018-11-26 LAB — VANCOMYCIN TROUGH SERPL-MCNC: 12.7 UG/ML (ref 5–20)

## 2018-11-26 PROCEDURE — 77030018836 HC SOL IRR NACL ICUM -A

## 2018-11-26 PROCEDURE — 77030020263 HC SOL INJ SOD CL0.9% LFCR 1000ML

## 2018-11-26 PROCEDURE — 97606 NEG PRS WND THER DME>50 SQCM: CPT

## 2018-11-26 PROCEDURE — 74750000023 HC WOUND THERAPY

## 2018-11-26 PROCEDURE — 74011250636 HC RX REV CODE- 250/636: Performed by: NURSE PRACTITIONER

## 2018-11-26 PROCEDURE — 74011250637 HC RX REV CODE- 250/637: Performed by: NURSE PRACTITIONER

## 2018-11-26 PROCEDURE — 74011250636 HC RX REV CODE- 250/636: Performed by: INTERNAL MEDICINE

## 2018-11-26 PROCEDURE — 77030019952 HC CANSTR VAC ASST KCON -B: Performed by: ANESTHESIOLOGY

## 2018-11-26 PROCEDURE — 77030019952 HC CANSTR VAC ASST KCON -B

## 2018-11-26 PROCEDURE — 77030019934 HC DRSG VAC ASST KCON -B

## 2018-11-26 PROCEDURE — 65270000029 HC RM PRIVATE

## 2018-11-26 PROCEDURE — 74011000258 HC RX REV CODE- 258: Performed by: NURSE PRACTITIONER

## 2018-11-26 PROCEDURE — 80202 ASSAY OF VANCOMYCIN: CPT

## 2018-11-26 RX ORDER — HYDROMORPHONE HYDROCHLORIDE 1 MG/ML
1 INJECTION, SOLUTION INTRAMUSCULAR; INTRAVENOUS; SUBCUTANEOUS
Status: DISCONTINUED | OUTPATIENT
Start: 2018-11-26 | End: 2018-12-01

## 2018-11-26 RX ADMIN — Medication 10 ML: at 05:43

## 2018-11-26 RX ADMIN — Medication 10 ML: at 14:00

## 2018-11-26 RX ADMIN — HYDROMORPHONE HYDROCHLORIDE 1 MG: 1 INJECTION, SOLUTION INTRAMUSCULAR; INTRAVENOUS; SUBCUTANEOUS at 16:33

## 2018-11-26 RX ADMIN — HYDROMORPHONE HYDROCHLORIDE 1 MG: 1 INJECTION, SOLUTION INTRAMUSCULAR; INTRAVENOUS; SUBCUTANEOUS at 02:57

## 2018-11-26 RX ADMIN — OXYCODONE HYDROCHLORIDE AND ACETAMINOPHEN 1 TABLET: 7.5; 325 TABLET ORAL at 09:20

## 2018-11-26 RX ADMIN — PIPERACILLIN SODIUM,TAZOBACTAM SODIUM 3.38 G: 3; .375 INJECTION, POWDER, FOR SOLUTION INTRAVENOUS at 01:34

## 2018-11-26 RX ADMIN — OXYCODONE HYDROCHLORIDE AND ACETAMINOPHEN 1 TABLET: 7.5; 325 TABLET ORAL at 13:30

## 2018-11-26 RX ADMIN — HYDROMORPHONE HYDROCHLORIDE 1 MG: 1 INJECTION, SOLUTION INTRAMUSCULAR; INTRAVENOUS; SUBCUTANEOUS at 10:34

## 2018-11-26 RX ADMIN — SODIUM CHLORIDE 100 ML/HR: 900 INJECTION, SOLUTION INTRAVENOUS at 16:33

## 2018-11-26 RX ADMIN — PIPERACILLIN SODIUM,TAZOBACTAM SODIUM 3.38 G: 3; .375 INJECTION, POWDER, FOR SOLUTION INTRAVENOUS at 09:10

## 2018-11-26 RX ADMIN — VANCOMYCIN HYDROCHLORIDE 1000 MG: 1 INJECTION, POWDER, LYOPHILIZED, FOR SOLUTION INTRAVENOUS at 01:34

## 2018-11-26 RX ADMIN — SODIUM CHLORIDE 100 ML/HR: 900 INJECTION, SOLUTION INTRAVENOUS at 05:00

## 2018-11-26 RX ADMIN — HYDROMORPHONE HYDROCHLORIDE 1 MG: 1 INJECTION, SOLUTION INTRAMUSCULAR; INTRAVENOUS; SUBCUTANEOUS at 07:32

## 2018-11-26 RX ADMIN — PIPERACILLIN SODIUM,TAZOBACTAM SODIUM 3.38 G: 3; .375 INJECTION, POWDER, FOR SOLUTION INTRAVENOUS at 17:55

## 2018-11-26 RX ADMIN — VANCOMYCIN HYDROCHLORIDE 1000 MG: 1 INJECTION, POWDER, LYOPHILIZED, FOR SOLUTION INTRAVENOUS at 17:55

## 2018-11-26 RX ADMIN — OXYCODONE HYDROCHLORIDE AND ACETAMINOPHEN 1 TABLET: 7.5; 325 TABLET ORAL at 05:38

## 2018-11-26 RX ADMIN — HYDROMORPHONE HYDROCHLORIDE 1 MG: 1 INJECTION, SOLUTION INTRAMUSCULAR; INTRAVENOUS; SUBCUTANEOUS at 20:45

## 2018-11-26 RX ADMIN — VANCOMYCIN HYDROCHLORIDE 1000 MG: 1 INJECTION, POWDER, LYOPHILIZED, FOR SOLUTION INTRAVENOUS at 09:09

## 2018-11-26 NOTE — PROGRESS NOTES
END OF SHIFT NOTE: 
 
INTAKE/OUTPUT 
11/25 0701 - 11/26 0700 In: 2405 [P.O.:50; I.V.:2355] Out: 825 [Urine:725] Voiding: YES Catheter: NO 
Drain:   
 
 
 
 
 
Flatus: Patient does have flatus present. Stool:  0 occurrences. Characteristics: 
  
Emesis: 0 occurrences. Characteristics: VITAL SIGNS Patient Vitals for the past 12 hrs: 
 Temp Pulse Resp BP SpO2  
11/26/18 0400 98.8 °F (37.1 °C) 76 16 123/81 98 % 11/26/18 0000 98.9 °F (37.2 °C) 78 16 126/80 98 % 11/25/18 1953 99 °F (37.2 °C) 88 16 122/76 98 % Pain Assessment Pain Intensity 1: 8 (11/25/18 2330) Pain Location 1: Leg 
Pain Intervention(s) 1: Medication (see MAR) Patient Stated Pain Goal: 0 Ambulating No 
 
Shift report given to oncoming nurse at the bedside.  
 
Bruna Gomez RN

## 2018-11-26 NOTE — PROGRESS NOTES
PLAN: 
S/p I&D of very large abscess; close to hip joint Continue ABX per ID recommendations (Vanc/Zosyn)-anticipating 5-7 days of IV ABX Place PICC per ID recommendations Ask wound team to assess for vac; if not a good candidate will continue wet to dry dressings Patient will likely not be able to do outpatient ABX Hospitalist willing to take over as primary ASSESSMENT:  Admit Date: 11/24/2018 1 Day Post-Op  Right thigh incision and drainage Active Problems: Abscess of thigh (11/24/2018) HPI:Patient is a 44 y.o. male  history of GERD and heroin abuse presents with pain in his right hip region for the past few weeks. Rohit Bhagat admits to falling 4 days prior to the development of pain.  He reports subjective fevers.  He states now it is very difficult to walk due to pain.  Denies similar symptoms in the past.  No radiation of pain.  Patient is quite a poor historian.  Reviewed chart and noted that he had been seen in the emergency department November 5, 12th, and 18th.  November 5th, he was seen for fever, Nov 12 for abdominal pain and heroin use, November 18 for the right hip pain.   At that visit he was found to have significant leukocytosis and after further search was found to have a large left arm abscess that was drained.  He was prescribed PO Clindamycin.  Patient states he took all of this antibiotic, but interestingly, it was only prescribed 6 days ago. Claire Amanda admits that he \"overtook it. \" Rohit Bhagat also never returned for packing removal or dressing change. Rohit Bhagat states he has been too focused on the pain in his right hip. No prior hip surgeries. He states last heroin use was 4 days ago and snorts it. Rohit Bhagat denies any IV drug use. Blood cultures NGTD X 2.  S/P I&D today. Patient was started on IV Vancomycin and Zosyn 11/24/18. ID is asked to evaluate patient for abx recommendations. SUBJECTIVE: 
Awake in bed, reports improved pain. S/p right thigh I&D. No AM labs. ID following. Intake/Output Summary (Last 24 hours) at 11/26/2018 0845 Last data filed at 11/26/2018 0400 Gross per 24 hour Intake 2161 ml Output 825 ml Net 1336 ml OBJECTIVE: 
Constitutional: Alert oriented cooperative patient in no acute distress; appears stated age Visit Vitals /85 (BP 1 Location: Right arm, BP Patient Position: At rest) Pulse 83 Temp 98.9 °F (37.2 °C) Resp 16 Ht 6' (1.829 m) Wt 150 lb (68 kg) SpO2 100% BMI 20.34 kg/m² Eyes:Sclera are clear. ENMT: no external lesions gross hearing normal; no obvious neck masses, no ear or lip lesions CV: RRR. Normal perfusion Resp: No JVD. Breathing is  non-labored; no audible wheezing. GI: soft and non-distended Skin: right lateral thigh with surgical dressing c/d/i Musculoskeletal: unremarkable with normal function. No embolic signs or cyanosis. Neuro:  Oriented; moves all 4; no focal deficits Psychiatric: normal affect and mood, no memory impairment Patient Vitals for the past 24 hrs: 
 BP Temp Pulse Resp SpO2  
11/26/18 0718 147/85 98.9 °F (37.2 °C) 83 16 100 % 11/26/18 0400 123/81 98.8 °F (37.1 °C) 76 16 98 % 11/26/18 0000 126/80 98.9 °F (37.2 °C) 78 16 98 % 11/25/18 1953 122/76 99 °F (37.2 °C) 88 16 98 % 11/25/18 1355 115/80 98.6 °F (37 °C) 82 16 100 % 11/25/18 1340   81 10 96 % 11/25/18 1337 129/82  82 10 98 % 11/25/18 1332 132/82  73 10 97 % 11/25/18 1327 131/80  73 10 95 % 11/25/18 1322 127/82  76 11 97 % 11/25/18 1317 135/79  74 12 97 % 11/25/18 1312 127/77  71 10 97 % 11/25/18 1307 134/83 98.4 °F (36.9 °C) 70 12 95 % 11/25/18 1302 131/80  74 12 97 % 11/25/18 1257 140/88  74 11 100 % 11/25/18 1252 137/82  77 10 100 % 11/25/18 1247 135/86  79 9 100 % 11/25/18 1242 124/82  79 11 98 % 11/25/18 1238 134/78  77 10 100 % 11/25/18 1237 134/78 98.6 °F (37 °C) 83 16 100 % 11/25/18 1236  98.6 °F (37 °C)     
11/25/18 1007 109/72 98.2 °F (36.8 °C) 86 16 97 % Labs:   
Recent Labs  
  11/25/18 
0617 11/24/18 
1059 WBC 15.7* 20.8* HGB 8.0* 8.3*  
* 740* * 134* K 4.3 4.0  
 101 CO2 25 26 BUN 13 17 CREA 0.85 1.09  
* 102* TBILI  --  0.4 SGOT  --  19 ALT  --  17  
AP  --  64 Signed:  Bill Moy, NP

## 2018-11-26 NOTE — PROGRESS NOTES
Infectious Disease Progress Note Today's Date: 2018 Admit Date: 2018 Impression: · Necrotizing fasciitis right thigh s/p I&D 18; operative cx  so far without growth. Blood cx  negative. Blood cx  no growth to date. WBC down. Fever resolved. · Debrided area close to hip joint. CT as below. Surgery exploring wound vac options. · Heroin abuse; \"skin pops\": states had injected right hip/thigh area multiple times. · Tobacco abuse · Medical non-adherence Plan:  
· Continue current antibiotics and follow cultures. · Check MRSA/MSSA nares for colonization status. · Discussed with him that overall favor up to 4 weeks therapy at this point and given history he is not a candidate for OPAT. He voiced understanding. · Discussed stopping use heroin and support network at home. Recommend 12 step program.  
 
Anti-infectives:  
Vanc (- Zosyn (- Subjective:  
 
Thigh hurts less. Review of Systems:  A comprehensive review of systems was negative except for that written in the History of Present Illness. Objective:  
 
Visit Vitals /85 (BP 1 Location: Right arm, BP Patient Position: At rest) Pulse 83 Temp 98.9 °F (37.2 °C) Resp 16 Ht 6' (1.829 m) Wt 68 kg (150 lb) SpO2 100% BMI 20.34 kg/m² Temp (24hrs), Av.7 °F (37.1 °C), Min:98.2 °F (36.8 °C), Max:99 °F (37.2 °C) Lines:  none Physical Exam: Exam:   
 General: NC/AT, alert and cooperative, looks stated age, in NAD HEENT: PERRL, non-icteric sclera, edentulous Neck: supple, symmetric, no masses or lymphadenopathy Cardiac:Nl S1/S2, no murmurs/rubs/gallops/clicks, no LE edema Pulmonary:clear bilaterally without rales or wheezes, good air movement Abdomen: soft, non-tender, non-distended, BS normal 
 Gentital:external genitalia normal Gonzales : none Skin:no rashes, lesions or wounds MSK:no enlarged or swollen joints in limbs or sternoclavicular area Extremities: no cords/clots/cyanosis, pulses palpable and symmetric Right thigh area remains somewhat swollen; not hot or red Psychiatric: alert and oriented x 3, mood and affect appropriate to situation Data Review: CBC:  
Recent Labs  
  11/25/18 
0617 11/24/18 
1059 WBC 15.7* 20.8*  
RBC 3.01* 3.15* HGB 8.0* 8.3* HCT 25.0* 26.8*  
* 740* GRANS  --  84* LYMPH  --  11* EOS  --  0* CMP:  
Recent Labs  
  11/25/18 
0617 11/24/18 
1059 * 102* * 134* K 4.3 4.0  
 101 CO2 25 26 BUN 13 17 CREA 0.85 1.09  
CA 8.3 8.7 AGAP 9 7 AP  --  64  
TP  --  9.1* ALB  --  2.3*  
GLOB  --  6.8* AGRAT  --  0.3* Liver Enzymes:  
Recent Labs  
  11/24/18 
1059 TP 9.1* ALB 2.3* AP 64 SGOT 19 Microbiology:  
 
All Micro Results Procedure Component Value Units Date/Time Paul Cleveland STAIN [[id number]] Collected:  11/25/18 1220 Order Status:  Completed Specimen:  Wound from Abscess Updated:  11/26/18 5243 Special Requests: --     
  RIGHT THIGH 
  
  GRAM STAIN PENDING Culture result: NO GROWTH 1 DAY     
 CULTURE, ANAEROBIC [[id number]] Collected:  11/25/18 1220 Order Status:  Completed Specimen:  Abscess Updated:  11/26/18 8759 Special Requests: --     
  RIGHT THIGH Culture result:    
  NO ANAEROBIC GROWTH IN 1 DAY  
     
 CULTURE, BLOOD [[id number]] Collected:  11/24/18 1351 Order Status:  Completed Specimen:  Whole Blood Updated:  11/26/18 0737 Special Requests: --     
  RIGHT JUGULAR VEIN Culture result: NO GROWTH 2 DAYS     
 CULTURE, BLOOD [[id number]] Collected:  11/24/18 1400 Order Status:  Completed Specimen:  Blood Updated:  11/26/18 0737 Special Requests: NO SPECIAL REQUESTS Culture result: NO GROWTH 2 DAYS Imaging:  
 
CT right hip with contrast 11/24/2018 
  
CLINICAL HISTORY: Right hip pain and swelling to right hip and buttocks that is warm and painful to touch for one week. 
  
TECHNIQUE: Multiple contiguous helical CT images were obtained to the right hip 
with images reconstructed in the axial plane at 2.5 mm intervals following the 
uneventful intravenous administration of 100 mL Isovue-370. 2-D sagittal, and 
coronal reconstructed images were made and reviewed. All CT scans performed at 
this facility use one or all of the following: Automated exposure control, 
adjustment of the mA and/or kVp according to patient's size, iterative 
reconstruction.  
  
Findings:   
Abnormal enhancing fluid collections are seen within the right lateral hip, and 
right proximal thigh soft tissues is appearance is concerning for multiple 
abscess collections within the soft tissues. These begin in the lateral hip soft 
tissues just inferior to the level of the right iliac crest. The subsequently 
extended into the proximal right thigh soft tissues with some involvement felt 
to be seen in all compartments of the proximal right thigh although most 
prominently involving the posterior compartment. The largest distinct collection 
appears to bridge the anterior and posterior compartments of the proximal right 
thigh seen on axial image 68 measuring 11.4 cm x 4.5 cm in greatest transverse 
dimensions. Note is made that some persistent abscess is seen on the most 
inferior image seen in the lateral compartment. Therefore, the distal extent of 
these changes is uncertain. Early osteomyelitis can be missed by CT imaging. However, no clear bony destructive changes are seen to strongly suggest 
osteomyelitis. No abnormal soft tissue gas is seen. Likely reactive prominent 
right groin lymph nodes are seen. 
  
IMPRESSION IMPRESSION:   
1. Abscess collections within the right lateral hip and proximal thigh soft 
tissues as described above. Signed By: Vinh Carver MD   
 November 26, 2018

## 2018-11-26 NOTE — WOUND CARE
Right thigh wound [id number] with muscle and tendon exposed pink viable tissue, serosanguineous drainage no odor. Adaptic in base, 2 pieces foam, wound vac applied. Will monitor.

## 2018-11-26 NOTE — PROGRESS NOTES
END OF SHIFT NOTE: 
 
INTAKE/OUTPUT 
11/24 0701 - 11/25 0700 In: -  
Out: 450 [Urine:450] Voiding: YES Catheter: NO 
Drain:   
 
 
 
 
 
Flatus: Patient does have flatus present. Stool:  0 occurrences. Characteristics: 
  
Emesis: 0 occurrences. Characteristics: VITAL SIGNS Patient Vitals for the past 12 hrs: 
 Temp Pulse Resp BP SpO2  
11/25/18 1355 98.6 °F (37 °C) 82 16 115/80 100 % 11/25/18 1340  81 10  96 % 11/25/18 1337  82 10 129/82 98 % 11/25/18 1332  73 10 132/82 97 % 11/25/18 1327  73 10 131/80 95 % 11/25/18 1322  76 11 127/82 97 % 11/25/18 1317  74 12 135/79 97 % 11/25/18 1312  71 10 127/77 97 % 11/25/18 1307 98.4 °F (36.9 °C) 70 12 134/83 95 % 11/25/18 1302  74 12 131/80 97 % 11/25/18 1257  74 11 140/88 100 % 11/25/18 1252  77 10 137/82 100 % 11/25/18 1247  79 9 135/86 100 % 11/25/18 1242  79 11 124/82 98 % 11/25/18 1238  77 10 134/78 100 % 11/25/18 1237 98.6 °F (37 °C) 83 16 134/78 100 % 11/25/18 1236 98.6 °F (37 °C)      
11/25/18 1007 98.2 °F (36.8 °C) 86 16 109/72 97 % 11/25/18 0735 98.8 °F (37.1 °C) 84 17 115/72 97 % Pain Assessment Pain Intensity 1: 5 (11/25/18 1343) Pain Location 1: Incisional, Leg 
Pain Intervention(s) 1: Emotional support Patient Stated Pain Goal: 0 Ambulating No 
 
Shift report given to oncoming nurse at the bedside.  
 
Kenn Mo RN

## 2018-11-26 NOTE — OP NOTES
[facility] REPORT    Semaj Vidal  MR#: [id number]  : 1975  ACCOUNT #: [de-identified]   DATE OF SERVICE: 2018    SURGEON:  Dariana Zamora MD    ANESTHESIA:  general    PREOPERATIVE DIAGNOSIS:  Right thigh abscess. POSTOPERATIVE DIAGNOSIS:  Right thigh abscess with extensive necrotizing fasciitis and intramuscular abscesses extending around the right hip and proximal femur. PROCEDURE PERFORMED:  Excisional debridement of necrotizing fasciitis of right thigh with drainage of extensive intramuscular abscesses. SPECIMENS REMOVED:  As above    COMPLICATIONS:  none    IMPLANTS: packing    INDICATION:  This is a 81-year-old male who presented to the emergency room with right side swelling and he said he fell a week ago. He is also a documented drug user and CT scan obtained consistent with a large complex abscess extending in the muscle and around the hip and the femur. Surgery offered to him. He understood risks and benefits, agreed to proceed. FINDINGS:  Indeed, this is a large abscess extending around the right hip and extended behind the femur to the left side. His fascia is necrosed. PROCEDURE:  After informed consent obtained, the patient brought into the operating room, left in supine position. General anesthesia was administered. The patient's right thigh and hip was prepped and draped in routine fashion. A longitudinal incision was made lateral to the thigh and dissection carried through the dermal layer and then carried under the fascia and the muscle. A large abscess cavity was immediately encountered and a culture was sent and then with finger dissection, I followed the abscess cavity.   The findings as described above and the abscess extended around the right hip joint and also extended behind the femur to the left side and his fascia is all necrosed and this was debrided with the Bovie and then with a finger fracture the necrosed tissue was debrided as much as I could and then copious irrigation was used. Good hemostasis obtained. Then, the cavity was packed with Kerlix. Patient tolerated the procedure well and was transferred to recovery room in stable condition. All the instrument counts and lap counts were correct. ESTIMATED BLOOD LOSS:  About 100 mL.       Adelaide Granados MD       BY /   D: 11/25/2018 12:41     T: 11/25/2018 21:35  JOB #: [id number]

## 2018-11-27 PROBLEM — G47.09 OTHER INSOMNIA: Status: ACTIVE | Noted: 2018-11-27

## 2018-11-27 LAB
ALBUMIN SERPL-MCNC: 1.7 G/DL (ref 3.5–5)
ALBUMIN/GLOB SERPL: 0.3 {RATIO} (ref 1.2–3.5)
ALP SERPL-CCNC: 43 U/L (ref 50–136)
ALT SERPL-CCNC: 16 U/L (ref 12–65)
ANION GAP SERPL CALC-SCNC: 4 MMOL/L (ref 7–16)
AST SERPL-CCNC: 30 U/L (ref 15–37)
BACTERIA SPEC CULT: ABNORMAL
BACTERIA SPEC CULT: NORMAL
BILIRUB SERPL-MCNC: 0.3 MG/DL (ref 0.2–1.1)
BUN SERPL-MCNC: 8 MG/DL (ref 6–23)
CALCIUM SERPL-MCNC: 8.2 MG/DL (ref 8.3–10.4)
CHLORIDE SERPL-SCNC: 106 MMOL/L (ref 98–107)
CO2 SERPL-SCNC: 25 MMOL/L (ref 21–32)
CREAT SERPL-MCNC: 0.82 MG/DL (ref 0.8–1.5)
ERYTHROCYTE [DISTWIDTH] IN BLOOD BY AUTOMATED COUNT: 14 %
GLOBULIN SER CALC-MCNC: 5.7 G/DL (ref 2.3–3.5)
GLUCOSE SERPL-MCNC: 91 MG/DL (ref 65–100)
GRAM STN SPEC: NORMAL
GRAM STN SPEC: NORMAL
HAV IGM SERPL QL IA: NEGATIVE
HBV CORE IGM SERPL QL IA: NEGATIVE
HBV SURFACE AG SERPL QL IA: NEGATIVE
HCT VFR BLD AUTO: 23.3 % (ref 41.1–50.3)
HCV AB S/CO SERPL IA: >11 S/CO RATIO (ref 0–0.9)
HGB BLD-MCNC: 7.4 G/DL (ref 13.6–17.2)
HIV 1+2 AB+HIV1 P24 AG SERPL QL IA: NON REACTIVE
MAGNESIUM SERPL-MCNC: 2.2 MG/DL (ref 1.8–2.4)
MCH RBC QN AUTO: 26.6 PG (ref 26.1–32.9)
MCHC RBC AUTO-ENTMCNC: 31.8 G/DL (ref 31.4–35)
MCV RBC AUTO: 83.8 FL (ref 79.6–97.8)
NRBC # BLD: 0 K/UL (ref 0–0.2)
PLATELET # BLD AUTO: 781 K/UL (ref 150–450)
PMV BLD AUTO: 10 FL (ref 9.4–12.3)
POTASSIUM SERPL-SCNC: 4 MMOL/L (ref 3.5–5.1)
PROT SERPL-MCNC: 7.4 G/DL (ref 6.3–8.2)
RBC # BLD AUTO: 2.78 M/UL (ref 4.23–5.6)
SERVICE CMNT-IMP: ABNORMAL
SERVICE CMNT-IMP: NORMAL
SODIUM SERPL-SCNC: 135 MMOL/L (ref 136–145)
WBC # BLD AUTO: 8.3 K/UL (ref 4.3–11.1)

## 2018-11-27 PROCEDURE — 74011000258 HC RX REV CODE- 258: Performed by: NURSE PRACTITIONER

## 2018-11-27 PROCEDURE — 97110 THERAPEUTIC EXERCISES: CPT

## 2018-11-27 PROCEDURE — 74011250636 HC RX REV CODE- 250/636: Performed by: INTERNAL MEDICINE

## 2018-11-27 PROCEDURE — 65270000029 HC RM PRIVATE

## 2018-11-27 PROCEDURE — 97161 PT EVAL LOW COMPLEX 20 MIN: CPT

## 2018-11-27 PROCEDURE — 87641 MR-STAPH DNA AMP PROBE: CPT

## 2018-11-27 PROCEDURE — 36591 DRAW BLOOD OFF VENOUS DEVICE: CPT

## 2018-11-27 PROCEDURE — 74750000023 HC WOUND THERAPY

## 2018-11-27 PROCEDURE — 74011250636 HC RX REV CODE- 250/636: Performed by: NURSE PRACTITIONER

## 2018-11-27 PROCEDURE — 77030020263 HC SOL INJ SOD CL0.9% LFCR 1000ML

## 2018-11-27 PROCEDURE — 74011250637 HC RX REV CODE- 250/637: Performed by: SURGERY

## 2018-11-27 PROCEDURE — 74011250637 HC RX REV CODE- 250/637: Performed by: NURSE PRACTITIONER

## 2018-11-27 PROCEDURE — 85027 COMPLETE CBC AUTOMATED: CPT

## 2018-11-27 PROCEDURE — 80053 COMPREHEN METABOLIC PANEL: CPT

## 2018-11-27 PROCEDURE — 74011250637 HC RX REV CODE- 250/637: Performed by: INTERNAL MEDICINE

## 2018-11-27 PROCEDURE — 83735 ASSAY OF MAGNESIUM: CPT

## 2018-11-27 RX ORDER — TEMAZEPAM 15 MG/1
15 CAPSULE ORAL
Status: DISCONTINUED | OUTPATIENT
Start: 2018-11-27 | End: 2018-12-08 | Stop reason: HOSPADM

## 2018-11-27 RX ADMIN — SODIUM CHLORIDE 100 ML/HR: 900 INJECTION, SOLUTION INTRAVENOUS at 17:46

## 2018-11-27 RX ADMIN — Medication 10 ML: at 15:14

## 2018-11-27 RX ADMIN — HYDROMORPHONE HYDROCHLORIDE 1 MG: 1 INJECTION, SOLUTION INTRAMUSCULAR; INTRAVENOUS; SUBCUTANEOUS at 12:25

## 2018-11-27 RX ADMIN — PIPERACILLIN SODIUM,TAZOBACTAM SODIUM 3.38 G: 3; .375 INJECTION, POWDER, FOR SOLUTION INTRAVENOUS at 17:37

## 2018-11-27 RX ADMIN — OXYCODONE HYDROCHLORIDE AND ACETAMINOPHEN 1 TABLET: 7.5; 325 TABLET ORAL at 15:13

## 2018-11-27 RX ADMIN — OXYCODONE HYDROCHLORIDE AND ACETAMINOPHEN 1 TABLET: 7.5; 325 TABLET ORAL at 06:00

## 2018-11-27 RX ADMIN — OXYCODONE HYDROCHLORIDE AND ACETAMINOPHEN 1 TABLET: 7.5; 325 TABLET ORAL at 02:15

## 2018-11-27 RX ADMIN — HYDROMORPHONE HYDROCHLORIDE 1 MG: 1 INJECTION, SOLUTION INTRAMUSCULAR; INTRAVENOUS; SUBCUTANEOUS at 08:24

## 2018-11-27 RX ADMIN — SODIUM CHLORIDE 100 ML/HR: 900 INJECTION, SOLUTION INTRAVENOUS at 08:32

## 2018-11-27 RX ADMIN — HYDROMORPHONE HYDROCHLORIDE 1 MG: 1 INJECTION, SOLUTION INTRAMUSCULAR; INTRAVENOUS; SUBCUTANEOUS at 00:17

## 2018-11-27 RX ADMIN — HYDROMORPHONE HYDROCHLORIDE 1 MG: 1 INJECTION, SOLUTION INTRAMUSCULAR; INTRAVENOUS; SUBCUTANEOUS at 17:45

## 2018-11-27 RX ADMIN — VANCOMYCIN HYDROCHLORIDE 1000 MG: 1 INJECTION, POWDER, LYOPHILIZED, FOR SOLUTION INTRAVENOUS at 09:28

## 2018-11-27 RX ADMIN — HYDROMORPHONE HYDROCHLORIDE 1 MG: 1 INJECTION, SOLUTION INTRAMUSCULAR; INTRAVENOUS; SUBCUTANEOUS at 21:30

## 2018-11-27 RX ADMIN — PIPERACILLIN SODIUM,TAZOBACTAM SODIUM 3.38 G: 3; .375 INJECTION, POWDER, FOR SOLUTION INTRAVENOUS at 00:17

## 2018-11-27 RX ADMIN — PIPERACILLIN SODIUM,TAZOBACTAM SODIUM 3.38 G: 3; .375 INJECTION, POWDER, FOR SOLUTION INTRAVENOUS at 09:28

## 2018-11-27 RX ADMIN — Medication 10 ML: at 21:31

## 2018-11-27 RX ADMIN — HYDROMORPHONE HYDROCHLORIDE 1 MG: 1 INJECTION, SOLUTION INTRAMUSCULAR; INTRAVENOUS; SUBCUTANEOUS at 04:28

## 2018-11-27 RX ADMIN — OXYCODONE HYDROCHLORIDE AND ACETAMINOPHEN 1 TABLET: 7.5; 325 TABLET ORAL at 10:51

## 2018-11-27 RX ADMIN — VANCOMYCIN HYDROCHLORIDE 1000 MG: 1 INJECTION, POWDER, LYOPHILIZED, FOR SOLUTION INTRAVENOUS at 17:36

## 2018-11-27 RX ADMIN — TEMAZEPAM 15 MG: 15 CAPSULE ORAL at 22:34

## 2018-11-27 RX ADMIN — OXYCODONE HYDROCHLORIDE AND ACETAMINOPHEN 1 TABLET: 7.5; 325 TABLET ORAL at 19:42

## 2018-11-27 RX ADMIN — VANCOMYCIN HYDROCHLORIDE 1000 MG: 1 INJECTION, POWDER, LYOPHILIZED, FOR SOLUTION INTRAVENOUS at 02:07

## 2018-11-27 NOTE — PROGRESS NOTES
Hospitalist Progress Note   
2018 Admit Date: 2018 10:58 AM  
NAME: Jessie Toney :  1975 MRN:  [id number] Attending: Xenia Perez DO 
PCP:  None SUBJECTIVE:  
Jessie Toney is a 43 yo M with heroin abuse who was admitted with R thigh abscess. He is s/p surgical debridement on  and now with wound vac in place. He reports pain currently controlled but he is still requiring pain control. He is complaining of not being able to sleep well. Blood and wound cultures with NGTD. Review of Systems negative with exception of pertinent positives noted above PHYSICAL EXAM  
 
Visit Vitals /82 (BP 1 Location: Right arm, BP Patient Position: At rest) Pulse 73 Temp 98.1 °F (36.7 °C) Resp 18 Ht 6' (1.829 m) Wt 68 kg (150 lb) SpO2 100% BMI 20.34 kg/m² Temp (24hrs), Av.3 °F (36.8 °C), Min:97.4 °F (36.3 °C), Max:99 °F (37.2 °C) Patient Vitals for the past 24 hrs: 
 Temp Pulse Resp BP SpO2  
18 1622 98.1 °F (36.7 °C) 73 18 129/82 100 % 18 1142 98.6 °F (37 °C) 82 18 145/89 99 % 18 0740 98.5 °F (36.9 °C) 73 18 121/88 99 % 18 0400 98 °F (36.7 °C) 80 16 112/79 97 % 18 0006 99 °F (37.2 °C) 78 16 150/79 99 % 18 1943 97.4 °F (36.3 °C) 78 16 130/73 98 % Oxygen Therapy O2 Sat (%): 100 % (18 1622) Pulse via Oximetry: 82 beats per minute (18 1340) O2 Device: Room air (18 1307) Intake/Output Summary (Last 24 hours) at 2018 1724 Last data filed at 2018 1750 Gross per 24 hour Intake 1060 ml Output 1625 ml Net -565 ml General: No acute distress   
Lungs:  CTA Bilaterally. Heart:  Regular rate and rhythm,  No murmur, rub, or gallop Abdomen: Soft, Non distended, Non tender, Positive bowel sounds Extremities: No cyanosis, clubbing or edema, wound vac in place to R thigh Neurologic:  No focal deficits Recent Results (from the past 24 hour(s)) CBC W/O DIFF  
 Collection Time: 11/27/18  5:54 AM  
Result Value Ref Range WBC 8.3 4.3 - 11.1 K/uL  
 RBC 2.78 (L) 4.23 - 5.6 M/uL HGB 7.4 (L) 13.6 - 17.2 g/dL HCT 23.3 (L) 41.1 - 50.3 % MCV 83.8 79.6 - 97.8 FL  
 MCH 26.6 26.1 - 32.9 PG  
 MCHC 31.8 31.4 - 35.0 g/dL  
 RDW 14.0 % PLATELET 834 (H) 128 - 450 K/uL MPV 10.0 9.4 - 12.3 FL ABSOLUTE NRBC 0.00 0.0 - 0.2 K/uL METABOLIC PANEL, COMPREHENSIVE Collection Time: 11/27/18  5:54 AM  
Result Value Ref Range Sodium 135 (L) 136 - 145 mmol/L Potassium 4.0 3.5 - 5.1 mmol/L Chloride 106 98 - 107 mmol/L  
 CO2 25 21 - 32 mmol/L Anion gap 4 (L) 7 - 16 mmol/L Glucose 91 65 - 100 mg/dL BUN 8 6 - 23 MG/DL Creatinine 0.82 0.8 - 1.5 MG/DL  
 GFR est AA >60 >60 ml/min/1.73m2 GFR est non-AA >60 >60 ml/min/1.73m2 Calcium 8.2 (L) 8.3 - 10.4 MG/DL Bilirubin, total 0.3 0.2 - 1.1 MG/DL  
 ALT (SGPT) 16 12 - 65 U/L  
 AST (SGOT) 30 15 - 37 U/L Alk. phosphatase 43 (L) 50 - 136 U/L Protein, total 7.4 6.3 - 8.2 g/dL Albumin 1.7 (L) 3.5 - 5.0 g/dL Globulin 5.7 (H) 2.3 - 3.5 g/dL A-G Ratio 0.3 (L) 1.2 - 3.5 MAGNESIUM Collection Time: 11/27/18  5:54 AM  
Result Value Ref Range Magnesium 2.2 1.8 - 2.4 mg/dL MSSA/MRSA SC BY PCR, NASAL SWAB Collection Time: 11/27/18 12:22 PM  
Result Value Ref Range Special Requests: NO SPECIAL REQUESTS Culture result: (A) MRSA target DNA not detected, SA target DNA detected. A MRSA negative, SA positive test result does not preclude MRSA nasal colonization. All Micro Results Procedure Component Value Units Date/Time MSSA/MRSA SC BY PCR, NASAL SWAB [[id number]]  (Abnormal) Collected:  11/27/18 1222 Order Status:  Completed Specimen:  Nasal Updated:  11/27/18 6481 Special Requests: NO SPECIAL REQUESTS Culture result: MRSA target DNA not detected, SA target DNA detected.    A MRSA negative, SA positive test result does not preclude MRSA nasal colonization. Kimberly Peto STAIN [[id number]] Collected:  11/25/18 1220 Order Status:  Completed Specimen:  Wound from Abscess Updated:  11/27/18 2232 Special Requests: --     
  RIGHT THIGH 
  
  GRAM STAIN NO DEFINITE ORGANISM SEEN     
   20 TO 30 WBC'S PER OIF Culture result: NO GROWTH 2 DAYS     
 CULTURE, BLOOD [[id number]] Collected:  11/24/18 1351 Order Status:  Completed Specimen:  Whole Blood Updated:  11/27/18 0730 Special Requests: --     
  RIGHT JUGULAR VEIN Culture result: NO GROWTH 3 DAYS     
 CULTURE, BLOOD [[id number]] Collected:  11/24/18 1400 Order Status:  Completed Specimen:  Blood Updated:  11/27/18 0730 Special Requests: NO SPECIAL REQUESTS Culture result: NO GROWTH 3 DAYS     
 CULTURE, ANAEROBIC [[id number]] Collected:  11/25/18 1220 Order Status:  Completed Specimen:  Abscess Updated:  11/26/18 1454 Special Requests: --     
  RIGHT THIGH Culture result:    
  NO ANAEROBIC GROWTH IN 1 DAY Imaging:   
CT HIP RT W CONT Final Result IMPRESSION:    
1. Abscess collections within the right lateral hip and proximal thigh soft  
tissues as described above. ASSESSMENT Hospital Problems as of 11/27/2018 Never Reviewed Codes Class Noted - Resolved POA * (Principal) Necrotizing fasciitis ([facility] ICD-10-CM: M72.6 ICD-9-CM: 728.86  11/26/2018 - Present Unknown Abscess of thigh ICD-10-CM: L02.419 ICD-9-CM: 682.6  11/24/2018 - Present Unknown Plan: · R thigh abscess- s/p excisional debridement of R thigh on 11/25. · Continue vanc and zosyn per ID recs (likely 2 weeks, then PO abx). · Wound vac. · Pain control. · Insomnia- start prn Restoril. DVT Prophylaxis: SCDs Signed By: Juli Marx MD   
 November 27, 2018

## 2018-11-27 NOTE — PROGRESS NOTES
Pharmacokinetic Consult to Pharmacist 
 
Alexandru Mercado is a 44 y.o. male being treated for SSTI with Vancomycin and zosyn. Height: 6' (182.9 cm)  Weight: 68 kg (150 lb) Lab Results Component Value Date/Time BUN 8 11/27/2018 05:54 AM  
 Creatinine 0.82 11/27/2018 05:54 AM  
 WBC 8.3 11/27/2018 05:54 AM  
 Procalcitonin 0.1 11/24/2018 10:59 AM  
 Lactic Acid (POC) 0.62 11/24/2018 01:57 PM  
  
Estimated Creatinine Clearance: 112.9 mL/min (based on SCr of 0.82 mg/dL). Lab Results Component Value Date/Time Vancomycin,trough 12.7 11/26/2018 04:48 PM  
 
 
Day 4 of vancomycin. Goal trough is 10-20. ID anticipating long duration of treatment. Vancomycin trough resulted last night within goal at 12.7. Will keep dose the same at 1000 mg Q8H for now. If renal function remains stable, next trough in 2-3 days. Will continue to follow patient. Thank you, 
Marlyn Meraz, PharmD Clinical Pharmacist 
[phone]

## 2018-11-27 NOTE — PROGRESS NOTES
Problem: Mobility Impaired (Adult and Pediatric) Goal: *Acute Goals and Plan of Care (Insert Text) STG: 
(1.)Mr. Pranay Montoya will move from supine to sit and sit to supine  with CONTACT GUARD ASSIST within 3 treatment day(s). (2.)Mr. Pranay Montoya will transfer from bed to chair and chair to bed with CONTACT GUARD ASSIST using the least restrictive device within 3 treatment day(s). (3.)Mr. Pranay Montoya will ambulate with CONTACT GUARD ASSIST for 30 feet with the least restrictive device within 3 treatment day(s). LTG: 
(1.)Mr. Pranay Montoya will move from supine to sit and sit to supine  in bed with INDEPENDENT within 7 treatment day(s). (2.)Mr. Pranay Montoya will transfer from bed to chair and chair to bed with MODIFIED INDEPENDENT using the least restrictive device within 7 treatment day(s). (3.)Mr. Pranay Montoya will ambulate with STAND BY ASSIST for 250+ feet with the least restrictive device within 7 treatment day(s). ________________________________________________________________________________________________ PHYSICAL THERAPY: Initial Assessment, Treatment Day: Day of Assessment, PM 11/27/2018INPATIENT: Hospital Day: 4 Payor: SELF PAY / Plan: [facility] SELF PAY / Product Type: Self Pay /  
  
NAME/AGE/GENDER: Mk Aragon is a 44 y.o. male PRIMARY DIAGNOSIS: Abscess of thigh Necrotizing fasciitis ([address] Necrotizing fasciitis ([address] Procedure(s) (LRB): 
I&D OF LEFT THIGH ABSCESS (Right) 2 Days Post-Op ICD-10: Treatment Diagnosis:  
 · Generalized Muscle Weakness (M62.81) · Difficulty in walking, Not elsewhere classified (R26.2) · History of falling (Z91.81) Precaution/Allergies: 
Patient has no known allergies. ASSESSMENT:  
Mr. Pranay Montoya is supine in bed upon contact and agreeable to PT evaluation and treatment this afternoon. Pt with reports of 7/10 R thigh pain and is 2 days post op I&D of R thigh abscess with wound vac.  Pt lives with his wife in 1 story home with 2-3 steps to enter. Pt is typically independent with gait and ADLs and reports 1 fall in past 6 months. Pt transitioned supine to sit EOB with Hunter with PT providing support of R LE for pain management. Pt able to scoot towards EOB with Hunter. Pt unable to tolerate sitting EOB for any amount of time this session due to increased pain with attempting to place R LE into knee flexed position EOB. Pt quickly returning to supine with PT providing support to R LE. Pt requesting to attempt OOB tomorrow but agreeable to exercises in bed. Pt performed exercises in bed with VC and TC for technique. Pt with very limited non-functional AROM R knee flexion only tolerating approximately 30-40 degrees before increased mm guarding noted. Pt left supine in bed with all needs met and within reach. Edwena Hipps will benefit from skilled PT (medically necessary) to address decreased strength, decreased balance, decreased functional tolerance, decreased cardiopulmonary endurance affecting participation in basic ADLs and functional tasks. This section established at most recent assessment PROBLEM LIST (Impairments causing functional limitations): 1. Decreased Strength 2. Decreased ADL/Functional Activities 3. Decreased Transfer Abilities 4. Decreased Ambulation Ability/Technique 5. Decreased Balance 6. Increased Pain 7. Decreased Activity Tolerance 8. Increased Fatigue 9. Decreased Flexibility/Joint Mobility 10. Decreased [place] with Home Exercise Program 
 INTERVENTIONS PLANNED: (Benefits and precautions of physical therapy have been discussed with the patient.) 1. Balance Exercise 2. Bed Mobility 3. Family Education 4. Gait Training 5. Home Exercise Program (HEP) 6. Neuromuscular Re-education/Strengthening 7. Range of Motion (ROM) 8. Therapeutic Activites 9. Therapeutic Exercise/Strengthening 10. Transfer Training TREATMENT PLAN: Frequency/Duration: 3 times a week for duration of hospital stay Rehabilitation Potential For Stated Goals: Good RECOMMENDED REHABILITATION/EQUIPMENT: (at time of discharge pending progress): Due to the probability of continued deficits (see above) this patient will likely need continued skilled physical therapy after discharge. Equipment:  
? None at this time HISTORY:  
History of Present Injury/Illness (Reason for Referral): 
See H&P below Dang Drummond is a 44 y.o. male with a history of GERD and heroin abuse presents with pain in his right hip region for the past few weeks. [facility] admits to falling 4 days prior to the development of pain.  He reports subjective fevers.  He states now it is very difficult to walk due to pain.  Denies similar symptoms in the past.  No radiation of pain.  Patient is quite a poor historian.  Reviewed chart and noted that he had been seen in the emergency department November 5, 12th, and 18th.  November 5th, he was seen for fever, Nov 12 for abdominal pain and heroin use, November 18 for the right hip pain.   At that visit he was found to have significant leukocytosis and after further search was found to have a large left arm abscess that was drained.  He was prescribed clindamycin.  Patient states he took all of this antibiotic, but interestingly, it was only prescribed 6 days ago. Vinod Dennis admits that he \"overtook it. \" [facility] also never returned for packing removal or dressing change. [facility] states he has been too focused on the pain in his right hip. No prior hip surgeries. He states last heroin use was 4 days ago and snorts it. [facility] denies any IV drug use. Past Medical History/Comorbidities:  
Mr. Sathya Hughes  has a past medical history of GERD (gastroesophageal reflux disease) and Necrotizing fasciitis ([facility] Mr. Sathya Hughes  has a past surgical history that includes hx other surgical and I&D OF LEFT THIGH ABSCESS (Right, 11/25/2018). Social History/Living Environment:  
Home Environment: Private residence # Steps to Enter: 3 One/Two Story Residence: One story Living Alone: No 
Support Systems: Spouse/Significant Other/Partner Patient Expects to be Discharged to[de-identified] Private residence Current DME Used/Available at Home: None Prior Level of Function/Work/Activity: 
Independent with all mobility at baseline, 1 fall Number of Personal Factors/Comorbidities that affect the Plan of Care: 1-2: MODERATE COMPLEXITY EXAMINATION:  
Most Recent Physical Functioning:  
Gross Assessment: 
AROM: Grossly decreased, non-functional(limited by pain, R LE) PROM: Grossly decreased, non-functional(limited by pain, R LE) Strength: Generally decreased, functional 
Coordination: Generally decreased, functional 
         
  
Posture: 
  
Balance: 
Sitting: Intact; With support Bed Mobility: 
Supine to Sit: Minimum assistance Scooting: Minimum assistance Wheelchair Mobility: 
  
Transfers: 
  
Gait: 
  
   
  
Body Structures Involved: 1. Nerves 2. Heart 3. Lungs 4. Bones 5. Joints 6. Muscles 7. Ligaments Body Functions Affected: 1. Sensory/Pain 2. Cardio 3. Respiratory 4. Neuromusculoskeletal 
5. Movement Related 6. Skin Related Activities and Participation Affected: 1. General Tasks and Demands 2. Mobility 3. Self Care 4. Domestic Life 5. Interpersonal Interactions and Relationships 6. Community, Social and [place] Number of elements that affect the Plan of Care: 4+: HIGH COMPLEXITY CLINICAL PRESENTATION:  
Presentation: Evolving clinical presentation with changing clinical characteristics: MODERATE COMPLEXITY CLINICAL DECISION MAKIN [facility] [address] [address] AM-PAC 6 Clicks Basic Mobility Inpatient Short Form How much difficulty does the patient currently have. .. Unable A Lot A Little None 1. Turning over in bed (including adjusting bedclothes, sheets and blankets)?    [] 1   [] 2   [x] 3   [] 4  
 2.  Sitting down on and standing up from a chair with arms ( e.g., wheelchair, bedside commode, etc.)   [] 1   [x] 2   [] 3   [] 4  
3. Moving from lying on back to sitting on the side of the bed? [] 1   [] 2   [x] 3   [] 4 How much help from another person does the patient currently need. .. Total A Lot A Little None 4. Moving to and from a bed to a chair (including a wheelchair)? [] 1   [x] 2   [] 3   [] 4  
5. Need to walk in hospital room? [] 1   [x] 2   [] 3   [] 4  
6. Climbing 3-5 steps with a railing? [] 1   [x] 2   [] 3   [] 4  
© 2007, Trustees of [address] [place] [address] [address] [address], under license to [employer]. All rights reserved Score:  Initial: 14 Most Recent: X (Date: -- ) Interpretation of Tool:  Represents activities that are increasingly more difficult (i.e. Bed mobility, Transfers, Gait). Score 24 23 22-20 19-15 14-10 9-7 6 Modifier CH CI CJ CK CL CM CN   
 
? Mobility - Walking and Moving Around:  
  - CURRENT STATUS: CL - 60%-79% impaired, limited or restricted  - GOAL STATUS: CK - 40%-59% impaired, limited or restricted  - D/C STATUS:  ---------------To be determined--------------- Payor: SELF PAY / Plan: [facility] SELF PAY / Product Type: Self Pay /   
 
Medical Necessity:    
· Patient is expected to demonstrate progress in strength, range of motion, balance, coordination and functional technique to decrease assistance required with gait, transfers, and functional mobility. Kamlesh Kat Reason for Services/Other Comments: 
· Patient continues to require skilled intervention due to decreased strength, decreased balance, decreased functional tolerance, decreased cardiopulmonary endurance affecting participation in basic ADLs and functional tasks.   
Use of outcome tool(s) and clinical judgement create a POC that gives a: Clear prediction of patient's progress: LOW COMPLEXITY  
  
 
 
 
TREATMENT:  
(In addition to Assessment/Re-Assessment sessions the following treatments were rendered) Pre-treatment Symptoms/Complaints:  R thigh pain 
Pain: Initial:  
Pain Intensity 1: 7  Post Session:  Increased with mobility 8-9/10 In addition to evaluation: 
Therapeutic Exercise: (10 Minutes):  Exercises per grid below to improve mobility and strength. Required minimal visual, verbal, manual and tactile cues to promote proper body alignment, promote proper body posture and promote proper body mechanics. Progressed range, repetitions and complexity of movement as indicated. Date: 
11/27/18 Date: 
 Date: Activity/Exercise Parameters Parameters Parameters Ankle pumps 15 X B Quad set 15 X B Glute set 15 X Heel slides 10 X B AA R Hip abduction 10 X B AA R Braces/Orthotics/Lines/Etc:  
· IV 
· drain wound vac · O2 Device: Room air Treatment/Session Assessment:   
· Response to Treatment:  Chaz for bed mobility, unable to tolerate knee flexion in sitting position · Interdisciplinary Collaboration:  
o Physical Therapist 
o Registered Nurse · After treatment position/precautions:  
o Supine in bed 
o Bed/Chair-wheels locked 
o Bed in low position 
o Call light within reach · Compliance with Program/Exercises: Will assess as treatment progresses · Recommendations/Intent for next treatment session: \"Next visit will focus on advancements to more challenging activities and reduction in assistance provided\". Total Treatment Duration: PT Patient Time In/Time Out Time In: 3214 Time Out: [address]

## 2018-11-27 NOTE — PROGRESS NOTES
[address] [facility] November 27, 2018 RE: Shira Diaz To Whom It May Concern, This is to certify that Shira Diaz has been admitted to the [facility] since 11/24/2018. Expected length of stay is uncertain at this time but likely 2 to 4 weeks. Please feel free to contact my office if you have any questions or concerns. Thank you for your assistance in this matter. Sincerely, Tammy Christian MD 
[facility] [address] [phone] Paula Dasilva RN [address]

## 2018-11-27 NOTE — PROGRESS NOTES
PLAN: 
Care management per hospitalist  
S/p I&D of very large abscess; close to hip joint Continue ABX per ID recommendations (Vanc/Zosyn)-anticipating 5-7 days of IV ABX Continue wound team for wound vac Will arrange wound center follow up at D/C Start process for [facility] No further surgical needs ASSESSMENT:  Admit Date: 11/24/2018 2 Day Post-Op  Right thigh incision and drainage Principal Problem: 
  Necrotizing fasciitis ([address] (11/26/2018) Active Problems: Abscess of thigh (11/24/2018) HPI:Patient is a 44 y.o. male  history of GERD and heroin abuse presents with pain in his right hip region for the past few weeks. [facility] admits to falling 4 days prior to the development of pain.  He reports subjective fevers.  He states now it is very difficult to walk due to pain.  Denies similar symptoms in the past.  No radiation of pain.  Patient is quite a poor historian.  Reviewed chart and noted that he had been seen in the emergency department November 5, 12th, and 18th.  November 5th, he was seen for fever, Nov 12 for abdominal pain and heroin use, November 18 for the right hip pain.   At that visit he was found to have significant leukocytosis and after further search was found to have a large left arm abscess that was drained.  He was prescribed PO Clindamycin.  Patient states he took all of this antibiotic, but interestingly, it was only prescribed 6 days ago. Delroy Mohan admits that he \"overtook it. \" [facility] also never returned for packing removal or dressing change. [facility] states he has been too focused on the pain in his right hip. No prior hip surgeries. He states last heroin use was 4 days ago and snorts it. [facility] denies any IV drug use. Blood cultures NGTD X 2.  S/P I&D today. Patient was started on IV Vancomycin and Zosyn 11/24/18. ID is asked to evaluate patient for abx recommendations. SUBJECTIVE: 
Awake in bed, reports improved pain. S/p right thigh I&D.  WBC normal. Wound cx with no definite organism identified at this point. Wound vac placed yesterday without difficulty; functioning well. Intake/Output Summary (Last 24 hours) at 11/27/2018 9231 Last data filed at 11/27/2018 9391 Gross per 24 hour Intake 1220 ml Output 1925 ml Net -705 ml OBJECTIVE: 
Constitutional: Alert oriented cooperative patient in no acute distress; appears stated age Visit Vitals /79 Pulse 80 Temp 98 °F (36.7 °C) Resp 16 Ht 6' (1.829 m) Wt 150 lb (68 kg) SpO2 97% BMI 20.34 kg/m² Eyes:Sclera are clear. ENMT: no external lesions gross hearing normal; no obvious neck masses, no ear or lip lesions CV: RRR. Normal perfusion Resp: No JVD. Breathing is  non-labored; no audible wheezing. GI: soft and non-distended Skin: right lateral thigh with wound vac in place Musculoskeletal: unremarkable with normal function. No embolic signs or cyanosis. Neuro:  Oriented; moves all 4; no focal deficits Psychiatric: normal affect and mood, no memory impairment Patient Vitals for the past 24 hrs: 
 BP Temp Pulse Resp SpO2  
11/27/18 0400 112/79 98 °F (36.7 °C) 80 16 97 % 11/27/18 0006 150/79 99 °F (37.2 °C) 78 16 99 % 11/26/18 1943 130/73 97.4 °F (36.3 °C) 78 16 98 % 11/26/18 1422 146/76 98.8 °F (37.1 °C) 85 16 100 % 11/26/18 1143 128/82 99.4 °F (37.4 °C) 78 16 99 % Labs:   
Recent Labs  
  11/27/18 
0554 WBC 8.3 HGB 7.4* * * K 4.0  
 CO2 25 BUN 8  
CREA 0.82 GLU 91 TBILI 0.3 SGOT 30 ALT 16  
AP 43* Signed:  Antonio Jimenez NP

## 2018-11-27 NOTE — PROGRESS NOTES
Infectious Disease Progress Note Today's Date: 2018 Admit Date: 2018 Impression: · Necrotizing fasciitis right thigh s/p I&D 18; operative cx  so far without growth. Blood cx  negative. Blood cx  no growth to date. · Debrided area close to hip joint. CT as below. Surgery exploring wound vac options. · Heroin abuse; \"skin pops\": states had injected right hip/thigh area multiple times. · Tobacco abuse · Medical non-adherence Plan:  
· Continue current antibiotics and follow cultures. · Check MRSA/MSSA nares for colonization status. · Plan to treat for 4 weeks total with combination of oral and IV abx. Anti-infectives:  
Vanc (- Zosyn (- Subjective:  
 
Doing well. Wife in room, discussed ID plans. Denies nausea, vomiting, diarrhea, fever, chills, or sweats Review of Systems:  A comprehensive review of systems was negative except for that written in the History of Present Illness. Objective:  
 
Visit Vitals /79 Pulse 80 Temp 98 °F (36.7 °C) Resp 16 Ht 6' (1.829 m) Wt 68 kg (150 lb) SpO2 97% BMI 20.34 kg/m² Temp (24hrs), Av.5 °F (36.9 °C), Min:97.4 °F (36.3 °C), Max:99.4 °F (37.4 °C) Lines:R IJ CVC Physical Exam: Exam:   
 General: NC/AT, alert and cooperative, looks stated age, in NAD HEENT: PERRL, non-icteric sclera, edentulous Neck: supple, symmetric, no masses or lymphadenopathy Cardiac:Nl S1/S2, no murmurs/rubs/gallops/clicks, no LE edema Pulmonary:clear bilaterally without rales or wheezes, good air movement Abdomen: soft, non-tender, non-distended, BS normal 
 Gentital:external genitalia normal Gonzales : none Skin:no rashes, lesions or wounds MSK:no enlarged or swollen joints in limbs or sternoclavicular area Extremities: no cords/clots/cyanosis, pulses palpable and symmetric Right thigh area remains somewhat swollen with VAC in place Psychiatric: alert and oriented x 3, mood and affect appropriate to situation Data Review: CBC:  
Recent Labs  
  11/27/18 
0554 11/25/18 
0617 11/24/18 
1059 WBC 8.3 15.7* 20.8*  
RBC 2.78* 3.01* 3.15* HGB 7.4* 8.0* 8.3* HCT 23.3* 25.0* 26.8*  
* 666* 740* GRANS  --   --  84* LYMPH  --   --  11* EOS  --   --  0* CMP:  
Recent Labs  
  11/27/18 
0554 11/25/18 
0617 11/24/18 
1059 GLU 91 103* 102* * 134* 134* K 4.0 4.3 4.0  
 100 101 CO2 25 25 26 BUN 8 13 17 CREA 0.82 0.85 1.09  
CA 8.2* 8.3 8.7 AGAP 4* 9 7  
AP 43*  --  64  
TP 7.4  --  9.1* ALB 1.7*  --  2.3*  
GLOB 5.7*  --  6.8* AGRAT 0.3*  --  0.3* Liver Enzymes:  
Recent Labs  
  11/27/18 
0554 TP 7.4 ALB 1.7* AP 43* SGOT 30 Microbiology:  
 
All Micro Results Procedure Component Value Units Date/Time April Carwin STAIN [[id number]] Collected:  11/25/18 1220 Order Status:  Completed Specimen:  Wound from Abscess Updated:  11/27/18 2480 Special Requests: --     
  RIGHT THIGH 
  
  GRAM STAIN NO DEFINITE ORGANISM SEEN     
   20 TO 30 WBC'S PER OIF Culture result: NO GROWTH 2 DAYS     
 CULTURE, BLOOD [[id number]] Collected:  11/24/18 1351 Order Status:  Completed Specimen:  Whole Blood Updated:  11/27/18 0730 Special Requests: --     
  RIGHT JUGULAR VEIN Culture result: NO GROWTH 3 DAYS     
 CULTURE, BLOOD [[id number]] Collected:  11/24/18 1400 Order Status:  Completed Specimen:  Blood Updated:  11/27/18 0730 Special Requests: NO SPECIAL REQUESTS Culture result: NO GROWTH 3 DAYS     
 CULTURE, ANAEROBIC [[id number]] Collected:  11/25/18 1220 Order Status:  Completed Specimen:  Abscess Updated:  11/26/18 1090 Special Requests: --     
  RIGHT THIGH Culture result:    
  NO ANAEROBIC GROWTH IN 1 DAY Imaging:  
 
CT right hip with contrast 11/24/2018 
  
 CLINICAL HISTORY: Right hip pain and swelling to right hip and buttocks that is 
warm and painful to touch for one week. 
  
TECHNIQUE: Multiple contiguous helical CT images were obtained to the right hip 
with images reconstructed in the axial plane at 2.5 mm intervals following the 
uneventful intravenous administration of 100 mL Isovue-370. 2-D sagittal, and 
coronal reconstructed images were made and reviewed. All CT scans performed at 
this facility use one or all of the following: Automated exposure control, 
adjustment of the mA and/or kVp according to patient's size, iterative 
reconstruction.  
  
Findings:   
Abnormal enhancing fluid collections are seen within the right lateral hip, and 
right proximal thigh soft tissues is appearance is concerning for multiple 
abscess collections within the soft tissues. These begin in the lateral hip soft 
tissues just inferior to the level of the right iliac crest. The subsequently 
extended into the proximal right thigh soft tissues with some involvement felt 
to be seen in all compartments of the proximal right thigh although most 
prominently involving the posterior compartment. The largest distinct collection 
appears to bridge the anterior and posterior compartments of the proximal right 
thigh seen on axial image 68 measuring 11.4 cm x 4.5 cm in greatest transverse 
dimensions. Note is made that some persistent abscess is seen on the most 
inferior image seen in the lateral compartment. Therefore, the distal extent of 
these changes is uncertain. Early osteomyelitis can be missed by CT imaging. However, no clear bony destructive changes are seen to strongly suggest 
osteomyelitis. No abnormal soft tissue gas is seen. Likely reactive prominent 
right groin lymph nodes are seen. 
  
IMPRESSION IMPRESSION:   
1. Abscess collections within the right lateral hip and proximal thigh soft 
tissues as described above.   
Signed By: Giorgi Vega NP   
 November 27, 2018

## 2018-11-28 PROBLEM — F19.10 POLYSUBSTANCE ABUSE (HCC): Status: ACTIVE | Noted: 2018-11-28

## 2018-11-28 LAB
ANION GAP SERPL CALC-SCNC: 8 MMOL/L (ref 7–16)
BUN SERPL-MCNC: 9 MG/DL (ref 6–23)
CALCIUM SERPL-MCNC: 8.3 MG/DL (ref 8.3–10.4)
CHLORIDE SERPL-SCNC: 106 MMOL/L (ref 98–107)
CO2 SERPL-SCNC: 25 MMOL/L (ref 21–32)
CREAT SERPL-MCNC: 0.83 MG/DL (ref 0.8–1.5)
GLUCOSE SERPL-MCNC: 83 MG/DL (ref 65–100)
POTASSIUM SERPL-SCNC: 3.5 MMOL/L (ref 3.5–5.1)
SODIUM SERPL-SCNC: 139 MMOL/L (ref 136–145)

## 2018-11-28 PROCEDURE — 74011250636 HC RX REV CODE- 250/636: Performed by: INTERNAL MEDICINE

## 2018-11-28 PROCEDURE — 74011250636 HC RX REV CODE- 250/636: Performed by: NURSE PRACTITIONER

## 2018-11-28 PROCEDURE — 97606 NEG PRS WND THER DME>50 SQCM: CPT

## 2018-11-28 PROCEDURE — 65270000029 HC RM PRIVATE

## 2018-11-28 PROCEDURE — 74750000023 HC WOUND THERAPY

## 2018-11-28 PROCEDURE — 77030019934 HC DRSG VAC ASST KCON -B

## 2018-11-28 PROCEDURE — 74011250637 HC RX REV CODE- 250/637: Performed by: SURGERY

## 2018-11-28 PROCEDURE — 74011000258 HC RX REV CODE- 258: Performed by: NURSE PRACTITIONER

## 2018-11-28 PROCEDURE — 77030020263 HC SOL INJ SOD CL0.9% LFCR 1000ML

## 2018-11-28 PROCEDURE — 80048 BASIC METABOLIC PNL TOTAL CA: CPT

## 2018-11-28 PROCEDURE — 74011250637 HC RX REV CODE- 250/637: Performed by: NURSE PRACTITIONER

## 2018-11-28 PROCEDURE — 36591 DRAW BLOOD OFF VENOUS DEVICE: CPT

## 2018-11-28 RX ADMIN — Medication 10 ML: at 14:16

## 2018-11-28 RX ADMIN — HYDROMORPHONE HYDROCHLORIDE 1 MG: 1 INJECTION, SOLUTION INTRAMUSCULAR; INTRAVENOUS; SUBCUTANEOUS at 20:15

## 2018-11-28 RX ADMIN — HYDROMORPHONE HYDROCHLORIDE 1 MG: 1 INJECTION, SOLUTION INTRAMUSCULAR; INTRAVENOUS; SUBCUTANEOUS at 06:14

## 2018-11-28 RX ADMIN — SODIUM CHLORIDE 100 ML/HR: 900 INJECTION, SOLUTION INTRAVENOUS at 14:16

## 2018-11-28 RX ADMIN — HYDROMORPHONE HYDROCHLORIDE 1 MG: 1 INJECTION, SOLUTION INTRAMUSCULAR; INTRAVENOUS; SUBCUTANEOUS at 15:14

## 2018-11-28 RX ADMIN — PIPERACILLIN SODIUM,TAZOBACTAM SODIUM 3.38 G: 3; .375 INJECTION, POWDER, FOR SOLUTION INTRAVENOUS at 09:53

## 2018-11-28 RX ADMIN — Medication 10 ML: at 21:07

## 2018-11-28 RX ADMIN — HYDROMORPHONE HYDROCHLORIDE 1 MG: 1 INJECTION, SOLUTION INTRAMUSCULAR; INTRAVENOUS; SUBCUTANEOUS at 09:55

## 2018-11-28 RX ADMIN — OXYCODONE HYDROCHLORIDE AND ACETAMINOPHEN 1 TABLET: 7.5; 325 TABLET ORAL at 12:14

## 2018-11-28 RX ADMIN — OXYCODONE HYDROCHLORIDE AND ACETAMINOPHEN 1 TABLET: 7.5; 325 TABLET ORAL at 17:38

## 2018-11-28 RX ADMIN — PIPERACILLIN SODIUM,TAZOBACTAM SODIUM 3.38 G: 3; .375 INJECTION, POWDER, FOR SOLUTION INTRAVENOUS at 17:34

## 2018-11-28 RX ADMIN — SODIUM CHLORIDE 100 ML/HR: 900 INJECTION, SOLUTION INTRAVENOUS at 03:56

## 2018-11-28 RX ADMIN — PIPERACILLIN SODIUM,TAZOBACTAM SODIUM 3.38 G: 3; .375 INJECTION, POWDER, FOR SOLUTION INTRAVENOUS at 01:57

## 2018-11-28 RX ADMIN — Medication 10 ML: at 05:17

## 2018-11-28 RX ADMIN — HYDROMORPHONE HYDROCHLORIDE 1 MG: 1 INJECTION, SOLUTION INTRAMUSCULAR; INTRAVENOUS; SUBCUTANEOUS at 02:11

## 2018-11-28 RX ADMIN — OXYCODONE HYDROCHLORIDE AND ACETAMINOPHEN 1 TABLET: 7.5; 325 TABLET ORAL at 08:01

## 2018-11-28 RX ADMIN — VANCOMYCIN HYDROCHLORIDE 1000 MG: 1 INJECTION, POWDER, LYOPHILIZED, FOR SOLUTION INTRAVENOUS at 01:57

## 2018-11-28 NOTE — WOUND CARE
Wound VAC dressing change to right thigh wound, wound base pale, pink, serosanguinous drainage, no odor, surrounding skin edematous however intact. 2 pieces of foam used. Will monitor.

## 2018-11-28 NOTE — PROGRESS NOTES
Infectious Disease Progress Note Today's Date: 2018 Admit Date: 2018 Impression: · Necrotizing fasciitis right thigh s/p I&D 18; operative cx  so far without growth. Blood cx  negative. Blood cx  no growth to date. · Debrided area close to hip joint. CT as below. Surgery exploring wound vac options. · Heroin abuse; \"skin pops\": states had injected right hip/thigh area multiple times. · Tobacco abuse · Medical non-adherence Plan: · MSSA nares colonization is noted. History of strep intermedius of shoulder area in 2017 and an alpha hemolytic strep in January from unclear site. · Will stop Vancomycin and follow on Zosyn alone. · Plan to treat for 4 weeks total with combination of oral and IV abx. Possible switch to oral route if site heals well. Anti-infectives:  
Vanc (- Zosyn (- Subjective: No complaints; no diarrhea. Review of Systems:  A comprehensive review of systems was negative except for that written in the History of Present Illness. Objective:  
 
Visit Vitals /81 Pulse 83 Temp 98.4 °F (36.9 °C) Resp 18 Ht 6' (1.829 m) Wt 68 kg (150 lb) SpO2 99% BMI 20.34 kg/m² Temp (24hrs), Av.4 °F (36.9 °C), Min:98.1 °F (36.7 °C), Max:98.7 °F (37.1 °C) Lines:R IJ CVC, wound vac right thigh Physical Exam: Exam:   
 General: NC/AT, alert and cooperative, looks stated age, in NAD HEENT: PERRL, non-icteric sclera, edentulous Neck: supple, symmetric, no masses or lymphadenopathy Cardiac:Nl S1/S2, no murmurs/rubs/gallops/clicks, no LE edema Pulmonary:clear bilaterally without rales or wheezes, good air movement Abdomen: soft, non-tender, non-distended, BS normal 
 Gentital:external genitalia normal [place] : none Skin:no rashes, lesions or wounds MSK:no enlarged or swollen joints in limbs or sternoclavicular area Extremities: no cords/clots/cyanosis, pulses palpable and symmetric Right thigh area remains somewhat swollen with VAC in place Psychiatric: alert and oriented x 3, mood and affect appropriate to situation Data Review: CBC:  
Recent Labs  
  11/27/18 
0554 WBC 8.3  
RBC 2.78* HGB 7.4* HCT 23.3*  
* CMP:  
Recent Labs  
  11/28/18 
0433 11/27/18 
0554 GLU 83 91  135* K 3.5 4.0  
 106 CO2 25 25 BUN 9 8  
CREA 0.83 0.82 CA 8.3 8.2* AGAP 8 4* AP  --  43* TP  --  7.4 ALB  --  1.7*  
GLOB  --  5.7* AGRAT  --  0.3* Liver Enzymes:  
Recent Labs  
  11/27/18 
0554 TP 7.4 ALB 1.7* AP 43* SGOT 30 Microbiology:  
 
All Micro Results Procedure Component Value Units Date/Time MSSA/MRSA SC BY PCR, NASAL SWAB [[id number]]  (Abnormal) Collected:  11/27/18 1222 Order Status:  Completed Specimen:  Nasal Updated:  11/27/18 1428 Special Requests: NO SPECIAL REQUESTS Culture result: MRSA target DNA not detected, SA target DNA detected. A MRSA negative, SA positive test result does not preclude MRSA nasal colonization. Marck Juarez STAIN [[id number]] Collected:  11/25/18 1220 Order Status:  Completed Specimen:  Wound from Abscess Updated:  11/27/18 9337 Special Requests: --     
  RIGHT THIGH 
  
  GRAM STAIN NO DEFINITE ORGANISM SEEN     
   20 TO 30 WBC'S PER OIF Culture result: NO GROWTH 2 DAYS     
 CULTURE, BLOOD [[id number]] Collected:  11/24/18 1351 Order Status:  Completed Specimen:  Whole Blood Updated:  11/27/18 0730 Special Requests: --     
  RIGHT JUGULAR VEIN Culture result: NO GROWTH 3 DAYS     
 CULTURE, BLOOD [[id number]] Collected:  11/24/18 1400 Order Status:  Completed Specimen:  Blood Updated:  11/27/18 0730 Special Requests: NO SPECIAL REQUESTS Culture result: NO GROWTH 3 DAYS     
 CULTURE, ANAEROBIC [[id number]] Collected:  11/25/18 1220 Order Status:  Completed Specimen:  Abscess Updated:  11/26/18 0031 Special Requests: --     
  RIGHT THIGH Culture result:    
  NO ANAEROBIC GROWTH IN 1 DAY Imaging:  
 
CT right hip with contrast 11/24/2018 
  
CLINICAL HISTORY: Right hip pain and swelling to right hip and buttocks that is 
warm and painful to touch for one week. 
  
TECHNIQUE: Multiple contiguous helical CT images were obtained to the right hip 
with images reconstructed in the axial plane at 2.5 mm intervals following the 
uneventful intravenous administration of 100 mL Isovue-370. 2-D sagittal, and 
coronal reconstructed images were made and reviewed. All CT scans performed at 
this facility use one or all of the following: Automated exposure control, 
adjustment of the mA and/or kVp according to patient's size, iterative 
reconstruction.  
  
Findings:   
Abnormal enhancing fluid collections are seen within the right lateral hip, and 
right proximal thigh soft tissues is appearance is concerning for multiple 
abscess collections within the soft tissues. These begin in the lateral hip soft 
tissues just inferior to the level of the right iliac crest. The subsequently 
extended into the proximal right thigh soft tissues with some involvement felt 
to be seen in all compartments of the proximal right thigh although most 
prominently involving the posterior compartment. The largest distinct collection 
appears to bridge the anterior and posterior compartments of the proximal right 
thigh seen on axial image 68 measuring 11.4 cm x 4.5 cm in greatest transverse 
dimensions. Note is made that some persistent abscess is seen on the most 
inferior image seen in the lateral compartment. Therefore, the distal extent of 
these changes is uncertain. Early osteomyelitis can be missed by CT imaging. However, no clear bony destructive changes are seen to strongly suggest 
osteomyelitis. No abnormal soft tissue gas is seen.  Likely reactive prominent 
right groin lymph nodes are seen. 
  
IMPRESSION 
 IMPRESSION:   
1. Abscess collections within the right lateral hip and proximal thigh soft 
tissues as described above. Signed By: Ken Dorsey MD   
 November 28, 2018

## 2018-11-28 NOTE — PROGRESS NOTES
Problem: Falls - Risk of 
Goal: *Absence of Falls Document Jorge To Fall Risk and appropriate interventions in the flowsheet. Outcome: Progressing Towards Goal 
Fall Risk Interventions: 
Mobility Interventions: Communicate number of staff needed for ambulation/transfer Medication Interventions: Patient to call before getting OOB History of Falls Interventions: Door open when patient unattended, Investigate reason for fall

## 2018-11-28 NOTE — PROGRESS NOTES
Hospitalist Progress Note   
2018 Admit Date: 2018 10:58 AM  
NAME: Jemima Alexander :  1975 MRN:  [id number] Attending: Sumit Boyce DO 
PCP:  None SUBJECTIVE:  
Jemima Alexander is a 45 yo M with heroin abuse who was admitted with R thigh abscess. He is s/p surgical debridement on  and now with wound vac in place. He reports pain currently controlled but he is still requiring pain control. He is complaining of not being able to sleep well. Blood and wound cultures with NGTD. 
 
- thigh pain still occurring and requiring dilaudid. Slept better last night with Restoril. He denies new concerns. Review of Systems negative with exception of pertinent positives noted above PHYSICAL EXAM  
 
Visit Vitals /82 Pulse 66 Temp 98.4 °F (36.9 °C) Resp 18 Ht 6' (1.829 m) Wt 68 kg (150 lb) SpO2 99% BMI 20.34 kg/m² Temp (24hrs), Av.4 °F (36.9 °C), Min:98.1 °F (36.7 °C), Max:98.7 °F (37.1 °C) Patient Vitals for the past 24 hrs: 
 Temp Pulse Resp BP SpO2  
18 1057 98.4 °F (36.9 °C) 66 18 133/82 99 % 18 0705 98.4 °F (36.9 °C) 83 18 135/81 99 % 18 0328 98.2 °F (36.8 °C) 77 18 132/81   
18 2307 98.7 °F (37.1 °C) 82 18 131/81 99 % 18 1940 98.6 °F (37 °C) 84 18 126/83 100 % 18 1622 98.1 °F (36.7 °C) 73 18 129/82 100 % Oxygen Therapy O2 Sat (%): 99 % (18 1057) Pulse via Oximetry: 82 beats per minute (18 1340) O2 Device: Room air (18 1307) Intake/Output Summary (Last 24 hours) at 2018 1459 Last data filed at 2018 1312 Gross per 24 hour Intake 3722 ml Output 1875 ml Net 1847 ml General: No acute distress   
Lungs:  CTA Bilaterally. Heart:  Regular rate and rhythm,  No murmur, rub, or gallop Abdomen: Soft, Non distended, Non tender, Positive bowel sounds Extremities: No cyanosis, clubbing or edema, wound vac in place to R thigh Neurologic:  No focal deficits Recent Results (from the past 24 hour(s)) METABOLIC PANEL, BASIC Collection Time: 11/28/18  4:33 AM  
Result Value Ref Range Sodium 139 136 - 145 mmol/L Potassium 3.5 3.5 - 5.1 mmol/L Chloride 106 98 - 107 mmol/L  
 CO2 25 21 - 32 mmol/L Anion gap 8 7 - 16 mmol/L Glucose 83 65 - 100 mg/dL BUN 9 6 - 23 MG/DL Creatinine 0.83 0.8 - 1.5 MG/DL  
 GFR est AA >60 >60 ml/min/1.73m2 GFR est non-AA >60 >60 ml/min/1.73m2 Calcium 8.3 8.3 - 10.4 MG/DL All Micro Results Procedure Component Value Units Date/Time CULTURE, BLOOD [[id number]] Collected:  11/24/18 1351 Order Status:  Completed Specimen:  Whole Blood Updated:  11/28/18 1747 Special Requests: --     
  RIGHT JUGULAR VEIN Culture result: NO GROWTH 4 DAYS     
 CULTURE, BLOOD [[id number]] Collected:  11/24/18 1400 Order Status:  Completed Specimen:  Blood Updated:  11/28/18 3458 Special Requests: NO SPECIAL REQUESTS Culture result: NO GROWTH 4 DAYS     
 CULTURE, ANAEROBIC [[id number]] Collected:  11/25/18 1220 Order Status:  Completed Specimen:  Abscess Updated:  11/28/18 9984 Special Requests: --     
  RIGHT THIGH Culture result:    
  NO ANAEROBES ISOLATED 3 DAYS  
     
 MSSA/MRSA SC BY PCR, NASAL SWAB [[id number]]  (Abnormal) Collected:  11/27/18 1222 Order Status:  Completed Specimen:  Nasal Updated:  11/27/18 1428 Special Requests: NO SPECIAL REQUESTS Culture result: MRSA target DNA not detected, SA target DNA detected. A MRSA negative, SA positive test result does not preclude MRSA nasal colonization. Keya Miranda STAIN [[id number]] Collected:  11/25/18 1220 Order Status:  Completed Specimen:  Wound from Abscess Updated:  11/27/18 8355 Special Requests: --     
  RIGHT THIGH 
  
  GRAM STAIN NO DEFINITE ORGANISM SEEN     
   20 TO 30 WBC'S PER OIF Culture result: NO GROWTH 2 DAYS Imaging:   
CT HIP RT W CONT Final Result IMPRESSION:    
1. Abscess collections within the right lateral hip and proximal thigh soft  
tissues as described above. ASSESSMENT Hospital Problems as of 11/28/2018 Never Reviewed Codes Class Noted - Resolved POA Polysubstance abuse ([facility] ICD-10-CM: F19.10 ICD-9-CM: 305.90  11/28/2018 - Present Yes Other insomnia ICD-10-CM: G47.09 
ICD-9-CM: 780.52  11/27/2018 - Present Unknown * (Principal) Necrotizing fasciitis ([facility] ICD-10-CM: M72.6 ICD-9-CM: 728.86  11/26/2018 - Present Unknown Abscess of thigh ICD-10-CM: L02.419 ICD-9-CM: 682.6  11/24/2018 - Present Unknown Plan: · R thigh abscess/necrotizing fasciitis- s/p excisional debridement of R thigh on 11/25. · Continue zosyn per ID recs (likely 2 weeks, then PO abx). · Vanco discontinued 11/28. · Wound vac. · Pain control. · Insomnia- improved with Restoril; continue. · Polysubstance abuse- counseled. DVT Prophylaxis: SCDs Signed By: Barry Matute MD   
 November 28, 2018

## 2018-11-28 NOTE — PROGRESS NOTES
END OF SHIFT NOTE: 
 
INTAKE/OUTPUT 
11/26 0701 - 11/27 0700 In: 5831 [P.O.:160; I.V.:1060] Out: Aakash Prasad [[id number]] Voiding: YES Catheter: NO 
Drain:  
Vacuum Assisted Closure Anterior;Right;Upper Leg (Active) Vac Status Suction, continuous 11/26/2018  4:58 PM  
Suction (mmHg) 125 11/26/2018  4:58 PM  
Site Assessment Clean, dry, & intact 11/26/2018  4:58 PM  
Dressing Status Clean, dry, & intact 11/26/2018  4:58 PM  
Drainage Description Serosanguinous 11/26/2018  4:58 PM  
Drainage Chamber Level (ml) 150 ml 11/27/2018  5:52 PM  
Output (ml) 50 ml 11/27/2018  5:52 PM  
 
 
 
 
 
 
Flatus: Patient does have flatus present. Stool:  0 occurrences. Characteristics: 
  
Emesis: 0 occurrences. Characteristics: VITAL SIGNS Patient Vitals for the past 12 hrs: 
 Temp Pulse Resp BP SpO2  
11/27/18 1622 98.1 °F (36.7 °C) 73 18 129/82 100 % 11/27/18 1142 98.6 °F (37 °C) 82 18 145/89 99 % 11/27/18 0740 98.5 °F (36.9 °C) 73 18 121/88 99 % Pain Assessment Pain Intensity 1: 7 (11/27/18 1500) Pain Location 1: Leg 
Pain Intervention(s) 1: Medication (see MAR) Patient Stated Pain Goal: 2 Ambulating No. PT following. Shift report given to oncoming nurse at the bedside.  
 
Telma Bueno RN

## 2018-11-28 NOTE — PROGRESS NOTES
Problem: Nutrition Deficit Goal: *Optimize nutritional status Nutrition Assessment for: MST referral for unintentional weight loss, unsure of amount and eating poorly due to decreased appetite. Assessment:  
Diet Order:  Regular Food/Nutrition and Pertinent History: Pt presents w/necrotizing fasciitis. Debridement performed of right thigh and drained of extensive intramuscular abscesses. Wound is noted to be full, thickness w/muscle and tendon exposed and wound vac applied. Pt has h/o heroin abuse. Pt reported weight loss of ~20# in a 2-week period PTA due to substance use. Pt reports #. Pt stated since admittance to hospital his appetite has increased and he is eating ~100% of his meals. Pt reports, he wished he could order \"double portions\". Pt does not take any oral nutritional supplements at home and is receptive to trying drinks at the hospital.   
  
Anthropometrics: 
Height: 6' (182.9 cm), Weight: 68 kg (150 lb), Weight Source: Patient stated, Body mass index is 20.34 kg/m². BMI class of normal weight. Weight hx per EMR (Based on connect [facility] functionality, RD cannot know if these weight are actual versus stated) Weight Metrics 11/24/2018 11/18/2018 11/12/2018 11/5/2018 1/7/2018 11/22/2017 3/8/2017 Weight 150 lb 162 lb 165 lb 165 lb 165 lb 162 lb 163 lb BMI 20.34 kg/m2 21.97 kg/m2 22.38 kg/m2 22.38 kg/m2 22.38 kg/m2 21.97 kg/m2 22.11 kg/m2 Noted: ~7% (12#) weight loss <1 week. Clinically significant. Macronutrient needs: EER: 2534-5837 kcal/day (30-35 kcal/kg actual BW) EPR: 102-136 g/day (1.2-2.0 grams/kg actual BW) Intake/Comparative Standards: No documented po intake. Patient stated intake of ~100% of all meals. This potentially meets ~100% of kcal and ~100% of protein needs. Nutrition Diagnosis: 
Increased protein energy needs related to increased metabolic needs as evidenced by extensive wound as noted above. Nutrition Intervention: Meals and snacks: Continue regular diet. Include double portions on all meal trays. Nutrition supplement therapy: Ensure Enlive BID. Chocolate or Vanilla Discharge nutrition plan:  Encouraged patient to continue with oral nutrition supplements at home if patient is not consuming >75% of all meals. Kathleen Higginbotham, Dietetic Intern

## 2018-11-29 PROBLEM — D64.9 ANEMIA: Status: ACTIVE | Noted: 2018-11-29

## 2018-11-29 LAB
ANION GAP SERPL CALC-SCNC: 7 MMOL/L (ref 7–16)
BACTERIA SPEC CULT: NORMAL
BACTERIA SPEC CULT: NORMAL
BASOPHILS # BLD: 0 K/UL (ref 0–0.2)
BASOPHILS NFR BLD: 0 % (ref 0–2)
BUN SERPL-MCNC: 10 MG/DL (ref 6–23)
CALCIUM SERPL-MCNC: 8.2 MG/DL (ref 8.3–10.4)
CHLORIDE SERPL-SCNC: 107 MMOL/L (ref 98–107)
CO2 SERPL-SCNC: 26 MMOL/L (ref 21–32)
CREAT SERPL-MCNC: 0.87 MG/DL (ref 0.8–1.5)
DIFFERENTIAL METHOD BLD: ABNORMAL
EOSINOPHIL # BLD: 0.3 K/UL (ref 0–0.8)
EOSINOPHIL NFR BLD: 3 % (ref 0.5–7.8)
ERYTHROCYTE [DISTWIDTH] IN BLOOD BY AUTOMATED COUNT: 14.9 % (ref 11.9–14.6)
FERRITIN SERPL-MCNC: 137 NG/ML (ref 8–388)
GLUCOSE SERPL-MCNC: 102 MG/DL (ref 65–100)
HCT VFR BLD AUTO: 22.3 % (ref 41.1–50.3)
HGB BLD-MCNC: 7 G/DL (ref 13.6–17.2)
IMM GRANULOCYTES # BLD: 0.1 K/UL (ref 0–0.5)
IMM GRANULOCYTES NFR BLD AUTO: 1 % (ref 0–5)
IRON SATN MFR SERPL: 18 %
IRON SERPL-MCNC: 37 UG/DL (ref 35–150)
LYMPHOCYTES # BLD: 2 K/UL (ref 0.5–4.6)
LYMPHOCYTES NFR BLD: 22 % (ref 13–44)
MCH RBC QN AUTO: 27 PG (ref 26.1–32.9)
MCHC RBC AUTO-ENTMCNC: 31.4 G/DL (ref 31.4–35)
MCV RBC AUTO: 86.1 FL (ref 79.6–97.8)
MONOCYTES # BLD: 0.5 K/UL (ref 0.1–1.3)
MONOCYTES NFR BLD: 6 % (ref 4–12)
NEUTS SEG # BLD: 6.2 K/UL (ref 1.7–8.2)
NEUTS SEG NFR BLD: 68 % (ref 43–78)
NRBC # BLD: 0 K/UL (ref 0–0.2)
PLATELET # BLD AUTO: 701 K/UL (ref 150–450)
PMV BLD AUTO: 9.4 FL (ref 9.4–12.3)
POTASSIUM SERPL-SCNC: 3.2 MMOL/L (ref 3.5–5.1)
RBC # BLD AUTO: 2.59 M/UL (ref 4.23–5.6)
SERVICE CMNT-IMP: NORMAL
SERVICE CMNT-IMP: NORMAL
SODIUM SERPL-SCNC: 140 MMOL/L (ref 136–145)
TIBC SERPL-MCNC: 206 UG/DL (ref 250–450)
TRANSFERRIN SERPL-MCNC: 149 MG/DL (ref 202–364)
WBC # BLD AUTO: 9.1 K/UL (ref 4.3–11.1)

## 2018-11-29 PROCEDURE — 74011250637 HC RX REV CODE- 250/637: Performed by: SURGERY

## 2018-11-29 PROCEDURE — 82728 ASSAY OF FERRITIN: CPT

## 2018-11-29 PROCEDURE — 30263N1 TRANSFUSE NONAUT RED BLOOD CELLS IN CENTRAL ART, PERC: ICD-10-PCS | Performed by: INTERNAL MEDICINE

## 2018-11-29 PROCEDURE — 74011250637 HC RX REV CODE- 250/637: Performed by: NURSE PRACTITIONER

## 2018-11-29 PROCEDURE — 85025 COMPLETE CBC W/AUTO DIFF WBC: CPT

## 2018-11-29 PROCEDURE — 74011000258 HC RX REV CODE- 258: Performed by: NURSE PRACTITIONER

## 2018-11-29 PROCEDURE — 74750000023 HC WOUND THERAPY

## 2018-11-29 PROCEDURE — 36430 TRANSFUSION BLD/BLD COMPNT: CPT

## 2018-11-29 PROCEDURE — 36415 COLL VENOUS BLD VENIPUNCTURE: CPT

## 2018-11-29 PROCEDURE — P9016 RBC LEUKOCYTES REDUCED: HCPCS

## 2018-11-29 PROCEDURE — 86901 BLOOD TYPING SEROLOGIC RH(D): CPT

## 2018-11-29 PROCEDURE — 77030039270 HC TU BLD FLTR CARD -A

## 2018-11-29 PROCEDURE — 80048 BASIC METABOLIC PNL TOTAL CA: CPT

## 2018-11-29 PROCEDURE — 36591 DRAW BLOOD OFF VENOUS DEVICE: CPT

## 2018-11-29 PROCEDURE — 86923 COMPATIBILITY TEST ELECTRIC: CPT

## 2018-11-29 PROCEDURE — 77030020263 HC SOL INJ SOD CL0.9% LFCR 1000ML

## 2018-11-29 PROCEDURE — 74011250636 HC RX REV CODE- 250/636: Performed by: INTERNAL MEDICINE

## 2018-11-29 PROCEDURE — 74011250636 HC RX REV CODE- 250/636: Performed by: NURSE PRACTITIONER

## 2018-11-29 PROCEDURE — 97530 THERAPEUTIC ACTIVITIES: CPT

## 2018-11-29 PROCEDURE — 65270000029 HC RM PRIVATE

## 2018-11-29 PROCEDURE — 83540 ASSAY OF IRON: CPT

## 2018-11-29 RX ORDER — SODIUM CHLORIDE 9 MG/ML
250 INJECTION, SOLUTION INTRAVENOUS AS NEEDED
Status: DISCONTINUED | OUTPATIENT
Start: 2018-11-29 | End: 2018-12-08 | Stop reason: HOSPADM

## 2018-11-29 RX ADMIN — HYDROMORPHONE HYDROCHLORIDE 1 MG: 1 INJECTION, SOLUTION INTRAMUSCULAR; INTRAVENOUS; SUBCUTANEOUS at 15:22

## 2018-11-29 RX ADMIN — PIPERACILLIN SODIUM,TAZOBACTAM SODIUM 3.38 G: 3; .375 INJECTION, POWDER, FOR SOLUTION INTRAVENOUS at 09:09

## 2018-11-29 RX ADMIN — OXYCODONE HYDROCHLORIDE AND ACETAMINOPHEN 1 TABLET: 7.5; 325 TABLET ORAL at 23:03

## 2018-11-29 RX ADMIN — HYDROMORPHONE HYDROCHLORIDE 1 MG: 1 INJECTION, SOLUTION INTRAMUSCULAR; INTRAVENOUS; SUBCUTANEOUS at 02:37

## 2018-11-29 RX ADMIN — SODIUM CHLORIDE 250 ML: 900 INJECTION, SOLUTION INTRAVENOUS at 15:41

## 2018-11-29 RX ADMIN — Medication 5 ML: at 14:00

## 2018-11-29 RX ADMIN — Medication 10 ML: at 21:34

## 2018-11-29 RX ADMIN — PIPERACILLIN SODIUM,TAZOBACTAM SODIUM 3.38 G: 3; .375 INJECTION, POWDER, FOR SOLUTION INTRAVENOUS at 17:23

## 2018-11-29 RX ADMIN — OXYCODONE HYDROCHLORIDE AND ACETAMINOPHEN 1 TABLET: 7.5; 325 TABLET ORAL at 00:05

## 2018-11-29 RX ADMIN — HYDROMORPHONE HYDROCHLORIDE 1 MG: 1 INJECTION, SOLUTION INTRAMUSCULAR; INTRAVENOUS; SUBCUTANEOUS at 11:21

## 2018-11-29 RX ADMIN — PIPERACILLIN SODIUM,TAZOBACTAM SODIUM 3.38 G: 3; .375 INJECTION, POWDER, FOR SOLUTION INTRAVENOUS at 02:32

## 2018-11-29 RX ADMIN — HYDROMORPHONE HYDROCHLORIDE 1 MG: 1 INJECTION, SOLUTION INTRAMUSCULAR; INTRAVENOUS; SUBCUTANEOUS at 19:35

## 2018-11-29 RX ADMIN — Medication 10 ML: at 05:00

## 2018-11-29 RX ADMIN — OXYCODONE HYDROCHLORIDE AND ACETAMINOPHEN 1 TABLET: 7.5; 325 TABLET ORAL at 04:58

## 2018-11-29 RX ADMIN — HYDROMORPHONE HYDROCHLORIDE 1 MG: 1 INJECTION, SOLUTION INTRAMUSCULAR; INTRAVENOUS; SUBCUTANEOUS at 07:37

## 2018-11-29 NOTE — PROGRESS NOTES
Hospitalist Progress Note   
2018 Admit Date: 2018 10:58 AM  
NAME: Saleem Potter :  1975 MRN:  [id number] Attending: Minnie Nowak DO 
PCP:  None SUBJECTIVE:  
Saleem Potter is a 45 yo M with heroin abuse who was admitted with R thigh abscess. He is s/p surgical debridement on  and now with wound vac in place. He reports pain currently controlled but he is still requiring pain control. He is complaining of not being able to sleep well. Blood and wound cultures with NGTD. 
 
- thigh pain still occurring and requiring dilaudid. Slept better last night with Restoril. He denies new concerns. - reports thigh pain controlled with meds. Ambulated halls today with PT, but unable to flex R knee. Review of Systems negative with exception of pertinent positives noted above PHYSICAL EXAM  
 
Visit Vitals /81 Pulse 80 Temp 98.4 °F (36.9 °C) Resp 18 Ht 6' (1.829 m) Wt 68 kg (150 lb) SpO2 99% BMI 20.34 kg/m² Temp (24hrs), Av °F (37.2 °C), Min:98.4 °F (36.9 °C), Max:99.5 °F (37.5 °C) Patient Vitals for the past 24 hrs: 
 Temp Pulse Resp BP SpO2  
18 1108 98.4 °F (36.9 °C) 80 18 130/81 99 % 18 0728 98.8 °F (37.1 °C) 74 18 135/90 98 % 18 0340 99.5 °F (37.5 °C) 80 18 136/73 99 % 18 2334 99.5 °F (37.5 °C) 69 18 137/79 100 % 18 1900 99.1 °F (37.3 °C) 87 18 139/85 100 % 18 1453 98.5 °F (36.9 °C) 90 18 134/77 99 % Oxygen Therapy O2 Sat (%): 99 % (18 1108) Pulse via Oximetry: 82 beats per minute (18 1340) O2 Device: Room air (18 1307) Intake/Output Summary (Last 24 hours) at 2018 1219 Last data filed at 2018 1108 Gross per 24 hour Intake 2654 ml Output 1925 ml Net 729 ml  
  
General: No acute distress   
Lungs:  CTA Bilaterally. Heart:  Regular rate and rhythm,  No murmur, rub, or gallop Abdomen: Soft, Non distended, Non tender, Positive bowel sounds Extremities: No cyanosis, clubbing or edema, wound vac in place to R thigh Neurologic:  No focal deficits Recent Results (from the past 24 hour(s)) METABOLIC PANEL, BASIC Collection Time: 11/29/18  4:46 AM  
Result Value Ref Range Sodium 140 136 - 145 mmol/L Potassium 3.2 (L) 3.5 - 5.1 mmol/L Chloride 107 98 - 107 mmol/L  
 CO2 26 21 - 32 mmol/L Anion gap 7 7 - 16 mmol/L Glucose 102 (H) 65 - 100 mg/dL BUN 10 6 - 23 MG/DL Creatinine 0.87 0.8 - 1.5 MG/DL  
 GFR est AA >60 >60 ml/min/1.73m2 GFR est non-AA >60 >60 ml/min/1.73m2 Calcium 8.2 (L) 8.3 - 10.4 MG/DL  
CBC WITH AUTOMATED DIFF Collection Time: 11/29/18  4:46 AM  
Result Value Ref Range WBC 9.1 4.3 - 11.1 K/uL  
 RBC 2.59 (L) 4.23 - 5.6 M/uL HGB 7.0 (L) 13.6 - 17.2 g/dL HCT 22.3 (L) 41.1 - 50.3 % MCV 86.1 79.6 - 97.8 FL  
 MCH 27.0 26.1 - 32.9 PG  
 MCHC 31.4 31.4 - 35.0 g/dL  
 RDW 14.9 (H) 11.9 - 14.6 % PLATELET 772 (H) 457 - 450 K/uL MPV 9.4 9.4 - 12.3 FL ABSOLUTE NRBC 0.00 0.0 - 0.2 K/uL  
 DF AUTOMATED NEUTROPHILS 68 43 - 78 % LYMPHOCYTES 22 13 - 44 % MONOCYTES 6 4.0 - 12.0 % EOSINOPHILS 3 0.5 - 7.8 % BASOPHILS 0 0.0 - 2.0 % IMMATURE GRANULOCYTES 1 0.0 - 5.0 %  
 ABS. NEUTROPHILS 6.2 1.7 - 8.2 K/UL  
 ABS. LYMPHOCYTES 2.0 0.5 - 4.6 K/UL  
 ABS. MONOCYTES 0.5 0.1 - 1.3 K/UL  
 ABS. EOSINOPHILS 0.3 0.0 - 0.8 K/UL  
 ABS. BASOPHILS 0.0 0.0 - 0.2 K/UL  
 ABS. IMM. GRANS. 0.1 0.0 - 0.5 K/UL All Micro Results Procedure Component Value Units Date/Time CULTURE, BLOOD [[id number]] Collected:  11/24/18 1351 Order Status:  Completed Specimen:  Whole Blood Updated:  11/29/18 0730 Special Requests: --     
  RIGHT JUGULAR VEIN Culture result: NO GROWTH 5 DAYS     
 CULTURE, BLOOD [[id number]] Collected:  11/24/18 1400 Order Status:  Completed Specimen:  Blood Updated:  11/29/18 0730 Special Requests: NO SPECIAL REQUESTS Culture result: NO GROWTH 5 DAYS     
 CULTURE, ANAEROBIC [[id number]] Collected:  11/25/18 1220 Order Status:  Completed Specimen:  Abscess Updated:  11/28/18 7850 Special Requests: --     
  RIGHT THIGH Culture result:    
  NO ANAEROBES ISOLATED 3 DAYS  
     
 MSSA/MRSA SC BY PCR, NASAL SWAB [[id number]]  (Abnormal) Collected:  11/27/18 1222 Order Status:  Completed Specimen:  Nasal Updated:  11/27/18 1425 Special Requests: NO SPECIAL REQUESTS Culture result: MRSA target DNA not detected, SA target DNA detected. A MRSA negative, SA positive test result does not preclude MRSA nasal colonization. Jason [place] STAIN [[id number]] Collected:  11/25/18 1220 Order Status:  Completed Specimen:  Wound from Abscess Updated:  11/27/18 4355 Special Requests: --     
  RIGHT THIGH 
  
  GRAM STAIN NO DEFINITE ORGANISM SEEN     
   20 TO 30 WBC'S PER OIF Culture result: NO GROWTH 2 DAYS Imaging:   
CT HIP RT W CONT Final Result IMPRESSION:    
1. Abscess collections within the right lateral hip and proximal thigh soft  
tissues as described above. ASSESSMENT Hospital Problems as of 11/29/2018 Never Reviewed Codes Class Noted - Resolved POA Polysubstance abuse ([facility] [address] ICD-10-CM: F19.10 ICD-9-CM: 305.90  11/28/2018 - Present Yes Other insomnia ICD-10-CM: G47.09 
ICD-9-CM: 780.52  11/27/2018 - Present Unknown * (Principal) Necrotizing fasciitis ([facility] [address] ICD-10-CM: M72.6 ICD-9-CM: 728.86  11/26/2018 - Present Unknown Abscess of thigh ICD-10-CM: L02.419 ICD-9-CM: 682.6  11/24/2018 - Present Unknown Plan: · R thigh abscess/necrotizing fasciitis- s/p excisional debridement of R thigh on 11/25. · Continue zosyn per ID recs (likely 2 weeks, then PO abx for total of 4 weeks of leg heals well). · Vanco discontinued 11/28. · Wound vac. · Pain control. · Insomnia- improved with Restoril; continue. · Polysubstance abuse- counseled. DVT Prophylaxis: SCDs Signed By: Ck Sawant MD   
 November 29, 2018

## 2018-11-29 NOTE — PROGRESS NOTES
Spoke to Mr. Daily Tobin and his wife about Case Management and discharge planning. He works at a [employer] [address] [facility], and has not yet qualified for [employer] (he is \"self pay\"). Mr. Daily Tobin admits to IV drug use, and he believes that is how he got his right thigh wound. Briefly discussed support groups such as AA, NA, and FAVOR. Mr. Daily Tobin will be here for several weeks to complete his course of IV abx (cannot do outpatient due history of IV drug use). Will possibly need a elly wound vac from [facility] at discharge. Will continue to follow and assist with discharge planning. Care Management Interventions Plan discussed with Pt/Family/Caregiver: No

## 2018-11-29 NOTE — PROGRESS NOTES
END OF SHIFT NOTE: 
 
INTAKE/OUTPUT 
11/27 0701 - 11/28 0700 In: 3095 [I.V.:3317] Out: 1425 [Urine:1275; Drains:150] Voiding: YES Catheter: NO 
Drain:  
Vacuum Assisted Closure Anterior;Right;Upper Leg (Active) Vac Status Suction, continuous 11/28/2018  9:53 AM  
Suction (mmHg) 125 11/28/2018  9:53 AM  
Site Assessment Clean, dry, & intact 11/28/2018  9:53 AM  
Dressing Status Clean, dry, & intact 11/28/2018  9:53 AM  
Drainage Description Serosanguinous 11/28/2018  9:53 AM  
Drainage Chamber Level (ml) 250 ml 11/28/2018  9:53 AM  
Output (ml) 100 ml 11/28/2018  9:53 AM  
 
 
 
 
 
 
Flatus: Patient does have flatus present. Stool:  0 occurrences. Characteristics: 
Stool Assessment Stool Appearance: Loose Emesis: 0 occurrences. Characteristics: VITAL SIGNS Patient Vitals for the past 12 hrs: 
 Temp Pulse Resp BP SpO2  
11/28/18 1453 98.5 °F (36.9 °C) 90 18 134/77 99 % 11/28/18 1057 98.4 °F (36.9 °C) 66 18 133/82 99 % Pain Assessment Pain Intensity 1: 6 (11/28/18 1738) Pain Location 1: Leg 
Pain Intervention(s) 1: Medication (see MAR) Patient Stated Pain Goal: 0 Ambulating Yes Shift report given to oncoming nurse at the bedside.  
 
Syl Brothers RN

## 2018-11-29 NOTE — PROGRESS NOTES
Problem: Mobility Impaired (Adult and Pediatric) Goal: *Acute Goals and Plan of Care (Insert Text) STG: ALL SHORT TERM GOALS MET 11/29/2018 
(1.)Mr. Rikki Demarco will move from supine to sit and sit to supine  with CONTACT GUARD ASSIST within 3 treatment day(s). (2.)Mr. Rikki Demarco will transfer from bed to chair and chair to bed with CONTACT GUARD ASSIST using the least restrictive device within 3 treatment day(s). (3.)Mr. Rikki Demarco will ambulate with CONTACT GUARD ASSIST for 30 feet with the least restrictive device within 3 treatment day(s). LTG: 
(1.)Mr. Rikki Demarco will move from supine to sit and sit to supine  in bed with INDEPENDENT within 7 treatment day(s). (2.)Mr. Rikki Demarco will transfer from bed to chair and chair to bed with MODIFIED INDEPENDENT using the least restrictive device within 7 treatment day(s). (3.)Mr. Rikki Demarco will ambulate with STAND BY ASSIST for 250+ feet with the least restrictive device within 7 treatment day(s). ________________________________________________________________________________________________ PHYSICAL THERAPY: Daily Note, Treatment Day: 1st, AM 11/29/2018INPATIENT: Hospital Day: 6 Payor: SELF PAY / Plan: [facility] SELF PAY / Product Type: Self Pay /  
  
NAME/AGE/GENDER: Shira Diaz is a 44 y.o. male PRIMARY DIAGNOSIS: Abscess of thigh Necrotizing fasciitis ([address] Necrotizing fasciitis ([address] Procedure(s) (LRB): 
I&D OF LEFT THIGH ABSCESS (Right) 4 Days Post-Op ICD-10: Treatment Diagnosis:  
 · Generalized Muscle Weakness (M62.81) · Difficulty in walking, Not elsewhere classified (R26.2) · History of falling (Z91.81) Precaution/Allergies: 
Patient has no known allergies. ASSESSMENT:  
Mr. Rikki Demarco was supine upon contact and agreeable to PT; motivated to mobilize. Patient performs supine to sit and transfer to standing all in one motion with mod I due to difficulty with R knee flexion.  Once standing patient able to ambulate 61' with use of rolling walker, CGA-SBA and occasional cues for sequencing with rolling walker. Returns to straight back chair at bedside as per his report it is more comfortable to sit in than recliner chair. Still unable to perform right knee flexion seated in straight back chair. Encouraged patient to perform knee flexion throughout the day and to his tolerance. Overall good progress towards physical therapy goals as noted by increased gait distance and tolerance for mobility. Above goals have been met thus far. Will continue efforts. This section established at most recent assessment PROBLEM LIST (Impairments causing functional limitations): 1. Decreased Strength 2. Decreased ADL/Functional Activities 3. Decreased Transfer Abilities 4. Decreased Ambulation Ability/Technique 5. Decreased Balance 6. Increased Pain 7. Decreased Activity Tolerance 8. Increased Fatigue 9. Decreased Flexibility/Joint Mobility 10. Decreased [place] with Home Exercise Program 
 INTERVENTIONS PLANNED: (Benefits and precautions of physical therapy have been discussed with the patient.) 1. Balance Exercise 2. Bed Mobility 3. Family Education 4. Gait Training 5. Home Exercise Program (HEP) 6. Neuromuscular Re-education/Strengthening 7. Range of Motion (ROM) 8. Therapeutic Activites 9. Therapeutic Exercise/Strengthening 10. Transfer Training TREATMENT PLAN: Frequency/Duration: 3 times a week for duration of hospital stay Rehabilitation Potential For Stated Goals: Good RECOMMENDED REHABILITATION/EQUIPMENT: (at time of discharge pending progress): Due to the probability of continued deficits (see above) this patient will likely need continued skilled physical therapy after discharge. Equipment:  
? None at this time HISTORY:  
History of Present Injury/Illness (Reason for Referral): 
See H&P below Shira Diaz is a 44 y.o. male with a history of GERD and heroin abuse presents with pain in his right hip region for the past few weeks. Cesar Posey admits to falling 4 days prior to the development of pain.  He reports subjective fevers.  He states now it is very difficult to walk due to pain.  Denies similar symptoms in the past.  No radiation of pain.  Patient is quite a poor historian.  Reviewed chart and noted that he had been seen in the emergency department November 5, 12th, and 18th.  November 5th, he was seen for fever, Nov 12 for abdominal pain and heroin use, November 18 for the right hip pain.   At that visit he was found to have significant leukocytosis and after further search was found to have a large left arm abscess that was drained.  He was prescribed clindamycin.  Patient states he took all of this antibiotic, but interestingly, it was only prescribed 6 days ago. [place] El[place] admits that he \"overtook it. \" Cesar Posey also never returned for packing removal or dressing change. Cesar Posey states he has been too focused on the pain in his right hip. No prior hip surgeries. He states last heroin use was 4 days ago and snorts it. Cesar Posey denies any IV drug use. Past Medical History/Comorbidities:  
Mr. Rikki Demarco  has a past medical history of GERD (gastroesophageal reflux disease), Necrotizing fasciitis ([address] and Other insomnia. Mr. Rikki Demarco  has a past surgical history that includes hx other surgical and I&D OF LEFT THIGH ABSCESS (Right, 11/25/2018). Social History/Living Environment:  
Home Environment: Private residence # Steps to Enter: 3 One/Two Story Residence: One story Living Alone: No 
Support Systems: Spouse/Significant Other/Partner Patient Expects to be Discharged to[de-identified] Private residence Current DME Used/Available at Home: None Prior Level of Function/Work/Activity: 
Independent with all mobility at baseline, 1 fall Number of Personal Factors/Comorbidities that affect the Plan of Care: 1-2: MODERATE COMPLEXITY EXAMINATION:  
Most Recent Physical Functioning:  
Gross Assessment: 
  
         
  
Posture: 
  
Balance: 
Sitting: Intact Standing: Impaired Standing - Static: Good Standing - Dynamic : Fair Bed Mobility: 
Supine to Sit: Modified independent Wheelchair Mobility: 
  
Transfers: 
Sit to Stand: Modified independent Stand to Sit: Supervision Gait: 
  
Base of Support: Narrowed Speed/Noelle: Slow Step Length: Left shortened;Right shortened Gait Abnormalities: Decreased step clearance Distance (ft): 60 Feet (ft) Assistive Device: Walker, rolling Ambulation - Level of Assistance: Stand-by assistance;Contact guard assistance Interventions: Safety awareness training;Verbal cues Body Structures Involved: 1. Nerves 2. Heart 3. Lungs 4. Bones 5. Joints 6. Muscles 7. Ligaments Body Functions Affected: 1. Sensory/Pain 2. Cardio 3. Respiratory 4. Neuromusculoskeletal 
5. Movement Related 6. Skin Related Activities and Participation Affected: 1. General Tasks and Demands 2. Mobility 3. Self Care 4. Domestic Life 5. Interpersonal Interactions and Relationships 6. Community, Social and [place] Number of elements that affect the Plan of Care: 4+: HIGH COMPLEXITY CLINICAL PRESENTATION:  
Presentation: Evolving clinical presentation with changing clinical characteristics: MODERATE COMPLEXITY CLINICAL DECISION MAKING:  
[facility] [employer] AM-PAC 6 Clicks Basic Mobility Inpatient Short Form How much difficulty does the patient currently have. .. Unable A Lot A Little None 1. Turning over in bed (including adjusting bedclothes, sheets and blankets)? [] 1   [] 2   [x] 3   [] 4  
2. Sitting down on and standing up from a chair with arms ( e.g., wheelchair, bedside commode, etc.)   [] 1   [x] 2   [] 3   [] 4  
3. Moving from lying on back to sitting on the side of the bed?    [] 1   [] 2   [x] 3   [] 4  
 How much help from another person does the patient currently need. .. Total A Lot A Little None 4. Moving to and from a bed to a chair (including a wheelchair)? [] 1   [x] 2   [] 3   [] 4  
5. Need to walk in hospital room? [] 1   [x] 2   [] 3   [] 4  
6. Climbing 3-5 steps with a railing? [] 1   [x] 2   [] 3   [] 4  
© 2007, Trustees of [facility] [employer], under license to [employer]. All rights reserved Score:  Initial: 14 Most Recent: X (Date: -- ) Interpretation of Tool:  Represents activities that are increasingly more difficult (i.e. Bed mobility, Transfers, Gait). Score 24 23 22-20 19-15 14-10 9-7 6 Modifier CH CI CJ CK CL CM CN   
 
? Mobility - Walking and Moving Around:  
  - CURRENT STATUS: CL - 60%-79% impaired, limited or restricted  - GOAL STATUS: CK - 40%-59% impaired, limited or restricted  - D/C STATUS:  ---------------To be determined--------------- Payor: SELF PAY / Plan: [facility] SELF PAY / Product Type: Self Pay /   
 
Medical Necessity:    
· Patient is expected to demonstrate progress in strength, range of motion, balance, coordination and functional technique to decrease assistance required with gait, transfers, and functional mobility. Yudi Randhawa Reason for Services/Other Comments: 
· Patient continues to require skilled intervention due to decreased strength, decreased balance, decreased functional tolerance, decreased cardiopulmonary endurance affecting participation in basic ADLs and functional tasks. Use of outcome tool(s) and clinical judgement create a POC that gives a: Clear prediction of patient's progress: LOW COMPLEXITY  
  
 
 
 
TREATMENT:  
(In addition to Assessment/Re-Assessment sessions the following treatments were rendered) Pre-treatment Symptoms/Complaints: none Pain: Initial:  
Pain Intensity 1: 0  Post Session: 0/10 Therapeutic Activity: (    15 Minutes):   Therapeutic activities including bed mobility training, transfer training, ambulation on level ground, static/dynamic standing balance training, instruction in sequencing with rolling walker, and patient education to improve mobility, strength, balance and activity tolerance. Required minimal Safety awareness training;Verbal cues to promote static and dynamic balance in standing and promote coordination of bilateral, lower extremity(s). Date: 
11/27/18 Date: 
 Date: Activity/Exercise Parameters Parameters Parameters Ankle pumps 15 X B Quad set 15 X B Glute set 15 X Heel slides 10 X B AA R Hip abduction 10 X B AA R Braces/Orthotics/Lines/Etc:  
· IV 
· drain wound vac · O2 Device: Room air Treatment/Session Assessment:   
· Response to Treatment:  See above · Interdisciplinary Collaboration:  
o Physical Therapy Assistant 
o Registered Nurse · After treatment position/precautions:  
o Up in chair 
o Bed/Chair-wheels locked 
o Bed in low position 
o Call light within reach 
o RN notified · Compliance with Program/Exercises: Will assess as treatment progresses · Recommendations/Intent for next treatment session: \"Next visit will focus on advancements to more challenging activities and reduction in assistance provided\". Total Treatment Duration: PT Patient Time In/Time Out Time In: 8049 Time Out: 1120 Lucas Feliz, PTA

## 2018-11-30 LAB
ABO + RH BLD: NORMAL
ANION GAP SERPL CALC-SCNC: 6 MMOL/L (ref 7–16)
BLD PROD TYP BPU: NORMAL
BLOOD GROUP ANTIBODIES SERPL: NORMAL
BPU ID: NORMAL
BUN SERPL-MCNC: 9 MG/DL (ref 6–23)
CALCIUM SERPL-MCNC: 8.5 MG/DL (ref 8.3–10.4)
CHLORIDE SERPL-SCNC: 105 MMOL/L (ref 98–107)
CO2 SERPL-SCNC: 27 MMOL/L (ref 21–32)
CREAT SERPL-MCNC: 0.85 MG/DL (ref 0.8–1.5)
CROSSMATCH RESULT,%XM: NORMAL
GLUCOSE SERPL-MCNC: 92 MG/DL (ref 65–100)
HGB BLD-MCNC: 8.5 G/DL (ref 13.6–17.2)
POTASSIUM SERPL-SCNC: 3.2 MMOL/L (ref 3.5–5.1)
SODIUM SERPL-SCNC: 138 MMOL/L (ref 136–145)
SPECIMEN EXP DATE BLD: NORMAL
STATUS OF UNIT,%ST: NORMAL
UNIT DIVISION, %UDIV: 0

## 2018-11-30 PROCEDURE — 65270000029 HC RM PRIVATE

## 2018-11-30 PROCEDURE — 74011250636 HC RX REV CODE- 250/636: Performed by: NURSE PRACTITIONER

## 2018-11-30 PROCEDURE — 74011000258 HC RX REV CODE- 258: Performed by: INTERNAL MEDICINE

## 2018-11-30 PROCEDURE — 97530 THERAPEUTIC ACTIVITIES: CPT

## 2018-11-30 PROCEDURE — 74011250636 HC RX REV CODE- 250/636: Performed by: INTERNAL MEDICINE

## 2018-11-30 PROCEDURE — 77030020263 HC SOL INJ SOD CL0.9% LFCR 1000ML

## 2018-11-30 PROCEDURE — 74011000258 HC RX REV CODE- 258: Performed by: NURSE PRACTITIONER

## 2018-11-30 PROCEDURE — 74011250637 HC RX REV CODE- 250/637: Performed by: SURGERY

## 2018-11-30 PROCEDURE — 85018 HEMOGLOBIN: CPT

## 2018-11-30 PROCEDURE — 80048 BASIC METABOLIC PNL TOTAL CA: CPT

## 2018-11-30 PROCEDURE — 74750000023 HC WOUND THERAPY

## 2018-11-30 PROCEDURE — 97605 NEG PRS WND THER DME<=50SQCM: CPT

## 2018-11-30 PROCEDURE — 77030019934 HC DRSG VAC ASST KCON -B

## 2018-11-30 RX ADMIN — Medication 10 ML: at 19:35

## 2018-11-30 RX ADMIN — Medication 10 ML: at 14:00

## 2018-11-30 RX ADMIN — HYDROMORPHONE HYDROCHLORIDE 1 MG: 1 INJECTION, SOLUTION INTRAMUSCULAR; INTRAVENOUS; SUBCUTANEOUS at 02:23

## 2018-11-30 RX ADMIN — HYDROMORPHONE HYDROCHLORIDE 1 MG: 1 INJECTION, SOLUTION INTRAMUSCULAR; INTRAVENOUS; SUBCUTANEOUS at 10:47

## 2018-11-30 RX ADMIN — HYDROMORPHONE HYDROCHLORIDE 1 MG: 1 INJECTION, SOLUTION INTRAMUSCULAR; INTRAVENOUS; SUBCUTANEOUS at 23:22

## 2018-11-30 RX ADMIN — PIPERACILLIN SODIUM,TAZOBACTAM SODIUM 3.38 G: 3; .375 INJECTION, POWDER, FOR SOLUTION INTRAVENOUS at 17:43

## 2018-11-30 RX ADMIN — PIPERACILLIN SODIUM,TAZOBACTAM SODIUM 3.38 G: 3; .375 INJECTION, POWDER, FOR SOLUTION INTRAVENOUS at 10:01

## 2018-11-30 RX ADMIN — HYDROMORPHONE HYDROCHLORIDE 1 MG: 1 INJECTION, SOLUTION INTRAMUSCULAR; INTRAVENOUS; SUBCUTANEOUS at 15:39

## 2018-11-30 RX ADMIN — PIPERACILLIN SODIUM,TAZOBACTAM SODIUM 3.38 G: 3; .375 INJECTION, POWDER, FOR SOLUTION INTRAVENOUS at 01:09

## 2018-11-30 RX ADMIN — HYDROMORPHONE HYDROCHLORIDE 1 MG: 1 INJECTION, SOLUTION INTRAMUSCULAR; INTRAVENOUS; SUBCUTANEOUS at 06:57

## 2018-11-30 RX ADMIN — HYDROMORPHONE HYDROCHLORIDE 1 MG: 1 INJECTION, SOLUTION INTRAMUSCULAR; INTRAVENOUS; SUBCUTANEOUS at 19:35

## 2018-11-30 RX ADMIN — Medication 10 ML: at 05:24

## 2018-11-30 NOTE — PROGRESS NOTES
Infectious Disease Progress Note Today's Date: 2018 Admit Date: 2018 Impression: · Necrotizing fasciitis right thigh s/p I&D 18; operative cx  so far without growth. Blood cx  negative. Blood cx  no growth to date. Wound vac should be changed today. · Debrided area close to hip joint. CT as below. Surgery exploring wound vac options. · MSSA nares colonization positive but not MRSA. History of strep intermedius of shoulder area in 2017 and an alpha hemolytic strep in January from unclear site. · Heroin abuse; \"skin pops\": states had injected right hip/thigh area multiple times. · Tobacco abuse · Medical non-adherence Plan:  
 
· Continue Zosyn. · Plan to treat for 4 weeks (18) total given extensive wounds and proximity to right hip joint. May use combination of IV and oral abx with possible switch to oral route if site heals well. · Follow wound site evaluations. · Since plan is to remain here, can change IJ to PICC if desired. Anti-infectives:  
Vanc (- Zosyn (- Subjective: No complaints; having one liquid stool a day. Thigh pain is reduced Review of Systems:  A comprehensive review of systems was negative except for that written in the History of Present Illness. Objective:  
 
Visit Vitals /86 (BP 1 Location: Right arm, BP Patient Position: At rest) Pulse 74 Temp 98 °F (36.7 °C) Resp 18 Ht 6' (1.829 m) Wt 68 kg (150 lb) SpO2 99% BMI 20.34 kg/m² Temp (24hrs), Av.9 °F (37.2 °C), Min:98 °F (36.7 °C), Max:99.8 °F (37.7 °C) Lines:R IJ line, wound vac right thigh Physical Exam: Exam:   
 General: NC/AT, alert and cooperative, looks stated age, in NAD HEENT: PERRL, non-icteric sclera, edentulous, no thrush Neck: supple, symmetric, no masses or lymphadenopathy Cardiac:Nl S1/S2, no murmurs/rubs/gallops/clicks, no LE edema Pulmonary:clear bilaterally without rales or wheezes Abdomen: soft, non-tender, non-distended, BS normal 
 Gentital:external genitalia normal Gonzales : none Skin:no rashes, lesions or wounds MSK:no enlarged or swollen joints in limbs or sternoclavicular area Extremities: no cords/clots/cyanosis, pulses palpable Right thigh swelling has resolved. VAC in place Psychiatric:  mood and affect appropriate to situation Data Review: CBC:  
Recent Labs 11/30/18 
0446 11/29/18 
9710 WBC  --  9.1 RBC  --  2.59* HGB 8.5* 7.0* HCT  --  22.3*  
PLT  --  701* GRANS  --  68  
LYMPH  --  22 EOS  --  3 CMP:  
Recent Labs 11/30/18 
6804 11/29/18 0446 11/28/18 
1323 GLU 92 102* 83  140 139  
K 3.2* 3.2* 3.5  107 106 CO2 27 26 25 BUN 9 10 9 CREA 0.85 0.87 0.83 CA 8.5 8.2* 8.3 AGAP 6* 7 8 Liver Enzymes: No results for input(s): TP, ALB, TBIL, AP, SGOT, GPT in the last 72 hours. No lab exists for component: DBIL Microbiology:  
 
All Micro Results Procedure Component Value Units Date/Time Makayla Donovan [[id number]] Collected:  11/25/18 1220 Order Status:  Completed Specimen:  Abscess Updated:  11/30/18 1896 Special Requests: --     
  RIGHT THIGH Culture result:    
  NO ANAEROBES ISOLATED 5 DAYS  
     
 CULTURE, BLOOD [[id number]] Collected:  11/24/18 1351 Order Status:  Completed Specimen:  Whole Blood Updated:  11/29/18 0730 Special Requests: --     
  RIGHT JUGULAR VEIN Culture result: NO GROWTH 5 DAYS     
 CULTURE, BLOOD [[id number]] Collected:  11/24/18 1400 Order Status:  Completed Specimen:  Blood Updated:  11/29/18 0730 Special Requests: NO SPECIAL REQUESTS Culture result: NO GROWTH 5 DAYS     
 MSSA/MRSA SC BY PCR, NASAL SWAB [[id number]]  (Abnormal) Collected:  11/27/18 1222 Order Status:  Completed Specimen:  Nasal Updated:  11/27/18 1428 Special Requests: NO SPECIAL REQUESTS Culture result: MRSA target DNA not detected, SA target DNA detected. A MRSA negative, SA positive test result does not preclude MRSA nasal colonization. Sandy Haley STAIN [[id number]] Collected:  11/25/18 1220 Order Status:  Completed Specimen:  Wound from Abscess Updated:  11/27/18 8518 Special Requests: --     
  RIGHT THIGH 
  
  GRAM STAIN NO DEFINITE ORGANISM SEEN     
   20 TO 30 WBC'S PER OIF Culture result: NO GROWTH 2 DAYS Imaging:  
 
CT right hip with contrast 11/24/2018 
  
CLINICAL HISTORY: Right hip pain and swelling to right hip and buttocks that is 
warm and painful to touch for one week. 
  
TECHNIQUE: Multiple contiguous helical CT images were obtained to the right hip 
with images reconstructed in the axial plane at 2.5 mm intervals following the 
uneventful intravenous administration of 100 mL Isovue-370. 2-D sagittal, and 
coronal reconstructed images were made and reviewed. All CT scans performed at 
this facility use one or all of the following: Automated exposure control, 
adjustment of the mA and/or kVp according to patient's size, iterative 
reconstruction.  
  
Findings:   
Abnormal enhancing fluid collections are seen within the right lateral hip, and 
right proximal thigh soft tissues is appearance is concerning for multiple 
abscess collections within the soft tissues. These begin in the lateral hip soft 
tissues just inferior to the level of the right iliac crest. The subsequently 
extended into the proximal right thigh soft tissues with some involvement felt 
to be seen in all compartments of the proximal right thigh although most 
prominently involving the posterior compartment. The largest distinct collection 
appears to bridge the anterior and posterior compartments of the proximal right 
thigh seen on axial image 68 measuring 11.4 cm x 4.5 cm in greatest transverse 
dimensions.  Note is made that some persistent abscess is seen on the most 
 inferior image seen in the lateral compartment. Therefore, the distal extent of 
these changes is uncertain. Early osteomyelitis can be missed by CT imaging. However, no clear bony destructive changes are seen to strongly suggest 
osteomyelitis. No abnormal soft tissue gas is seen. Likely reactive prominent 
right groin lymph nodes are seen. 
  
IMPRESSION IMPRESSION:   
1. Abscess collections within the right lateral hip and proximal thigh soft 
tissues as described above. Signed By: Herminio Bah MD   
 November 30, 2018

## 2018-11-30 NOTE — PROGRESS NOTES
Problem: Mobility Impaired (Adult and Pediatric) Goal: *Acute Goals and Plan of Care (Insert Text) STG: ALL SHORT TERM GOALS MET 11/29/2018 
(1.)Mr. Eric Still will move from supine to sit and sit to supine  with CONTACT GUARD ASSIST within 3 treatment day(s). (2.)Mr. Eric Still will transfer from bed to chair and chair to bed with CONTACT GUARD ASSIST using the least restrictive device within 3 treatment day(s). (3.)Mr. Eric Still will ambulate with CONTACT GUARD ASSIST for 30 feet with the least restrictive device within 3 treatment day(s). LTG: 
(1.)Mr. Eric Still will move from supine to sit and sit to supine  in bed with INDEPENDENT within 7 treatment day(s). (2.)Mr. Eric Still will transfer from bed to chair and chair to bed with MODIFIED INDEPENDENT using the least restrictive device within 7 treatment day(s). (3.)Mr. Eric Still will ambulate with STAND BY ASSIST for 250+ feet with the least restrictive device within 7 treatment day(s). ________________________________________________________________________________________________ PHYSICAL THERAPY: Daily Note, Treatment Day: 2nd, AM 11/30/2018INPATIENT: Hospital Day: 7 Payor: SELF PAY / Plan: [facility] SELF PAY / Product Type: Self Pay /  
  
NAME/AGE/GENDER: Jessie Toney is a 44 y.o. male PRIMARY DIAGNOSIS: Abscess of thigh Necrotizing fasciitis ([address] Necrotizing fasciitis ([address] Procedure(s) (LRB): 
I&D OF LEFT THIGH ABSCESS (Right) 5 Days Post-Op ICD-10: Treatment Diagnosis:  
 · Generalized Muscle Weakness (M62.81) · Difficulty in walking, Not elsewhere classified (R26.2) · History of falling (Z91.81) Precaution/Allergies: 
Patient has no known allergies. ASSESSMENT:  
Mr. Eric Still was supine upon contact and agreeable to PT; motivated to mobilize. Patient performs supine to sit and transfer to standing all in one motion with mod I due to difficulty with R knee flexion.  Once standing patient able to significantly increase gait distance to 250' with use of rolling walker, CGA-SBA and occasional cues for sequencing with rolling walker. Returns to EOB and to supine with mod I. Still having difficulty with right knee flexion. Encouraged patient to perform knee flexion throughout the day and to his tolerance. Overall good progress towards physical therapy goals as noted by increased gait distance and tolerance for mobility. Above goals have been met thus far. Will continue efforts. This section established at most recent assessment PROBLEM LIST (Impairments causing functional limitations): 1. Decreased Strength 2. Decreased ADL/Functional Activities 3. Decreased Transfer Abilities 4. Decreased Ambulation Ability/Technique 5. Decreased Balance 6. Increased Pain 7. Decreased Activity Tolerance 8. Increased Fatigue 9. Decreased Flexibility/Joint Mobility 10. Decreased [place] with Home Exercise Program 
 INTERVENTIONS PLANNED: (Benefits and precautions of physical therapy have been discussed with the patient.) 1. Balance Exercise 2. Bed Mobility 3. Family Education 4. Gait Training 5. Home Exercise Program (HEP) 6. Neuromuscular Re-education/Strengthening 7. Range of Motion (ROM) 8. Therapeutic Activites 9. Therapeutic Exercise/Strengthening 10. Transfer Training TREATMENT PLAN: Frequency/Duration: 3 times a week for duration of hospital stay Rehabilitation Potential For Stated Goals: Good RECOMMENDED REHABILITATION/EQUIPMENT: (at time of discharge pending progress): Due to the probability of continued deficits (see above) this patient will likely need continued skilled physical therapy after discharge. Equipment:  
? None at this time HISTORY:  
History of Present Injury/Illness (Reason for Referral): 
See H&P below Home Jamarcus is a 44 y.o. male with a history of GERD and heroin abuse presents with pain in his right hip region for the past few weeks. [facility] admits to falling 4 days prior to the development of pain.  He reports subjective fevers.  He states now it is very difficult to walk due to pain.  Denies similar symptoms in the past.  No radiation of pain.  Patient is quite a poor historian.  Reviewed chart and noted that he had been seen in the emergency department November 5, 12th, and 18th.  November 5th, he was seen for fever, Nov 12 for abdominal pain and heroin use, November 18 for the right hip pain.   At that visit he was found to have significant leukocytosis and after further search was found to have a large left arm abscess that was drained.  He was prescribed clindamycin.  Patient states he took all of this antibiotic, but interestingly, it was only prescribed 6 days ago. Marcia Nabeel admits that he \"overtook it. \" [facility] also never returned for packing removal or dressing change. [facility] states he has been too focused on the pain in his right hip. No prior hip surgeries. He states last heroin use was 4 days ago and snorts it. [facility] denies any IV drug use. Past Medical History/Comorbidities:  
Mr. Mehdi Roberts  has a past medical history of GERD (gastroesophageal reflux disease), Necrotizing fasciitis ([address] and Other insomnia. Mr. Mehdi Roberts  has a past surgical history that includes hx other surgical and I&D OF LEFT THIGH ABSCESS (Right, 11/25/2018). Social History/Living Environment:  
Home Environment: Private residence # Steps to Enter: 3 One/Two Story Residence: One story Living Alone: No 
Support Systems: Spouse/Significant Other/Partner Patient Expects to be Discharged to[de-identified] Private residence Current DME Used/Available at Home: None Prior Level of Function/Work/Activity: 
Independent with all mobility at baseline, 1 fall Number of Personal Factors/Comorbidities that affect the Plan of Care: 1-2: MODERATE COMPLEXITY EXAMINATION:  
Most Recent Physical Functioning: Gross Assessment: 
  
         
  
Posture: 
  
Balance: 
Sitting: Intact Standing: Impaired Standing - Static: Good Standing - Dynamic : Fair Bed Mobility: 
Supine to Sit: Modified independent Wheelchair Mobility: 
  
Transfers: 
Sit to Stand: Modified independent Stand to Sit: Modified independent Gait: 
  
Base of Support: Narrowed Speed/Noelle: Pace decreased (<100 feet/min) Step Length: Left shortened;Right shortened Gait Abnormalities: Decreased step clearance Distance (ft): 250 Feet (ft) Assistive Device: Walker, rolling Ambulation - Level of Assistance: Stand-by assistance;Supervision Interventions: Safety awareness training; Tactile cues; Verbal cues Body Structures Involved: 1. Nerves 2. Heart 3. Lungs 4. Bones 5. Joints 6. Muscles 7. Ligaments Body Functions Affected: 1. Sensory/Pain 2. Cardio 3. Respiratory 4. Neuromusculoskeletal 
5. Movement Related 6. Skin Related Activities and Participation Affected: 1. General Tasks and Demands 2. Mobility 3. Self Care 4. Domestic Life 5. Interpersonal Interactions and Relationships 6. Community, Social and [place] Number of elements that affect the Plan of Care: 4+: HIGH COMPLEXITY CLINICAL PRESENTATION:  
Presentation: Evolving clinical presentation with changing clinical characteristics: MODERATE COMPLEXITY CLINICAL DECISION MAKING:  
[employer] [facility] 6 Clicks Basic Mobility Inpatient Short Form How much difficulty does the patient currently have. .. Unable A Lot A Little None 1. Turning over in bed (including adjusting bedclothes, sheets and blankets)? [] 1   [] 2   [x] 3   [] 4  
2. Sitting down on and standing up from a chair with arms ( e.g., wheelchair, bedside commode, etc.)   [] 1   [x] 2   [] 3   [] 4  
3. Moving from lying on back to sitting on the side of the bed? [] 1   [] 2   [x] 3   [] 4 How much help from another person does the patient currently need. .. Total A Lot A Little None 4. Moving to and from a bed to a chair (including a wheelchair)? [] 1   [x] 2   [] 3   [] 4  
5. Need to walk in hospital room? [] 1   [x] 2   [] 3   [] 4  
6. Climbing 3-5 steps with a railing? [] 1   [x] 2   [] 3   [] 4  
© 2007, Trustees of [facility] [employer], under license to [employer]. All rights reserved Score:  Initial: 14 Most Recent: X (Date: -- ) Interpretation of Tool:  Represents activities that are increasingly more difficult (i.e. Bed mobility, Transfers, Gait). Score 24 23 22-20 19-15 14-10 9-7 6 Modifier CH CI CJ CK CL CM CN   
 
? Mobility - Walking and Moving Around:  
  - CURRENT STATUS: CL - 60%-79% impaired, limited or restricted  - GOAL STATUS: CK - 40%-59% impaired, limited or restricted  - D/C STATUS:  ---------------To be determined--------------- Payor: SELF PAY / Plan: [facility] SELF PAY / Product Type: Self Pay /   
 
Medical Necessity:    
· Patient is expected to demonstrate progress in strength, range of motion, balance, coordination and functional technique to decrease assistance required with gait, transfers, and functional mobility. Vitaliy Mills Reason for Services/Other Comments: 
· Patient continues to require skilled intervention due to decreased strength, decreased balance, decreased functional tolerance, decreased cardiopulmonary endurance affecting participation in basic ADLs and functional tasks. Use of outcome tool(s) and clinical judgement create a POC that gives a: Clear prediction of patient's progress: LOW COMPLEXITY  
  
 
 
 
TREATMENT:  
(In addition to Assessment/Re-Assessment sessions the following treatments were rendered) Pre-treatment Symptoms/Complaints: none Pain: Initial:  
Pain Intensity 1: 0  Post Session: 0/10 Therapeutic Activity: (    15 Minutes):   Therapeutic activities including bed mobility training, transfer training, ambulation on level ground, static/dynamic standing balance training, instruction in sequencing with rolling walker, and patient education to improve mobility, strength, balance and activity tolerance. Required minimal Safety awareness training; Tactile cues; Verbal cues to promote static and dynamic balance in standing and promote coordination of bilateral, lower extremity(s). Date: 
11/27/18 Date: 
 Date: Activity/Exercise Parameters Parameters Parameters Ankle pumps 15 X B Quad set 15 X B Glute set 15 X Heel slides 10 X B AA R Hip abduction 10 X B AA R Braces/Orthotics/Lines/Etc:  
· IV 
· drain wound vac · O2 Device: Room air Treatment/Session Assessment:   
· Response to Treatment:  See above · Interdisciplinary Collaboration:  
o Physical Therapy Assistant 
o Registered Nurse · After treatment position/precautions:  
o Supine in bed 
o Bed/Chair-wheels locked 
o Bed in low position 
o Call light within reach 
o RN notified · Compliance with Program/Exercises: Will assess as treatment progresses · Recommendations/Intent for next treatment session: \"Next visit will focus on advancements to more challenging activities and reduction in assistance provided\". Total Treatment Duration: PT Patient Time In/Time Out Time In: 4349 Time Out: 1030 Janina Feliz, HARISH

## 2018-11-30 NOTE — PROGRESS NOTES
Hospitalist Progress Note 2018 Admit Date: 2018 10:58 AM  
NAME: Melanie Chaudhary :  1975 MRN:  [id number] Attending: Ez Patton DO 
PCP:  None SUBJECTIVE:  
Melanie Chaudhary is a 43 yo M with heroin abuse who was admitted with R thigh abscess. He is s/p surgical debridement on  and now with wound vac in place. He reports pain currently controlled but he is still requiring IV pain control. Blood and wound cultures negative. Was on vanc and zosyn initially; now on zosyn only per ID. Hgb dropped to 7 and given 1 unit pRBC on  with improvement to 8.5. 
 
- seen with wound care RN at bedside. Large elliptical wound on thigh with muscle exposed. He reports pain at times; still requiring IV dilaudid. Review of Systems negative with exception of pertinent positives noted above PHYSICAL EXAM  
 
Visit Vitals /87 Pulse 76 Temp 98.2 °F (36.8 °C) Resp 18 Ht 6' (1.829 m) Wt 68 kg (150 lb) SpO2 99% BMI 20.34 kg/m² Temp (24hrs), Av.9 °F (37.2 °C), Min:98 °F (36.7 °C), Max:99.8 °F (37.7 °C) Patient Vitals for the past 24 hrs: 
 Temp Pulse Resp BP SpO2  
18 0749 98.2 °F (36.8 °C) 76 18 139/87 99 % 18 0232 98 °F (36.7 °C) 74 18 133/86 99 % 18 2237 99.4 °F (37.4 °C) 74 18 131/77 100 % 18 1924 99.8 °F (37.7 °C) 85 18 133/79 99 % 18 1815 98.6 °F (37 °C) 88 18 151/51   
18 1714 99.2 °F (37.3 °C) 86 17 137/82 100 % 18 1650 98.6 °F (37 °C) 91 16 150/78   
18 1549 98.8 °F (37.1 °C) 91 16 150/85 100 % 18 1516 99.3 °F (37.4 °C) 86 18 150/89 100 % Oxygen Therapy O2 Sat (%): 99 % (18 0749) Pulse via Oximetry: 82 beats per minute (18 1340) O2 Device: Room air (18 0232) Intake/Output Summary (Last 24 hours) at 2018 1114 Last data filed at 2018 4373 Gross per 24 hour Intake 2842 ml Output 2125 ml Net 717 ml  
  
 General: No acute distress   
Lungs:  CTA Bilaterally. Heart:  Regular rate and rhythm,  No murmur, rub, or gallop Abdomen: Soft, Non distended, Non tender, Positive bowel sounds Extremities: No cyanosis, clubbing or edema, wound vac in place to R thigh Neurologic:  No focal deficits Recent Results (from the past 24 hour(s)) TYPE + CROSSMATCH Collection Time: 11/29/18  1:24 PM  
Result Value Ref Range Crossmatch Expiration 12/02/2018 ABO/Rh(D) A POSITIVE Antibody screen NEG Unit number [id number] Blood component type RC LR Unit division 00 Status of unit TRANSFUSED Crossmatch result Compatible METABOLIC PANEL, BASIC Collection Time: 11/30/18  4:46 AM  
Result Value Ref Range Sodium 138 136 - 145 mmol/L Potassium 3.2 (L) 3.5 - 5.1 mmol/L Chloride 105 98 - 107 mmol/L  
 CO2 27 21 - 32 mmol/L Anion gap 6 (L) 7 - 16 mmol/L Glucose 92 65 - 100 mg/dL BUN 9 6 - 23 MG/DL Creatinine 0.85 0.8 - 1.5 MG/DL  
 GFR est AA >60 >60 ml/min/1.73m2 GFR est non-AA >60 >60 ml/min/1.73m2 Calcium 8.5 8.3 - 10.4 MG/DL  
HEMOGLOBIN Collection Time: 11/30/18  4:46 AM  
Result Value Ref Range HGB 8.5 (L) 13.6 - 17.2 g/dL All Micro Results Procedure Component Value Units Date/Time John Coles [[id number]] Collected:  11/25/18 1220 Order Status:  Completed Specimen:  Abscess Updated:  11/30/18 2420 Special Requests: --     
  RIGHT THIGH Culture result:    
  NO ANAEROBES ISOLATED 5 DAYS  
     
 CULTURE, BLOOD [[id number]] Collected:  11/24/18 1351 Order Status:  Completed Specimen:  Whole Blood Updated:  11/29/18 0730 Special Requests: --     
  RIGHT JUGULAR VEIN Culture result: NO GROWTH 5 DAYS     
 CULTURE, BLOOD [[id number]] Collected:  11/24/18 1400 Order Status:  Completed Specimen:  Blood Updated:  11/29/18 0730 Special Requests: NO SPECIAL REQUESTS Culture result: NO GROWTH 5 DAYS MSSA/MRSA SC BY PCR, NASAL SWAB [[id number]]  (Abnormal) Collected:  11/27/18 1222 Order Status:  Completed Specimen:  Nasal Updated:  11/27/18 1428 Special Requests: NO SPECIAL REQUESTS Culture result: MRSA target DNA not detected, SA target DNA detected. A MRSA negative, SA positive test result does not preclude MRSA nasal colonization. Kimberly Peto STAIN [[id number]] Collected:  11/25/18 1220 Order Status:  Completed Specimen:  Wound from Abscess Updated:  11/27/18 9570 Special Requests: --     
  RIGHT THIGH 
  
  GRAM STAIN NO DEFINITE ORGANISM SEEN     
   20 TO 30 WBC'S PER OIF Culture result: NO GROWTH 2 DAYS Imaging:   
CT HIP RT W CONT Final Result IMPRESSION:    
1. Abscess collections within the right lateral hip and proximal thigh soft  
tissues as described above. ASSESSMENT Hospital Problems as of 11/30/2018 Never Reviewed Codes Class Noted - Resolved POA Anemia ICD-10-CM: D64.9 ICD-9-CM: 285.9  11/29/2018 - Present Yes Polysubstance abuse ([facility] ICD-10-CM: F19.10 ICD-9-CM: 305.90  11/28/2018 - Present Yes Other insomnia ICD-10-CM: G47.09 
ICD-9-CM: 780.52  11/27/2018 - Present Unknown * (Principal) Necrotizing fasciitis ([facility] ICD-10-CM: M72.6 ICD-9-CM: 728.86  11/26/2018 - Present Unknown Abscess of thigh ICD-10-CM: L02.419 ICD-9-CM: 682.6  11/24/2018 - Present Unknown Plan: · R thigh abscess/necrotizing fasciitis- s/p excisional debridement of R thigh on 11/25. · Continue zosyn per ID recs (likely 2 weeks, then PO abx for total of 4 weeks of leg heals well). · Vanco discontinued 11/28. · Wound vac. · Pain control. · Anemia- s/p 1 unit pRBC with hgb 7 to 8.5. · Insomnia- improved with Restoril; continue. · Polysubstance abuse- counseled. DVT Prophylaxis: SCDs Signed By: Juli Marx MD   
 November 30, 2018

## 2018-11-30 NOTE — WOUND CARE
Pt seen for wound vac dressing change. Primary RN pre medicated with pain medication for dressing change. Old wound vac dressing removed, Wound present to right thigh with muscle, tendon, and fascia exposed. Cleansed with normal saline, moderate amount of serousanguinous drainage noted. Wound bed is pink with blackened area at 12 o'clock. 2 pieced of foam used. Connected to wound vac machine, no leaks noted. Pt tolerated fairly. Will continue with MWF dressing changes as order. Will continue to follow.

## 2018-12-01 LAB
ANION GAP SERPL CALC-SCNC: 9 MMOL/L (ref 7–16)
BUN SERPL-MCNC: 11 MG/DL (ref 6–23)
CALCIUM SERPL-MCNC: 8.6 MG/DL (ref 8.3–10.4)
CHLORIDE SERPL-SCNC: 107 MMOL/L (ref 98–107)
CO2 SERPL-SCNC: 26 MMOL/L (ref 21–32)
CREAT SERPL-MCNC: 0.88 MG/DL (ref 0.8–1.5)
GLUCOSE SERPL-MCNC: 98 MG/DL (ref 65–100)
HGB BLD-MCNC: 8.5 G/DL (ref 13.6–17.2)
POTASSIUM SERPL-SCNC: 3.4 MMOL/L (ref 3.5–5.1)
SODIUM SERPL-SCNC: 142 MMOL/L (ref 136–145)

## 2018-12-01 PROCEDURE — 65270000029 HC RM PRIVATE

## 2018-12-01 PROCEDURE — 74011250636 HC RX REV CODE- 250/636: Performed by: INTERNAL MEDICINE

## 2018-12-01 PROCEDURE — 74011250637 HC RX REV CODE- 250/637: Performed by: INTERNAL MEDICINE

## 2018-12-01 PROCEDURE — 74750000023 HC WOUND THERAPY

## 2018-12-01 PROCEDURE — 80048 BASIC METABOLIC PNL TOTAL CA: CPT

## 2018-12-01 PROCEDURE — 74011000258 HC RX REV CODE- 258: Performed by: INTERNAL MEDICINE

## 2018-12-01 PROCEDURE — 74011250637 HC RX REV CODE- 250/637: Performed by: NURSE PRACTITIONER

## 2018-12-01 PROCEDURE — 85018 HEMOGLOBIN: CPT

## 2018-12-01 PROCEDURE — 74011250637 HC RX REV CODE- 250/637: Performed by: SURGERY

## 2018-12-01 RX ORDER — HYDROCODONE BITARTRATE AND ACETAMINOPHEN 5; 325 MG/1; MG/1
1 TABLET ORAL
Status: DISCONTINUED | OUTPATIENT
Start: 2018-12-01 | End: 2018-12-07

## 2018-12-01 RX ORDER — HYDROCODONE BITARTRATE AND ACETAMINOPHEN 10; 325 MG/1; MG/1
1 TABLET ORAL
Status: DISCONTINUED | OUTPATIENT
Start: 2018-12-01 | End: 2018-12-07

## 2018-12-01 RX ORDER — HYDROMORPHONE HYDROCHLORIDE 1 MG/ML
0.5 INJECTION, SOLUTION INTRAMUSCULAR; INTRAVENOUS; SUBCUTANEOUS
Status: DISCONTINUED | OUTPATIENT
Start: 2018-12-01 | End: 2018-12-01

## 2018-12-01 RX ORDER — HYDROMORPHONE HYDROCHLORIDE 1 MG/ML
0.5 INJECTION, SOLUTION INTRAMUSCULAR; INTRAVENOUS; SUBCUTANEOUS
Status: DISCONTINUED | OUTPATIENT
Start: 2018-12-01 | End: 2018-12-04

## 2018-12-01 RX ORDER — POTASSIUM CHLORIDE 20 MEQ/1
40 TABLET, EXTENDED RELEASE ORAL
Status: COMPLETED | OUTPATIENT
Start: 2018-12-01 | End: 2018-12-01

## 2018-12-01 RX ADMIN — HYDROMORPHONE HYDROCHLORIDE 0.5 MG: 1 INJECTION, SOLUTION INTRAMUSCULAR; INTRAVENOUS; SUBCUTANEOUS at 16:31

## 2018-12-01 RX ADMIN — HYDROMORPHONE HYDROCHLORIDE 1 MG: 1 INJECTION, SOLUTION INTRAMUSCULAR; INTRAVENOUS; SUBCUTANEOUS at 08:49

## 2018-12-01 RX ADMIN — Medication 5 ML: at 20:14

## 2018-12-01 RX ADMIN — HYDROMORPHONE HYDROCHLORIDE 1 MG: 1 INJECTION, SOLUTION INTRAMUSCULAR; INTRAVENOUS; SUBCUTANEOUS at 04:24

## 2018-12-01 RX ADMIN — PIPERACILLIN SODIUM,TAZOBACTAM SODIUM 3.38 G: 3; .375 INJECTION, POWDER, FOR SOLUTION INTRAVENOUS at 01:02

## 2018-12-01 RX ADMIN — PIPERACILLIN SODIUM,TAZOBACTAM SODIUM 3.38 G: 3; .375 INJECTION, POWDER, FOR SOLUTION INTRAVENOUS at 10:26

## 2018-12-01 RX ADMIN — OXYCODONE HYDROCHLORIDE AND ACETAMINOPHEN 1 TABLET: 7.5; 325 TABLET ORAL at 06:09

## 2018-12-01 RX ADMIN — HYDROCODONE BITARTRATE AND ACETAMINOPHEN 1 TABLET: 10; 325 TABLET ORAL at 19:19

## 2018-12-01 RX ADMIN — TEMAZEPAM 15 MG: 15 CAPSULE ORAL at 22:13

## 2018-12-01 RX ADMIN — OXYCODONE HYDROCHLORIDE AND ACETAMINOPHEN 1 TABLET: 7.5; 325 TABLET ORAL at 01:02

## 2018-12-01 RX ADMIN — HYDROCODONE BITARTRATE AND ACETAMINOPHEN 1 TABLET: 10; 325 TABLET ORAL at 15:23

## 2018-12-01 RX ADMIN — HYDROMORPHONE HYDROCHLORIDE 0.5 MG: 1 INJECTION, SOLUTION INTRAMUSCULAR; INTRAVENOUS; SUBCUTANEOUS at 12:41

## 2018-12-01 RX ADMIN — Medication 5 ML: at 15:24

## 2018-12-01 RX ADMIN — HYDROMORPHONE HYDROCHLORIDE 0.5 MG: 1 INJECTION, SOLUTION INTRAMUSCULAR; INTRAVENOUS; SUBCUTANEOUS at 20:14

## 2018-12-01 RX ADMIN — PIPERACILLIN SODIUM,TAZOBACTAM SODIUM 3.38 G: 3; .375 INJECTION, POWDER, FOR SOLUTION INTRAVENOUS at 18:02

## 2018-12-01 RX ADMIN — Medication 10 ML: at 05:47

## 2018-12-01 RX ADMIN — OXYCODONE HYDROCHLORIDE AND ACETAMINOPHEN 1 TABLET: 7.5; 325 TABLET ORAL at 11:31

## 2018-12-01 RX ADMIN — POTASSIUM CHLORIDE 40 MEQ: 20 TABLET, EXTENDED RELEASE ORAL at 12:41

## 2018-12-01 NOTE — PROGRESS NOTES
Hospitalist Progress Note   
2018 Admit Date: 2018 10:58 AM  
NAME: Tone Pina :  1975 MRN:  [id number] Attending: Tao Sheppard DO 
PCP:  None SUBJECTIVE:  
Tone Pina is a 43 yo M with heroin abuse who was admitted with R thigh abscess. He is s/p surgical debridement on  with wound vac in place. Blood and wound cultures negative. Was on vanc and zosyn initially; now on zosyn only per ID. Hgb dropped to 7 and given 1 unit pRBC on  with improvement to 8.5. Plan is for IV abx until  followed by PO. Self pay so will complete IV course here.  - sleeping comfortably when I entered. No complaints currently. Did ambulate in hallway earlier. No CP, SOB PHYSICAL EXAM  
 
Visit Vitals /86 (BP 1 Location: Right arm, BP Patient Position: At rest) Pulse 60 Temp 98.2 °F (36.8 °C) Resp 18 Ht 6' (1.829 m) Wt 68 kg (150 lb) SpO2 97% BMI 20.34 kg/m² Temp (24hrs), Av.4 °F (36.9 °C), Min:98.2 °F (36.8 °C), Max:98.9 °F (37.2 °C) Patient Vitals for the past 24 hrs: 
 Temp Pulse Resp BP SpO2  
18 0658 98.2 °F (36.8 °C) 60 18 142/86 97 % 18 0421 98.3 °F (36.8 °C) 72 18 (!) 158/92 100 % 18 0001 98.3 °F (36.8 °C) 79 18 150/85 93 % 18 2000 98.9 °F (37.2 °C) 79 18 147/88 99 % 18 1523 98.5 °F (36.9 °C) 87 19 143/79 99 % Oxygen Therapy O2 Sat (%): 97 % (18 0658) Pulse via Oximetry: 82 beats per minute (18 1340) O2 Device: Room air (18 0232) Intake/Output Summary (Last 24 hours) at 2018 1200 Last data filed at 2018 7791 Gross per 24 hour Intake 602 ml Output 1325 ml Net -723 ml General: No acute distress   
Extremities: No cyanosis, clubbing or edema, wound vac in place to R thigh Neurologic:  No focal deficits Recent Results (from the past 24 hour(s)) METABOLIC PANEL, BASIC Collection Time: 18  4:28 AM  
Result Value Ref Range Sodium 142 136 - 145 mmol/L Potassium 3.4 (L) 3.5 - 5.1 mmol/L Chloride 107 98 - 107 mmol/L  
 CO2 26 21 - 32 mmol/L Anion gap 9 7 - 16 mmol/L Glucose 98 65 - 100 mg/dL BUN 11 6 - 23 MG/DL Creatinine 0.88 0.8 - 1.5 MG/DL  
 GFR est AA >60 >60 ml/min/1.73m2 GFR est non-AA >60 >60 ml/min/1.73m2 Calcium 8.6 8.3 - 10.4 MG/DL  
HEMOGLOBIN Collection Time: 12/01/18  4:28 AM  
Result Value Ref Range HGB 8.5 (L) 13.6 - 17.2 g/dL All Micro Results Procedure Component Value Units Date/Time Chi Apple [[id number]] Collected:  11/25/18 1220 Order Status:  Completed Specimen:  Abscess Updated:  11/30/18 8208 Special Requests: --     
  RIGHT THIGH Culture result:    
  NO ANAEROBES ISOLATED 5 DAYS  
     
 CULTURE, BLOOD [[id number]] Collected:  11/24/18 1351 Order Status:  Completed Specimen:  Whole Blood Updated:  11/29/18 0730 Special Requests: --     
  RIGHT JUGULAR VEIN Culture result: NO GROWTH 5 DAYS     
 CULTURE, BLOOD [[id number]] Collected:  11/24/18 1400 Order Status:  Completed Specimen:  Blood Updated:  11/29/18 0730 Special Requests: NO SPECIAL REQUESTS Culture result: NO GROWTH 5 DAYS     
 MSSA/MRSA SC BY PCR, NASAL SWAB [[id number]]  (Abnormal) Collected:  11/27/18 1222 Order Status:  Completed Specimen:  Nasal Updated:  11/27/18 1428 Special Requests: NO SPECIAL REQUESTS Culture result: MRSA target DNA not detected, SA target DNA detected. A MRSA negative, SA positive test result does not preclude MRSA nasal colonization. Catheline Peyer STAIN [[id number]] Collected:  11/25/18 1220 Order Status:  Completed Specimen:  Wound from Abscess Updated:  11/27/18 1225 Special Requests: --     
  RIGHT THIGH 
  
  GRAM STAIN NO DEFINITE ORGANISM SEEN     
   20 TO 30 WBC'S PER OIF Culture result: NO GROWTH 2 DAYS Imaging last 24 hours:   
No results found. ASSESSMENT Hospital Problems as of 12/1/2018 Never Reviewed Codes Class Noted - Resolved POA Anemia ICD-10-CM: D64.9 ICD-9-CM: 285.9  11/29/2018 - Present Yes Polysubstance abuse ([facility] ICD-10-CM: F19.10 ICD-9-CM: 305.90  11/28/2018 - Present Yes Other insomnia ICD-10-CM: G47.09 
ICD-9-CM: 780.52  11/27/2018 - Present Yes * (Principal) Necrotizing fasciitis ([facility] ICD-10-CM: M72.6 ICD-9-CM: 728.86  11/26/2018 - Present Yes Abscess of thigh ICD-10-CM: L02.419 ICD-9-CM: 682.6  11/24/2018 - Present Yes Plan: · R thigh abscess/necrotizing fasciitis-  
· s/p excisional debridement of R thigh on 11/25. · Continue zosyn per ID recs (likely 2 weeks EOT 12/14, then PO abx for total of 4 weeks). · Wound vac. · Wean off IV narcotics · Anemia-  
· s/p 1 unit pRBC with hgb 7 to 8.5.    
· monitor · Insomnia-  
· improved with Restoril; continue. · Polysubstance abuse-  
· Need to wean off IV narcotics DVT Prophylaxis: SCDs Signed By: Margaux Vigil MD   
 December 1, 2018

## 2018-12-01 NOTE — PROGRESS NOTES
Problem: Falls - Risk of 
Goal: *Absence of Falls Document April Jorge Fall Risk and appropriate interventions in the flowsheet. Outcome: Progressing Towards Goal 
Fall Risk Interventions: 
Mobility Interventions: Patient to call before getting OOB Medication Interventions: Teach patient to arise slowly Elimination Interventions: Call light in reach History of Falls Interventions: Evaluate medications/consider consulting pharmacy Problem: Pressure Injury - Risk of 
Goal: *Prevention of pressure injury Document Tray Scale and appropriate interventions in the flowsheet. Outcome: Progressing Towards Goal 
Pressure Injury Interventions: 
Sensory Interventions: Check visual cues for pain Moisture Interventions: Absorbent underpads Activity Interventions: Increase time out of bed, Pressure redistribution bed/mattress(bed type) Mobility Interventions: Pressure redistribution bed/mattress (bed type) Nutrition Interventions: Document food/fluid/supplement intake

## 2018-12-02 LAB
ANION GAP SERPL CALC-SCNC: 9 MMOL/L (ref 7–16)
BUN SERPL-MCNC: 10 MG/DL (ref 6–23)
CALCIUM SERPL-MCNC: 9 MG/DL (ref 8.3–10.4)
CHLORIDE SERPL-SCNC: 103 MMOL/L (ref 98–107)
CO2 SERPL-SCNC: 28 MMOL/L (ref 21–32)
CREAT SERPL-MCNC: 0.81 MG/DL (ref 0.8–1.5)
GLUCOSE SERPL-MCNC: 91 MG/DL (ref 65–100)
HCT VFR BLD AUTO: 28.7 % (ref 41.1–50.3)
HGB BLD-MCNC: 8.9 G/DL (ref 13.6–17.2)
POTASSIUM SERPL-SCNC: 3.2 MMOL/L (ref 3.5–5.1)
SODIUM SERPL-SCNC: 140 MMOL/L (ref 136–145)

## 2018-12-02 PROCEDURE — 74011000258 HC RX REV CODE- 258: Performed by: INTERNAL MEDICINE

## 2018-12-02 PROCEDURE — 85018 HEMOGLOBIN: CPT

## 2018-12-02 PROCEDURE — 74011250636 HC RX REV CODE- 250/636: Performed by: INTERNAL MEDICINE

## 2018-12-02 PROCEDURE — 74750000023 HC WOUND THERAPY

## 2018-12-02 PROCEDURE — 77030020263 HC SOL INJ SOD CL0.9% LFCR 1000ML

## 2018-12-02 PROCEDURE — 36592 COLLECT BLOOD FROM PICC: CPT

## 2018-12-02 PROCEDURE — 74011250637 HC RX REV CODE- 250/637: Performed by: INTERNAL MEDICINE

## 2018-12-02 PROCEDURE — 74011250637 HC RX REV CODE- 250/637: Performed by: SURGERY

## 2018-12-02 PROCEDURE — 80048 BASIC METABOLIC PNL TOTAL CA: CPT

## 2018-12-02 PROCEDURE — 65270000029 HC RM PRIVATE

## 2018-12-02 RX ORDER — POTASSIUM CHLORIDE 20 MEQ/1
40 TABLET, EXTENDED RELEASE ORAL 2 TIMES DAILY
Status: COMPLETED | OUTPATIENT
Start: 2018-12-02 | End: 2018-12-02

## 2018-12-02 RX ADMIN — HYDROMORPHONE HYDROCHLORIDE 0.5 MG: 1 INJECTION, SOLUTION INTRAMUSCULAR; INTRAVENOUS; SUBCUTANEOUS at 05:10

## 2018-12-02 RX ADMIN — HYDROMORPHONE HYDROCHLORIDE 0.5 MG: 1 INJECTION, SOLUTION INTRAMUSCULAR; INTRAVENOUS; SUBCUTANEOUS at 14:59

## 2018-12-02 RX ADMIN — HYDROCODONE BITARTRATE AND ACETAMINOPHEN 1 TABLET: 10; 325 TABLET ORAL at 13:30

## 2018-12-02 RX ADMIN — HYDROCODONE BITARTRATE AND ACETAMINOPHEN 1 TABLET: 10; 325 TABLET ORAL at 08:57

## 2018-12-02 RX ADMIN — PIPERACILLIN SODIUM,TAZOBACTAM SODIUM 3.38 G: 3; .375 INJECTION, POWDER, FOR SOLUTION INTRAVENOUS at 17:42

## 2018-12-02 RX ADMIN — HYDROCODONE BITARTRATE AND ACETAMINOPHEN 1 TABLET: 10; 325 TABLET ORAL at 04:10

## 2018-12-02 RX ADMIN — POTASSIUM CHLORIDE 40 MEQ: 20 TABLET, EXTENDED RELEASE ORAL at 13:30

## 2018-12-02 RX ADMIN — PIPERACILLIN SODIUM,TAZOBACTAM SODIUM 3.38 G: 3; .375 INJECTION, POWDER, FOR SOLUTION INTRAVENOUS at 03:09

## 2018-12-02 RX ADMIN — HYDROMORPHONE HYDROCHLORIDE 0.5 MG: 1 INJECTION, SOLUTION INTRAMUSCULAR; INTRAVENOUS; SUBCUTANEOUS at 00:09

## 2018-12-02 RX ADMIN — POTASSIUM CHLORIDE 40 MEQ: 20 TABLET, EXTENDED RELEASE ORAL at 17:41

## 2018-12-02 RX ADMIN — HYDROMORPHONE HYDROCHLORIDE 0.5 MG: 1 INJECTION, SOLUTION INTRAMUSCULAR; INTRAVENOUS; SUBCUTANEOUS at 23:19

## 2018-12-02 RX ADMIN — HYDROCODONE BITARTRATE AND ACETAMINOPHEN 1 TABLET: 10; 325 TABLET ORAL at 17:41

## 2018-12-02 RX ADMIN — PIPERACILLIN SODIUM,TAZOBACTAM SODIUM 3.38 G: 3; .375 INJECTION, POWDER, FOR SOLUTION INTRAVENOUS at 10:32

## 2018-12-02 RX ADMIN — HYDROCODONE BITARTRATE AND ACETAMINOPHEN 1 TABLET: 10; 325 TABLET ORAL at 21:41

## 2018-12-02 RX ADMIN — HYDROMORPHONE HYDROCHLORIDE 0.5 MG: 1 INJECTION, SOLUTION INTRAMUSCULAR; INTRAVENOUS; SUBCUTANEOUS at 19:21

## 2018-12-02 RX ADMIN — HYDROMORPHONE HYDROCHLORIDE 0.5 MG: 1 INJECTION, SOLUTION INTRAMUSCULAR; INTRAVENOUS; SUBCUTANEOUS at 10:31

## 2018-12-02 NOTE — PROGRESS NOTES
Bedside and Verbal shift change report given to Noemy Lamb RN. Report included the following information SBAR, Kardex, MAR, Accordion and Recent Results.

## 2018-12-02 NOTE — PROGRESS NOTES
Problem: Falls - Risk of 
Goal: *Absence of Falls Document Aren Shadow Fall Risk and appropriate interventions in the flowsheet. Outcome: Progressing Towards Goal 
Fall Risk Interventions: 
Mobility Interventions: Patient to call before getting OOB Medication Interventions: Evaluate medications/consider consulting pharmacy, Patient to call before getting OOB, Teach patient to arise slowly Elimination Interventions: Call light in reach, Patient to call for help with toileting needs, Toilet paper/wipes in reach, Toileting schedule/hourly rounds, Urinal in reach History of Falls Interventions: Evaluate medications/consider consulting pharmacy Problem: Pressure Injury - Risk of 
Goal: *Prevention of pressure injury Document Tray Scale and appropriate interventions in the flowsheet. Outcome: Progressing Towards Goal 
Pressure Injury Interventions: 
Sensory Interventions: Check visual cues for pain Moisture Interventions: Absorbent underpads Activity Interventions: Increase time out of bed, Pressure redistribution bed/mattress(bed type) Mobility Interventions: Pressure redistribution bed/mattress (bed type) Nutrition Interventions: Document food/fluid/supplement intake

## 2018-12-02 NOTE — PROGRESS NOTES
Hospitalist Progress Note   
2018 Admit Date: 2018 10:58 AM  
NAME: Jewell Samuel :  1975 MRN:  [id number] Attending: Chana Cotter DO 
PCP:  None SUBJECTIVE:  
Jewell Samuel is a 45 yo M with heroin abuse who was admitted with R thigh abscess. He is s/p surgical debridement on  with wound vac in place. Blood and wound cultures negative. Was on vanc and zosyn initially; now on zosyn only per ID. Hgb dropped to 7 and given 1 unit pRBC on  with improvement to 8.5. Plan is for IV abx until  followed by PO. Self pay so will complete IV course here.  - sleeping comfortably when I entered. No complaints currently. Did ambulate in hallway earlier. No CP, SOB 
 
 - says he has some R knee pain, mild today. No overt abnormalities on exam other than swelling that is not new; related to infection/surgery. not worse with movement. No CP, SOB PHYSICAL EXAM  
 
Visit Vitals /89 Pulse 76 Temp 98.7 °F (37.1 °C) Resp 18 Ht 6' (1.829 m) Wt 68 kg (150 lb) SpO2 99% BMI 20.34 kg/m² Temp (24hrs), Av.5 °F (36.9 °C), Min:98.3 °F (36.8 °C), Max:98.8 °F (37.1 °C) Patient Vitals for the past 24 hrs: 
 Temp Pulse Resp BP SpO2  
18 0630  76  135/89   
18 0416 98.7 °F (37.1 °C) 90 18 (!) 147/94 99 % 18 2355 98.3 °F (36.8 °C) 75 18 146/90 100 % 18 1919 98.8 °F (37.1 °C) 82 18 136/84 98 % 18 1446 98.3 °F (36.8 °C) 78 18 136/80 99 % Oxygen Therapy O2 Sat (%): 99 % (18 0416) Pulse via Oximetry: 82 beats per minute (18 1340) O2 Device: Room air (18 0232) Intake/Output Summary (Last 24 hours) at 2018 1139 Last data filed at 2018 0630 Gross per 24 hour Intake 694 ml Output 1100 ml Net -406 ml General: No acute distress   
Extremities: No cyanosis, clubbing or edema, wound vac in place to R thigh. R knee NTTP, mobile without pain. No erythema Neurologic:  No focal deficits Recent Results (from the past 24 hour(s)) METABOLIC PANEL, BASIC Collection Time: 12/02/18  4:19 AM  
Result Value Ref Range Sodium 140 136 - 145 mmol/L Potassium 3.2 (L) 3.5 - 5.1 mmol/L Chloride 103 98 - 107 mmol/L  
 CO2 28 21 - 32 mmol/L Anion gap 9 7 - 16 mmol/L Glucose 91 65 - 100 mg/dL BUN 10 6 - 23 MG/DL Creatinine 0.81 0.8 - 1.5 MG/DL  
 GFR est AA >60 >60 ml/min/1.73m2 GFR est non-AA >60 >60 ml/min/1.73m2 Calcium 9.0 8.3 - 10.4 MG/DL  
HGB & HCT Collection Time: 12/02/18  4:19 AM  
Result Value Ref Range HGB 8.9 (L) 13.6 - 17.2 g/dL HCT 28.7 (L) 41.1 - 50.3 % All Micro Results Procedure Component Value Units Date/Time Rusty Iglesias [[id number]] Collected:  11/25/18 1220 Order Status:  Completed Specimen:  Abscess Updated:  11/30/18 6454 Special Requests: --     
  RIGHT THIGH Culture result:    
  NO ANAEROBES ISOLATED 5 DAYS  
     
 CULTURE, BLOOD [[id number]] Collected:  11/24/18 1351 Order Status:  Completed Specimen:  Whole Blood Updated:  11/29/18 0730 Special Requests: --     
  RIGHT JUGULAR VEIN Culture result: NO GROWTH 5 DAYS     
 CULTURE, BLOOD [[id number]] Collected:  11/24/18 1400 Order Status:  Completed Specimen:  Blood Updated:  11/29/18 0730 Special Requests: NO SPECIAL REQUESTS Culture result: NO GROWTH 5 DAYS     
 MSSA/MRSA SC BY PCR, NASAL SWAB [[id number]]  (Abnormal) Collected:  11/27/18 1222 Order Status:  Completed Specimen:  Nasal Updated:  11/27/18 1428 Special Requests: NO SPECIAL REQUESTS Culture result: MRSA target DNA not detected, SA target DNA detected. A MRSA negative, SA positive test result does not preclude MRSA nasal colonization. Delmy Pellet STAIN [[id number]] Collected:  11/25/18 1220 Order Status:  Completed Specimen:  Wound from Abscess Updated:  11/27/18 8290   Special Requests: --     
 RIGHT THIGH 
  
  GRAM STAIN NO DEFINITE ORGANISM SEEN     
   20 TO 30 WBC'S PER OIF Culture result: NO GROWTH 2 DAYS Imaging last 24 hours:   
No results found. ASSESSMENT Hospital Problems as of 12/2/2018 Never Reviewed Codes Class Noted - Resolved POA Anemia ICD-10-CM: D64.9 ICD-9-CM: 285.9  11/29/2018 - Present Yes Polysubstance abuse ([facility] ICD-10-CM: F19.10 ICD-9-CM: 305.90  11/28/2018 - Present Yes Other insomnia ICD-10-CM: G47.09 
ICD-9-CM: 780.52  11/27/2018 - Present Yes * (Principal) Necrotizing fasciitis ([facility] ICD-10-CM: M72.6 ICD-9-CM: 728.86  11/26/2018 - Present Yes Abscess of thigh ICD-10-CM: L02.419 ICD-9-CM: 682.6  11/24/2018 - Present Yes Plan: · R thigh abscess/necrotizing fasciitis-  
· s/p excisional debridement of R thigh on 11/25. · Continue zosyn per ID recs (likely 2 weeks EOT 12/14, then PO abx for total of 4 weeks). · Wound vac. · Wean off IV narcotics · Anemia-  
· s/p 1 unit pRBC · monitor · Insomnia-  
· improved with Restoril; continue. · Polysubstance abuse-  
· Need to wean off IV narcotics DVT Prophylaxis: SCDs Signed By: Peter Conway MD   
 December 2, 2018

## 2018-12-02 NOTE — PROGRESS NOTES
END OF SHIFT NOTE: 
 
INTAKE/OUTPUT 
11/30 0701 - 12/01 0700 In: 3054 [I.V.:2724] Out: 1850 [ZBI:1361] Voiding: YES Catheter: YES Drain:  
Vacuum Assisted Closure Anterior;Right;Upper Leg (Active) Vac Status Suction, continuous 11/30/2018  2:36 PM  
Suction (mmHg) 125 11/30/2018  2:36 PM  
Site Assessment Clean, dry, & intact 11/30/2018  7:58 AM  
Dressing Status Clean, dry, & intact 11/30/2018  2:36 PM  
Drainage Description Serosanguinous 11/30/2018  2:36 PM  
Drainage Chamber Level (ml) 350 ml 11/30/2018  7:58 AM  
Output (ml) 75 ml 11/30/2018  6:20 AM  
 
 
 
 
 
 
Flatus: Patient does have flatus present. Stool:  0 occurrences. Characteristics: 
Stool Assessment Stool Appearance: Loose Emesis: 0 occurrences. Characteristics: VITAL SIGNS Patient Vitals for the past 12 hrs: 
 Temp Pulse Resp BP SpO2  
12/01/18 1919 98.8 °F (37.1 °C) 82 18 136/84 98 % 12/01/18 1446 98.3 °F (36.8 °C) 78 18 136/80 99 % 12/01/18 1030 98.5 °F (36.9 °C) 77 18 132/84 99 % Pain Assessment Pain Intensity 1: 8 (12/01/18 1919) Pain Location 1: Leg 
Pain Intervention(s) 1: Medication (see MAR) Patient Stated Pain Goal: 0 Ambulating No 
 
Shift report given to oncoming nurse at the bedside.  
 
Selestino Goldmann, RN

## 2018-12-03 PROBLEM — F19.10 POLYSUBSTANCE ABUSE (HCC): Chronic | Status: ACTIVE | Noted: 2018-11-28

## 2018-12-03 PROBLEM — D62 ACUTE BLOOD LOSS ANEMIA: Status: RESOLVED | Noted: 2018-11-29 | Resolved: 2018-12-03

## 2018-12-03 PROBLEM — G47.09 OTHER INSOMNIA: Chronic | Status: ACTIVE | Noted: 2018-11-27

## 2018-12-03 PROBLEM — D62 ACUTE BLOOD LOSS ANEMIA: Status: ACTIVE | Noted: 2018-11-29

## 2018-12-03 PROBLEM — M72.6 NECROTIZING FASCIITIS (HCC): Chronic | Status: ACTIVE | Noted: 2018-11-26

## 2018-12-03 PROBLEM — L02.419 ABSCESS OF THIGH: Status: RESOLVED | Noted: 2018-11-24 | Resolved: 2018-12-03

## 2018-12-03 LAB
BACTERIA SPEC CULT: NORMAL
HCT VFR BLD AUTO: 26.9 % (ref 41.1–50.3)
HGB BLD-MCNC: 8.5 G/DL (ref 13.6–17.2)
POTASSIUM SERPL-SCNC: 3.8 MMOL/L (ref 3.5–5.1)
SERVICE CMNT-IMP: NORMAL

## 2018-12-03 PROCEDURE — 84132 ASSAY OF SERUM POTASSIUM: CPT

## 2018-12-03 PROCEDURE — 74750000023 HC WOUND THERAPY

## 2018-12-03 PROCEDURE — 97116 GAIT TRAINING THERAPY: CPT

## 2018-12-03 PROCEDURE — 85018 HEMOGLOBIN: CPT

## 2018-12-03 PROCEDURE — 97110 THERAPEUTIC EXERCISES: CPT

## 2018-12-03 PROCEDURE — 74011250636 HC RX REV CODE- 250/636: Performed by: INTERNAL MEDICINE

## 2018-12-03 PROCEDURE — 65270000029 HC RM PRIVATE

## 2018-12-03 PROCEDURE — 97605 NEG PRS WND THER DME<=50SQCM: CPT

## 2018-12-03 PROCEDURE — 74011250637 HC RX REV CODE- 250/637: Performed by: INTERNAL MEDICINE

## 2018-12-03 PROCEDURE — 77030019934 HC DRSG VAC ASST KCON -B

## 2018-12-03 PROCEDURE — 74011250637 HC RX REV CODE- 250/637: Performed by: SURGERY

## 2018-12-03 PROCEDURE — 74011000258 HC RX REV CODE- 258: Performed by: INTERNAL MEDICINE

## 2018-12-03 RX ADMIN — PIPERACILLIN SODIUM,TAZOBACTAM SODIUM 3.38 G: 3; .375 INJECTION, POWDER, FOR SOLUTION INTRAVENOUS at 01:43

## 2018-12-03 RX ADMIN — HYDROCODONE BITARTRATE AND ACETAMINOPHEN 1 TABLET: 10; 325 TABLET ORAL at 19:06

## 2018-12-03 RX ADMIN — HYDROCODONE BITARTRATE AND ACETAMINOPHEN 1 TABLET: 10; 325 TABLET ORAL at 09:37

## 2018-12-03 RX ADMIN — HYDROCODONE BITARTRATE AND ACETAMINOPHEN 1 TABLET: 10; 325 TABLET ORAL at 23:06

## 2018-12-03 RX ADMIN — HYDROCODONE BITARTRATE AND ACETAMINOPHEN 1 TABLET: 10; 325 TABLET ORAL at 01:43

## 2018-12-03 RX ADMIN — Medication 5 ML: at 07:26

## 2018-12-03 RX ADMIN — HYDROMORPHONE HYDROCHLORIDE 0.5 MG: 1 INJECTION, SOLUTION INTRAMUSCULAR; INTRAVENOUS; SUBCUTANEOUS at 16:16

## 2018-12-03 RX ADMIN — HYDROCODONE BITARTRATE AND ACETAMINOPHEN 1 TABLET: 10; 325 TABLET ORAL at 14:35

## 2018-12-03 RX ADMIN — HYDROCODONE BITARTRATE AND ACETAMINOPHEN 1 TABLET: 10; 325 TABLET ORAL at 05:50

## 2018-12-03 RX ADMIN — HYDROMORPHONE HYDROCHLORIDE 0.5 MG: 1 INJECTION, SOLUTION INTRAMUSCULAR; INTRAVENOUS; SUBCUTANEOUS at 03:14

## 2018-12-03 RX ADMIN — PIPERACILLIN SODIUM,TAZOBACTAM SODIUM 3.38 G: 3; .375 INJECTION, POWDER, FOR SOLUTION INTRAVENOUS at 17:45

## 2018-12-03 RX ADMIN — Medication 5 ML: at 14:35

## 2018-12-03 RX ADMIN — PIPERACILLIN SODIUM,TAZOBACTAM SODIUM 3.38 G: 3; .375 INJECTION, POWDER, FOR SOLUTION INTRAVENOUS at 10:00

## 2018-12-03 RX ADMIN — HYDROMORPHONE HYDROCHLORIDE 0.5 MG: 1 INJECTION, SOLUTION INTRAMUSCULAR; INTRAVENOUS; SUBCUTANEOUS at 07:23

## 2018-12-03 RX ADMIN — HYDROMORPHONE HYDROCHLORIDE 0.5 MG: 1 INJECTION, SOLUTION INTRAMUSCULAR; INTRAVENOUS; SUBCUTANEOUS at 11:37

## 2018-12-03 RX ADMIN — HYDROMORPHONE HYDROCHLORIDE 0.5 MG: 1 INJECTION, SOLUTION INTRAMUSCULAR; INTRAVENOUS; SUBCUTANEOUS at 20:01

## 2018-12-03 NOTE — PROGRESS NOTES
Hospitalist Progress Note 12/3/2018 Admit Date: 2018 10:58 AM  
NAME: Vicenta Officer :  1975 MRN:  [id number] Attending: Eulogio Dennis DO 
PCP:  None SUBJECTIVE:  
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 He is s/p surgical debridement on  with wound vac in place. Blood and wound cultures negative. Was on vanc and zosyn initially; now on zosyn only per ID. Hgb dropped to 7 and given 1 unit pRBC on  with improvement to 8.5. Plan is for IV abx until  followed by PO. Self pay so will complete IV course here. 12/3 - pt denies complaints. Feeling good. Pain decreased. Ambulating well. PHYSICAL EXAM  
 
Visit Vitals /88 (BP 1 Location: Right arm, BP Patient Position: At rest) Pulse 79 Temp 98.4 °F (36.9 °C) Resp 18 Ht 6' (1.829 m) Wt 68 kg (150 lb) SpO2 99% BMI 20.34 kg/m² Temp (24hrs), Av.5 °F (36.9 °C), Min:98 °F (36.7 °C), Max:98.9 °F (37.2 °C) Patient Vitals for the past 24 hrs: 
 Temp Pulse Resp BP SpO2  
18 1130 98.4 °F (36.9 °C) 79 18 141/88 99 % 18 0728 98.1 °F (36.7 °C) 73 18 134/81 98 % 18 0330 98 °F (36.7 °C) 79 18 124/83 99 % 18 2359 98.6 °F (37 °C) 81 18 138/89 98 % 18 2030 98.9 °F (37.2 °C) 84 18 128/80 99 % 18 1529 98.7 °F (37.1 °C) 85 18 133/86 98 % Oxygen Therapy O2 Sat (%): 99 % (18 1130) Pulse via Oximetry: 82 beats per minute (18 1340) O2 Device: Room air (18 0232) Intake/Output Summary (Last 24 hours) at 12/3/2018 1233 Last data filed at 12/3/2018 1130 Gross per 24 hour Intake 307 ml Output 700 ml Net -393 ml General: No acute distress   
Extremities: No cyanosis, clubbing or edema, wound vac in place to R thigh. R knee NTTP, mobile without pain. No erythema Neurologic:  No focal deficits Recent Results (from the past 24 hour(s)) HGB & HCT  
 Collection Time: 12/03/18  3:09 AM  
Result Value Ref Range HGB 8.5 (L) 13.6 - 17.2 g/dL HCT 26.9 (L) 41.1 - 50.3 % POTASSIUM Collection Time: 12/03/18  3:09 AM  
Result Value Ref Range Potassium 3.8 3.5 - 5.1 mmol/L All Micro Results Procedure Component Value Units Date/Time Marge Perez [[id number]] Collected:  11/25/18 1220 Order Status:  Completed Specimen:  Abscess Updated:  12/03/18 0104 Special Requests: --     
  RIGHT THIGH Culture result:    
  NO ANAEROBES ISOLATED 7 DAYS  
     
 CULTURE, BLOOD [[id number]] Collected:  11/24/18 1351 Order Status:  Completed Specimen:  Whole Blood Updated:  11/29/18 0730 Special Requests: --     
  RIGHT JUGULAR VEIN Culture result: NO GROWTH 5 DAYS     
 CULTURE, BLOOD [[id number]] Collected:  11/24/18 1400 Order Status:  Completed Specimen:  Blood Updated:  11/29/18 0730 Special Requests: NO SPECIAL REQUESTS Culture result: NO GROWTH 5 DAYS     
 MSSA/MRSA SC BY PCR, NASAL SWAB [[id number]]  (Abnormal) Collected:  11/27/18 1222 Order Status:  Completed Specimen:  Nasal Updated:  11/27/18 1428 Special Requests: NO SPECIAL REQUESTS Culture result: MRSA target DNA not detected, SA target DNA detected. A MRSA negative, SA positive test result does not preclude MRSA nasal colonization. Griselda Budd STAIN [[id number]] Collected:  11/25/18 1220 Order Status:  Completed Specimen:  Wound from Abscess Updated:  11/27/18 4315 Special Requests: --     
  RIGHT THIGH 
  
  GRAM STAIN NO DEFINITE ORGANISM SEEN     
   20 TO 30 WBC'S PER OIF Culture result: NO GROWTH 2 DAYS Imaging last 24 hours:   
No results found. ASSESSMENT Hospital Problems as of 12/3/2018 Never Reviewed Codes Class Noted - Resolved POA Polysubstance abuse ([facility]) (Chronic) ICD-10-CM: F19.10 ICD-9-CM: 305.90  11/28/2018 - Present Yes Other insomnia (Chronic) ICD-10-CM: G47.09 
ICD-9-CM: 780.52  11/27/2018 - Present Yes * (Principal) Necrotizing fasciitis ([address] ICD-10-CM: M72.6 ICD-9-CM: 728.86  11/26/2018 - Present Yes RESOLVED: Acute blood loss anemia ICD-10-CM: D62 
ICD-9-CM: 285.1  11/29/2018 - 12/3/2018 Yes RESOLVED: Abscess of thigh ICD-10-CM: L02.419 ICD-9-CM: 682.6  11/24/2018 - 12/3/2018 Yes Plan: · R thigh abscess/necrotizing fasciitis-  
· s/p excisional debridement of R thigh on 11/25. · Per final ID note, \"Continue Zosyn until 12/9/18, then dc home on 2 weeks of augmentin. He can f/u with Wound Care. He establishes a new PCP in January when his insurance kicks in. \" 
· Wound vac · Wean off IV narcotics · ABL Anemia-  
· s/p 1 unit pRBC · monitor · Insomnia-  
· improved with Restoril; continue. · Polysubstance abuse-  
· Need to wean off IV narcotics DVT Prophylaxis: SCDs Signed By: Wilfredo Radford MD   
 December 3, 2018

## 2018-12-03 NOTE — WOUND CARE
Right thigh wound vac dressing change wound is pink with granulation. Well healing. 2 pieces foam and steri strips at upper and lower portions of wound. Mushroom technique for trac pad. Will monitor.

## 2018-12-03 NOTE — PROGRESS NOTES
END OF SHIFT NOTE: 
 
INTAKE/OUTPUT 
12/01 0701 - 12/02 0700 In: 694 [I.V.:694] Out: 1100 [Urine:1000; Drains:100] Voiding: YES Catheter: NO 
Drain:  
Vacuum Assisted Closure Anterior;Right;Upper Leg (Active) Vac Status Suction, continuous 12/2/2018  6:30 AM  
Suction (mmHg) 125 12/2/2018  6:30 AM  
Site Assessment Clean, dry, & intact 12/2/2018  6:30 AM  
Dressing Status Clean, dry, & intact 12/2/2018  6:30 AM  
Drainage Description Serosanguinous 12/2/2018  6:30 AM  
Drainage Chamber Level (ml) 450 ml 12/2/2018  6:30 AM  
Cannister Changed No 12/2/2018  6:30 AM  
Output (ml) 100 ml 12/2/2018  6:30 AM  
 
 
 
 
 
 
Flatus: Patient does have flatus present. Stool:  2 occurrences. Characteristics: 
Stool Assessment Stool Appearance: Loose Emesis: 0 occurrences. Characteristics: VITAL SIGNS Patient Vitals for the past 12 hrs: 
 Temp Pulse Resp BP SpO2  
12/02/18 1529 98.7 °F (37.1 °C) 85 18 133/86 98 % 12/02/18 1047 98.6 °F (37 °C) 95 18 136/83 93 % Pain Assessment Pain Intensity 1: 8 (12/02/18 1922) Pain Location 1: Leg 
Pain Intervention(s) 1: Medication (see MAR) Patient Stated Pain Goal: 0 Ambulating No 
 
Shift report given to oncoming nurse at the bedside.  
 
Kalyan Blake RN

## 2018-12-03 NOTE — PROGRESS NOTES
Infectious Disease Progress Note Today's Date: 12/3/2018 Admit Date: 2018 Impression: · Necrotizing fasciitis right thigh s/p I&D 18; operative cx  so far without growth. Blood cx  negative. Blood cx  no growth to date. Wound vac should be changed today. · Debrided area close to hip joint. CT as below. Surgery exploring wound vac options. · MSSA nares colonization positive but not MRSA. History of strep intermedius of shoulder area in 2017 and an alpha hemolytic strep in January from unclear site. · Heroin abuse; \"skin pops\": states had injected right hip/thigh area multiple times. · Tobacco abuse · Medical non-adherence Plan:  
 
· Case d/w Wound Care and they are quite encouraged with his recent wound improvement. He is walking the halls and denies R hip pain · Continue Zosyn until 18, then dc home on 2 weeks of augmentin. He can f/u with Wound Care. He establishes a new PCP in January when his insurance kicks in. · Since plan is to remain here, could change IJ to PICC if desired. · ID will sign off, call if ?'s Anti-infectives:  
Vanc (- ) Zosyn (- Subjective: No complaints, no f/c/s, n/v/d, sob, etc... Review of Systems:  A comprehensive review of systems was negative except for that written in the History of Present Illness. Objective:  
 
Visit Vitals /81 (BP 1 Location: Right arm, BP Patient Position: At rest) Pulse 73 Temp 98.1 °F (36.7 °C) Resp 18 Ht 6' (1.829 m) Wt 68 kg (150 lb) SpO2 98% BMI 20.34 kg/m² Temp (24hrs), Av.5 °F (36.9 °C), Min:98 °F (36.7 °C), Max:98.9 °F (37.2 °C) Lines:R IJ line, wound vac right thigh Full exam performed and no changes compared with previous apart from that changed below. Physical Exam: Exam:   
 General: NC/AT, alert and cooperative, looks stated age, in NAD HEENT: PERRL, non-icteric sclera  Cardiac: RRR, no MRG 
 Pulmonary:clear bilaterally without rales or wheezes Abdomen: soft, non-tender, non-distended, BS normal 
 Skin:no rashes, lesions or wounds MSK:no enlarged or swollen joints in limbs or sternoclavicular area Extremities: no cords/clots/cyanosis, pulses palpable Right thigh swelling has resolved. VAC in place Psychiatric:  mood and affect appropriate to situation Data Review: CBC:  
Recent Labs 12/03/18 
0309 12/02/18 
9581 12/01/18 
1681 HGB 8.5* 8.9* 8.5* HCT 26.9* 28.7*  --   
 
CMP:  
Recent Labs 12/03/18 
0309 12/02/18 
1484 12/01/18 
6741 GLU  --  91 98 NA  --  140 142  
K 3.8 3.2* 3.4*  
CL  --  103 107 CO2  --  28 26 BUN  --  10 11 CREA  --  0.81 0.88 CA  --  9.0 8.6 AGAP  --  9 9 Liver Enzymes: No results for input(s): TP, ALB, TBIL, AP, SGOT, GPT in the last 72 hours. No lab exists for component: DBIL Microbiology:  
 
All Micro Results Procedure Component Value Units Date/Time Cassy Munoz [[id number]] Collected:  11/25/18 1220 Order Status:  Completed Specimen:  Abscess Updated:  12/03/18 8005 Special Requests: --     
  RIGHT THIGH Culture result:    
  NO ANAEROBES ISOLATED 7 DAYS  
     
 CULTURE, BLOOD [[id number]] Collected:  11/24/18 1351 Order Status:  Completed Specimen:  Whole Blood Updated:  11/29/18 0730 Special Requests: --     
  RIGHT JUGULAR VEIN Culture result: NO GROWTH 5 DAYS     
 CULTURE, BLOOD [[id number]] Collected:  11/24/18 1400 Order Status:  Completed Specimen:  Blood Updated:  11/29/18 0730 Special Requests: NO SPECIAL REQUESTS Culture result: NO GROWTH 5 DAYS     
 MSSA/MRSA SC BY PCR, NASAL SWAB [[id number]]  (Abnormal) Collected:  11/27/18 1222 Order Status:  Completed Specimen:  Nasal Updated:  11/27/18 1428 Special Requests: NO SPECIAL REQUESTS Culture result: MRSA target DNA not detected, SA target DNA detected.    A MRSA negative, SA positive test result does not preclude MRSA nasal colonization. Shani Micah STAIN [[id number]] Collected:  11/25/18 1220 Order Status:  Completed Specimen:  Wound from Abscess Updated:  11/27/18 3310 Special Requests: --     
  RIGHT THIGH 
  
  GRAM STAIN NO DEFINITE ORGANISM SEEN     
   20 TO 30 WBC'S PER OIF Culture result: NO GROWTH 2 DAYS Imaging:  
 
CT right hip with contrast 11/24/2018 
  
CLINICAL HISTORY: Right hip pain and swelling to right hip and buttocks that is 
warm and painful to touch for one week. 
  
TECHNIQUE: Multiple contiguous helical CT images were obtained to the right hip 
with images reconstructed in the axial plane at 2.5 mm intervals following the 
uneventful intravenous administration of 100 mL Isovue-370. 2-D sagittal, and 
coronal reconstructed images were made and reviewed. All CT scans performed at 
this facility use one or all of the following: Automated exposure control, 
adjustment of the mA and/or kVp according to patient's size, iterative 
reconstruction.  
  
Findings:   
Abnormal enhancing fluid collections are seen within the right lateral hip, and 
right proximal thigh soft tissues is appearance is concerning for multiple 
abscess collections within the soft tissues. These begin in the lateral hip soft 
tissues just inferior to the level of the right iliac crest. The subsequently 
extended into the proximal right thigh soft tissues with some involvement felt 
to be seen in all compartments of the proximal right thigh although most 
prominently involving the posterior compartment. The largest distinct collection 
appears to bridge the anterior and posterior compartments of the proximal right 
thigh seen on axial image 68 measuring 11.4 cm x 4.5 cm in greatest transverse 
dimensions. Note is made that some persistent abscess is seen on the most 
inferior image seen in the lateral compartment.  Therefore, the distal extent of 
 these changes is uncertain. Early osteomyelitis can be missed by CT imaging. However, no clear bony destructive changes are seen to strongly suggest 
osteomyelitis. No abnormal soft tissue gas is seen. Likely reactive prominent 
right groin lymph nodes are seen. 
  
IMPRESSION IMPRESSION:   
1. Abscess collections within the right lateral hip and proximal thigh soft 
tissues as described above. Signed By: Fadia Pond MD   
 December 3, 2018

## 2018-12-03 NOTE — PROGRESS NOTES
Problem: Mobility Impaired (Adult and Pediatric) Goal: *Acute Goals and Plan of Care (Insert Text) STG: ALL SHORT TERM GOALS MET 11/29/2018 
(1.)Mr. Carlene Dougherty will move from supine to sit and sit to supine  with CONTACT GUARD ASSIST within 3 treatment day(s). (2.)Mr. Carlene Dougherty will transfer from bed to chair and chair to bed with CONTACT GUARD ASSIST using the least restrictive device within 3 treatment day(s). (3.)Mr. Carlene Dougherty will ambulate with CONTACT GUARD ASSIST for 30 feet with the least restrictive device within 3 treatment day(s). LTG: 
(1.)Mr. Carlene Dougherty will move from supine to sit and sit to supine  in bed with INDEPENDENT within 7 treatment day(s). (2.)Mr. Carlene Dougherty will transfer from bed to chair and chair to bed with MODIFIED INDEPENDENT using the least restrictive device within 7 treatment day(s). (3.)Mr. Carlene Dougherty will ambulate with STAND BY ASSIST for 250+ feet with the least restrictive device within 7 treatment day(s). ________________________________________________________________________________________________ PHYSICAL THERAPY: Daily Note, Treatment Day: 3rd, PM 12/3/2018INPATIENT: Hospital Day: 10 Payor: SELF PAY / Plan: [facility] SELF PAY / Product Type: Self Pay /  
  
NAME/AGE/GENDER: Karmen Aguilar is a 44 y.o. male PRIMARY DIAGNOSIS: Abscess of thigh Necrotizing fasciitis ([address] Necrotizing fasciitis ([address] Procedure(s) (LRB): 
I&D OF LEFT THIGH ABSCESS (Right) 8 Days Post-Op ICD-10: Treatment Diagnosis:  
 · Generalized Muscle Weakness (M62.81) · Difficulty in walking, Not elsewhere classified (R26.2) · History of falling (Z91.81) Precaution/Allergies: 
Patient has no known allergies. ASSESSMENT:  
Mr. Carlene Dougherty was supine upon contact and agreeable to PT; motivated to mobilize. Patient performs all mobility to SBA.   He walked with the walker with cues on proper gait technique including heel strike, step through and toe off. He responded well to verbal cues for technique but is still struggling with active knee bend with swing through. He performed below exercises in walker as well and encouraged him to do exercises in bed and chair throughout the day. This section established at most recent assessment PROBLEM LIST (Impairments causing functional limitations): 1. Decreased Strength 2. Decreased ADL/Functional Activities 3. Decreased Transfer Abilities 4. Decreased Ambulation Ability/Technique 5. Decreased Balance 6. Increased Pain 7. Decreased Activity Tolerance 8. Increased Fatigue 9. Decreased Flexibility/Joint Mobility 10. Decreased [place] with Home Exercise Program 
 INTERVENTIONS PLANNED: (Benefits and precautions of physical therapy have been discussed with the patient.) 1. Balance Exercise 2. Bed Mobility 3. Family Education 4. Gait Training 5. Home Exercise Program (HEP) 6. Neuromuscular Re-education/Strengthening 7. Range of Motion (ROM) 8. Therapeutic Activites 9. Therapeutic Exercise/Strengthening 10. Transfer Training TREATMENT PLAN: Frequency/Duration: 3 times a week for duration of hospital stay Rehabilitation Potential For Stated Goals: Good RECOMMENDED REHABILITATION/EQUIPMENT: (at time of discharge pending progress): Due to the probability of continued deficits (see above) this patient will likely need continued skilled physical therapy after discharge. Equipment:  
? None at this time HISTORY:  
History of Present Injury/Illness (Reason for Referral): 
See H&P below Jaskaran Cheatham is a 44 y.o. male with a history of GERD and heroin abuse presents with pain in his right hip region for the past few weeks. [facility] admits to falling 4 days prior to the development of pain.  He reports subjective fevers.  He states now it is very difficult to walk due to pain.  Denies similar symptoms in the past.  No radiation of pain.  Patient is quite a poor historian.  Reviewed chart and noted that he had been seen in the emergency department November 5, 12th, and 18th.  November 5th, he was seen for fever, Nov 12 for abdominal pain and heroin use, November 18 for the right hip pain.   At that visit he was found to have significant leukocytosis and after further search was found to have a large left arm abscess that was drained.  He was prescribed clindamycin.  Patient states he took all of this antibiotic, but interestingly, it was only prescribed 6 days ago. Ebony King admits that he \"overtook it. \" Tabitha Velasco also never returned for packing removal or dressing change. Tabitha Velasco states he has been too focused on the pain in his right hip. No prior hip surgeries. He states last heroin use was 4 days ago and snorts it. Tabitha Velasco denies any IV drug use. Past Medical History/Comorbidities:  
Mr. Denise Bales  has a past medical history of GERD (gastroesophageal reflux disease), Necrotizing fasciitis ([address] and Other insomnia. Mr. Denise Bales  has a past surgical history that includes hx other surgical and I&D OF LEFT THIGH ABSCESS (Right, 11/25/2018). Social History/Living Environment:  
Home Environment: Private residence # Steps to Enter: 3 One/Two Story Residence: One story Living Alone: No 
Support Systems: Spouse/Significant Other/Partner Patient Expects to be Discharged to[de-identified] Private residence Current DME Used/Available at Home: None Prior Level of Function/Work/Activity: 
Independent with all mobility at baseline, 1 fall Number of Personal Factors/Comorbidities that affect the Plan of Care: 1-2: MODERATE COMPLEXITY EXAMINATION:  
Most Recent Physical Functioning:  
Gross Assessment: 
  
         
  
Posture: 
  
Balance: 
  Bed Mobility: 
Supine to Sit: Modified independent Sit to Supine: Independent Wheelchair Mobility: 
  
Transfers: 
Sit to Stand: Modified independent Stand to Sit: Independent Gait: 
  
 Gait Abnormalities: Antalgic;Decreased step clearance;Early heel rise Distance (ft): 250 Feet (ft) Assistive Device: Walker, rolling Ambulation - Level of Assistance: Stand-by assistance Duration: 15 Minutes Body Structures Involved: 1. Nerves 2. Heart 3. Lungs 4. Bones 5. Joints 6. Muscles 7. Ligaments Body Functions Affected: 1. Sensory/Pain 2. Cardio 3. Respiratory 4. Neuromusculoskeletal 
5. Movement Related 6. Skin Related Activities and Participation Affected: 1. General Tasks and Demands 2. Mobility 3. Self Care 4. Domestic Life 5. Interpersonal Interactions and Relationships 6. Community, Social and [place] Number of elements that affect the Plan of Care: 4+: HIGH COMPLEXITY CLINICAL PRESENTATION:  
Presentation: Evolving clinical presentation with changing clinical characteristics: MODERATE COMPLEXITY CLINICAL DECISION MAKING:  
[facility] AM-PAC 6 Clicks Basic Mobility Inpatient Short Form How much difficulty does the patient currently have. .. Unable A Lot A Little None 1. Turning over in bed (including adjusting bedclothes, sheets and blankets)? [] 1   [] 2   [x] 3   [] 4  
2. Sitting down on and standing up from a chair with arms ( e.g., wheelchair, bedside commode, etc.)   [] 1   [x] 2   [] 3   [] 4  
3. Moving from lying on back to sitting on the side of the bed? [] 1   [] 2   [x] 3   [] 4 How much help from another person does the patient currently need. .. Total A Lot A Little None 4. Moving to and from a bed to a chair (including a wheelchair)? [] 1   [x] 2   [] 3   [] 4  
5. Need to walk in hospital room? [] 1   [x] 2   [] 3   [] 4  
6. Climbing 3-5 steps with a railing? [] 1   [x] 2   [] 3   [] 4  
© 2007, Trustees of [facility], under license to [employer]. All rights reserved Score:  Initial: 14 Most Recent: X (Date: -- ) Interpretation of Tool:  Represents activities that are increasingly more difficult (i.e. Bed mobility, Transfers, Gait). Score 24 23 22-20 19-15 14-10 9-7 6 Modifier CH CI CJ CK CL CM CN   
 
? Mobility - Walking and Moving Around:  
  - CURRENT STATUS: CL - 60%-79% impaired, limited or restricted  - GOAL STATUS: CK - 40%-59% impaired, limited or restricted  - D/C STATUS:  ---------------To be determined--------------- Payor: SELF PAY / Plan: [facility] SELF PAY / Product Type: Self Pay /   
 
Medical Necessity:    
· Patient is expected to demonstrate progress in strength, range of motion, balance, coordination and functional technique to decrease assistance required with gait, transfers, and functional mobility. Asmita Hernandez Reason for Services/Other Comments: 
· Patient continues to require skilled intervention due to decreased strength, decreased balance, decreased functional tolerance, decreased cardiopulmonary endurance affecting participation in basic ADLs and functional tasks. Use of outcome tool(s) and clinical judgement create a POC that gives a: Clear prediction of patient's progress: LOW COMPLEXITY  
  
 
 
 
TREATMENT:  
(In addition to Assessment/Re-Assessment sessions the following treatments were rendered) Pre-treatment Symptoms/Complaints: none Pain: Initial:  
Pain Intensity 1: 0  Post Session: 0/10 Gait Training (15 Minutes):  Gait training to improve and/or restore physical functioning as related to mobility, strength and balance. Ambulated 250 Feet (ft) with Stand-by assistance using a Walker, rolling and moderate   related to their push off and heel strike to promote proper body mechanics. Instruction in performance of leading with his knee during swing through to correct gait. Therapeutic Exercise: (10 Minutes):  Exercises per grid below to improve mobility and strength. Required minimal visual and verbal cues to promote proper body mechanics. Progressed complexity of movement as indicated. Date: 
11/27/18 Date: 
12/3/18 Date: Activity/Exercise Parameters Parameters Parameters Ankle pumps 15 X B Quad set 15 X B  Standing 5x L Glute set 15 X Heel slides 10 X B AA R Hip abduction 10 X B AA R Standing knee bends  10x R Standing calf raises  10x B 
5x2 B single Standing marching  10x B Braces/Orthotics/Lines/Etc:  
· IV 
· drain wound vac · O2 Device: Room air Treatment/Session Assessment:   
· Response to Treatment:  See above · Interdisciplinary Collaboration:  
o Physical Therapy Assistant 
o Registered Nurse · After treatment position/precautions:  
o Supine in bed 
o Bed/Chair-wheels locked 
o Bed in low position 
o Call light within reach 
o RN notified · Compliance with Program/Exercises: Compliant all of the time · Recommendations/Intent for next treatment session: \"Next visit will focus on advancements to more challenging activities and reduction in assistance provided\". Total Treatment Duration: PT Patient Time In/Time Out Time In: 8420 Time Out: 1500 Jannet Swan, PTA

## 2018-12-04 LAB
HCT VFR BLD AUTO: 28.2 % (ref 41.1–50.3)
HGB BLD-MCNC: 8.6 G/DL (ref 13.6–17.2)
POTASSIUM SERPL-SCNC: 3.7 MMOL/L (ref 3.5–5.1)

## 2018-12-04 PROCEDURE — 65270000029 HC RM PRIVATE

## 2018-12-04 PROCEDURE — 74011250636 HC RX REV CODE- 250/636: Performed by: INTERNAL MEDICINE

## 2018-12-04 PROCEDURE — 74011250637 HC RX REV CODE- 250/637: Performed by: INTERNAL MEDICINE

## 2018-12-04 PROCEDURE — 85018 HEMOGLOBIN: CPT

## 2018-12-04 PROCEDURE — 74011250637 HC RX REV CODE- 250/637: Performed by: SURGERY

## 2018-12-04 PROCEDURE — 74011000258 HC RX REV CODE- 258: Performed by: INTERNAL MEDICINE

## 2018-12-04 PROCEDURE — 74750000023 HC WOUND THERAPY

## 2018-12-04 PROCEDURE — 84132 ASSAY OF SERUM POTASSIUM: CPT

## 2018-12-04 RX ORDER — HYDROMORPHONE HYDROCHLORIDE 1 MG/ML
0.5 INJECTION, SOLUTION INTRAMUSCULAR; INTRAVENOUS; SUBCUTANEOUS
Status: DISCONTINUED | OUTPATIENT
Start: 2018-12-04 | End: 2018-12-07

## 2018-12-04 RX ADMIN — HYDROCODONE BITARTRATE AND ACETAMINOPHEN 1 TABLET: 10; 325 TABLET ORAL at 17:52

## 2018-12-04 RX ADMIN — HYDROMORPHONE HYDROCHLORIDE 0.5 MG: 1 INJECTION, SOLUTION INTRAMUSCULAR; INTRAVENOUS; SUBCUTANEOUS at 09:20

## 2018-12-04 RX ADMIN — PIPERACILLIN SODIUM,TAZOBACTAM SODIUM 3.38 G: 3; .375 INJECTION, POWDER, FOR SOLUTION INTRAVENOUS at 17:39

## 2018-12-04 RX ADMIN — HYDROMORPHONE HYDROCHLORIDE 0.5 MG: 1 INJECTION, SOLUTION INTRAMUSCULAR; INTRAVENOUS; SUBCUTANEOUS at 15:37

## 2018-12-04 RX ADMIN — Medication 10 ML: at 13:58

## 2018-12-04 RX ADMIN — HYDROMORPHONE HYDROCHLORIDE 0.5 MG: 1 INJECTION, SOLUTION INTRAMUSCULAR; INTRAVENOUS; SUBCUTANEOUS at 04:14

## 2018-12-04 RX ADMIN — HYDROCODONE BITARTRATE AND ACETAMINOPHEN 1 TABLET: 10; 325 TABLET ORAL at 21:42

## 2018-12-04 RX ADMIN — HYDROMORPHONE HYDROCHLORIDE 0.5 MG: 1 INJECTION, SOLUTION INTRAMUSCULAR; INTRAVENOUS; SUBCUTANEOUS at 20:54

## 2018-12-04 RX ADMIN — HYDROCODONE BITARTRATE AND ACETAMINOPHEN 1 TABLET: 10; 325 TABLET ORAL at 13:57

## 2018-12-04 RX ADMIN — HYDROCODONE BITARTRATE AND ACETAMINOPHEN 1 TABLET: 10; 325 TABLET ORAL at 03:10

## 2018-12-04 RX ADMIN — PIPERACILLIN SODIUM,TAZOBACTAM SODIUM 3.38 G: 3; .375 INJECTION, POWDER, FOR SOLUTION INTRAVENOUS at 09:46

## 2018-12-04 RX ADMIN — HYDROCODONE BITARTRATE AND ACETAMINOPHEN 1 TABLET: 10; 325 TABLET ORAL at 07:52

## 2018-12-04 RX ADMIN — HYDROMORPHONE HYDROCHLORIDE 0.5 MG: 1 INJECTION, SOLUTION INTRAMUSCULAR; INTRAVENOUS; SUBCUTANEOUS at 00:05

## 2018-12-04 RX ADMIN — PIPERACILLIN SODIUM,TAZOBACTAM SODIUM 3.38 G: 3; .375 INJECTION, POWDER, FOR SOLUTION INTRAVENOUS at 03:10

## 2018-12-04 NOTE — PROGRESS NOTES
Hospitalist Progress Note 2018 Admit Date: 2018 10:58 AM  
NAME: Kassie Rizo :  1975 MRN:  [id number] Attending: Jermaine Unger DO 
PCP:  None SUBJECTIVE:  
Kassie Rizo is a 45 yo M with heroin abuse who was admitted with R thigh abscess. He is s/p surgical debridement on  with wound vac in place. Blood and wound cultures negative. Was on vanc and zosyn initially; now on zosyn only per ID. Hgb dropped to 7 and given 1 unit pRBC on  with improvement to 8.5. Plan is for IV abx until  followed by PO. Self pay so will complete IV course here. 12/3 - pt denies complaints. Feeling good. Pain decreased. Ambulating well.  - pt feels good. No new complaints. Ambulating. RLE swelling improved PHYSICAL EXAM  
 
Visit Vitals /85 Pulse 80 Temp 98.1 °F (36.7 °C) Resp 18 Ht 6' (1.829 m) Wt 68 kg (150 lb) SpO2 100% BMI 20.34 kg/m² Temp (24hrs), Av.2 °F (36.8 °C), Min:98 °F (36.7 °C), Max:98.4 °F (36.9 °C) Patient Vitals for the past 24 hrs: 
 Temp Pulse Resp BP SpO2  
18 0730 98.1 °F (36.7 °C) 80 18 134/85 100 % 18 0315 98.4 °F (36.9 °C) 68 18 (!) 135/91 100 % 18 2251 98.3 °F (36.8 °C) 75 18 130/84 99 % 18 1952 98 °F (36.7 °C) 84 18 142/85 99 % 18 1130 98.4 °F (36.9 °C) 79 18 141/88 99 % Oxygen Therapy O2 Sat (%): 100 % (18 0730) Pulse via Oximetry: 82 beats per minute (18 1340) O2 Device: Room air (18 0232) Intake/Output Summary (Last 24 hours) at 2018 1057 Last data filed at 2018 2452 Gross per 24 hour Intake 779 ml Output 850 ml Net -71 ml General: No acute distress   
Extremities: No cyanosis, clubbing or edema, wound vac in place to R thigh. R knee NTTP, mobile without pain. No erythema Neurologic:  No focal deficits Recent Results (from the past 24 hour(s)) HGB & HCT  Collection Time: 18  3:14 AM  
 Result Value Ref Range HGB 8.6 (L) 13.6 - 17.2 g/dL HCT 28.2 (L) 41.1 - 50.3 % POTASSIUM Collection Time: 12/04/18  3:14 AM  
Result Value Ref Range Potassium 3.7 3.5 - 5.1 mmol/L All Micro Results Procedure Component Value Units Date/Time Annelise Tuttle [[id number]] Collected:  11/25/18 1220 Order Status:  Completed Specimen:  Abscess Updated:  12/03/18 0326 Special Requests: --     
  RIGHT THIGH Culture result:    
  NO ANAEROBES ISOLATED 7 DAYS  
     
 CULTURE, BLOOD [[id number]] Collected:  11/24/18 1351 Order Status:  Completed Specimen:  Whole Blood Updated:  11/29/18 0730 Special Requests: --     
  RIGHT JUGULAR VEIN Culture result: NO GROWTH 5 DAYS     
 CULTURE, BLOOD [[id number]] Collected:  11/24/18 1400 Order Status:  Completed Specimen:  Blood Updated:  11/29/18 0730 Special Requests: NO SPECIAL REQUESTS Culture result: NO GROWTH 5 DAYS     
 MSSA/MRSA SC BY PCR, NASAL SWAB [[id number]]  (Abnormal) Collected:  11/27/18 1222 Order Status:  Completed Specimen:  Nasal Updated:  11/27/18 1428 Special Requests: NO SPECIAL REQUESTS Culture result: MRSA target DNA not detected, SA target DNA detected. A MRSA negative, SA positive test result does not preclude MRSA nasal colonization. Cas Sheriff STAIN [[id number]] Collected:  11/25/18 1220 Order Status:  Completed Specimen:  Wound from Abscess Updated:  11/27/18 7354 Special Requests: --     
  RIGHT THIGH 
  
  GRAM STAIN NO DEFINITE ORGANISM SEEN     
   20 TO 30 WBC'S PER OIF Culture result: NO GROWTH 2 DAYS Imaging last 24 hours:   
No results found. ASSESSMENT Hospital Problems as of 12/4/2018 Never Reviewed Codes Class Noted - Resolved POA Polysubstance abuse (HCC) (Chronic) ICD-10-CM: F19.10 ICD-9-CM: 305.90  11/28/2018 - Present Yes  Other insomnia (Chronic) ICD-10-CM: K56.26 
 ICD-9-CM: 780.52  11/27/2018 - Present Yes * (Principal) Necrotizing fasciitis ([facility] ICD-10-CM: M72.6 ICD-9-CM: 728.86  11/26/2018 - Present Yes RESOLVED: Acute blood loss anemia ICD-10-CM: D62 
ICD-9-CM: 285.1  11/29/2018 - 12/3/2018 Yes RESOLVED: Abscess of thigh ICD-10-CM: L02.419 ICD-9-CM: 682.6  11/24/2018 - 12/3/2018 Yes Plan: · R thigh abscess/necrotizing fasciitis-  
· s/p excisional debridement of R thigh on 11/25. · Per final ID note, \"Continue Zosyn until 12/9/18, then dc home on 2 weeks of augmentin. He can f/u with Wound Care. He establishes a new PCP in January when his insurance kicks in. \" 
· Wound vac · Wean off IV narcotics · ABL Anemia-  
· s/p 1 unit pRBC · monitor · Insomnia-  
· improved with Restoril; continue. · Polysubstance abuse-  
· Am weaning off IV narcotics. DVT Prophylaxis: SCDs Signed By: Sherly Fleming MD   
 December 4, 2018

## 2018-12-04 NOTE — PROGRESS NOTES
END OF SHIFT NOTE: 
 
INTAKE/OUTPUT 
12/02 0701 - 12/03 0700 In: 441 [I.V.:441] Out: 300 [Urine:300] Voiding: YES Catheter: NO 
Drain:  
Vacuum Assisted Closure Anterior;Right;Upper Leg (Active) Vac Status Suction, continuous 12/2/2018  6:30 AM  
Suction (mmHg) 125 12/2/2018  6:30 AM  
Site Assessment Clean, dry, & intact 12/2/2018  6:30 AM  
Dressing Status Clean, dry, & intact 12/2/2018  6:30 AM  
Drainage Description Serosanguinous 12/2/2018  6:30 AM  
Drainage Chamber Level (ml) 450 ml 12/3/2018 10:00 AM  
[place] Changed Yes 12/3/2018 10:00 AM  
Output (ml) 0 ml 12/3/2018 10:00 AM  
 
 
 
 
 
 
Flatus: Patient does have flatus present. Stool:  0 occurrences. Characteristics: 
Stool Assessment Stool Appearance: Soft Emesis: 0 occurrences. Characteristics: VITAL SIGNS Patient Vitals for the past 12 hrs: 
 Temp Pulse Resp BP SpO2  
12/03/18 1130 98.4 °F (36.9 °C) 79 18 141/88 99 % 12/03/18 0728 98.1 °F (36.7 °C) 73 18 134/81 98 % Pain Assessment Pain Intensity 1: 10 (12/03/18 1616) Pain Location 1: Leg 
Pain Intervention(s) 1: Medication (see MAR) Patient Stated Pain Goal: 0 Ambulating Yes Shift report given to oncoming nurse at the bedside.  
 
Volodymyr Chacon RN

## 2018-12-05 PROCEDURE — 97605 NEG PRS WND THER DME<=50SQCM: CPT

## 2018-12-05 PROCEDURE — 97116 GAIT TRAINING THERAPY: CPT

## 2018-12-05 PROCEDURE — 74011000258 HC RX REV CODE- 258: Performed by: INTERNAL MEDICINE

## 2018-12-05 PROCEDURE — 97110 THERAPEUTIC EXERCISES: CPT

## 2018-12-05 PROCEDURE — 65270000029 HC RM PRIVATE

## 2018-12-05 PROCEDURE — 74011250636 HC RX REV CODE- 250/636: Performed by: INTERNAL MEDICINE

## 2018-12-05 PROCEDURE — 74750000023 HC WOUND THERAPY

## 2018-12-05 PROCEDURE — 74011250637 HC RX REV CODE- 250/637: Performed by: INTERNAL MEDICINE

## 2018-12-05 PROCEDURE — 77030019934 HC DRSG VAC ASST KCON -B

## 2018-12-05 PROCEDURE — 74011250637 HC RX REV CODE- 250/637: Performed by: SURGERY

## 2018-12-05 RX ADMIN — HYDROMORPHONE HYDROCHLORIDE 0.5 MG: 1 INJECTION, SOLUTION INTRAMUSCULAR; INTRAVENOUS; SUBCUTANEOUS at 17:20

## 2018-12-05 RX ADMIN — HYDROCODONE BITARTRATE AND ACETAMINOPHEN 1 TABLET: 5; 325 TABLET ORAL at 14:42

## 2018-12-05 RX ADMIN — HYDROMORPHONE HYDROCHLORIDE 0.5 MG: 1 INJECTION, SOLUTION INTRAMUSCULAR; INTRAVENOUS; SUBCUTANEOUS at 03:20

## 2018-12-05 RX ADMIN — HYDROCODONE BITARTRATE AND ACETAMINOPHEN 1 TABLET: 10; 325 TABLET ORAL at 09:58

## 2018-12-05 RX ADMIN — HYDROMORPHONE HYDROCHLORIDE 0.5 MG: 1 INJECTION, SOLUTION INTRAMUSCULAR; INTRAVENOUS; SUBCUTANEOUS at 11:16

## 2018-12-05 RX ADMIN — Medication 5 ML: at 14:43

## 2018-12-05 RX ADMIN — PIPERACILLIN SODIUM,TAZOBACTAM SODIUM 3.38 G: 3; .375 INJECTION, POWDER, FOR SOLUTION INTRAVENOUS at 01:42

## 2018-12-05 RX ADMIN — PIPERACILLIN SODIUM,TAZOBACTAM SODIUM 3.38 G: 3; .375 INJECTION, POWDER, FOR SOLUTION INTRAVENOUS at 17:20

## 2018-12-05 RX ADMIN — HYDROCODONE BITARTRATE AND ACETAMINOPHEN 1 TABLET: 10; 325 TABLET ORAL at 19:15

## 2018-12-05 RX ADMIN — HYDROCODONE BITARTRATE AND ACETAMINOPHEN 1 TABLET: 10; 325 TABLET ORAL at 01:42

## 2018-12-05 RX ADMIN — PIPERACILLIN SODIUM,TAZOBACTAM SODIUM 3.38 G: 3; .375 INJECTION, POWDER, FOR SOLUTION INTRAVENOUS at 10:00

## 2018-12-05 RX ADMIN — HYDROCODONE BITARTRATE AND ACETAMINOPHEN 1 TABLET: 10; 325 TABLET ORAL at 05:46

## 2018-12-05 NOTE — PROGRESS NOTES
Problem: Mobility Impaired (Adult and Pediatric) Goal: *Acute Goals and Plan of Care (Insert Text) STG: ALL SHORT TERM GOALS MET 11/29/2018 
(1.)Mr. Di John will move from supine to sit and sit to supine  with CONTACT GUARD ASSIST within 3 treatment day(s). (2.)Mr. Di John will transfer from bed to chair and chair to bed with CONTACT GUARD ASSIST using the least restrictive device within 3 treatment day(s). (3.)Mr. Di John will ambulate with CONTACT GUARD ASSIST for 30 feet with the least restrictive device within 3 treatment day(s). LTG: 
(1.)Mr. Di John will move from supine to sit and sit to supine  in bed with INDEPENDENT within 7 treatment day(s). (2.)Mr. Di John will transfer from bed to chair and chair to bed with MODIFIED INDEPENDENT using the least restrictive device within 7 treatment day(s). (3.)Mr. Di John will ambulate with STAND BY ASSIST for 250+ feet with the least restrictive device within 7 treatment day(s). ________________________________________________________________________________________________ PHYSICAL THERAPY: Daily Note, Treatment Day: 4th, PM 12/5/2018INPATIENT: Hospital Day: 12 Payor: SELF PAY / Plan: [facility] SELF PAY / Product Type: Self Pay /  
  
NAME/AGE/GENDER: Janine Ferrara is a 44 y.o. male PRIMARY DIAGNOSIS: Abscess of thigh Necrotizing fasciitis ([address] Necrotizing fasciitis ([address] Procedure(s) (LRB): 
I&D OF LEFT THIGH ABSCESS (Right) 10 Days Post-Op ICD-10: Treatment Diagnosis:  
 · Generalized Muscle Weakness (M62.81) · Difficulty in walking, Not elsewhere classified (R26.2) · History of falling (Z91.81) Precaution/Allergies: 
Patient has no known allergies. ASSESSMENT:  
Mr. Di John sitting up in his bed agreeable to have therapy. Still has IV and wound vac on RLE thigh. He participated in RLE AROM exercises in supine with heel slides and knee flexion to 45 degrees.   He was very proud of being able to bend it that far. He is mod. Independent with bed mobility with good sitting balance. Sit to stand is supervision to mod. Independent. He ambulated 250' with the r/walker and wound vac on the IV pole with a steady and controlled gait. He started with a very stiff leg walk but with verbal cues relaxed his knee more with a more natural swing movement. He returned to his bed and again practiced his RLE knee/heel slides and ankle pumps. Mr. Florence Turner appears to be making progress and has met almost all his goals. He may benefit from practicing short distance gait without the r/walker since at times he tends to let go and move around without the r/walker. He was pleasant and cooperative with therapy. This section established at most recent assessment PROBLEM LIST (Impairments causing functional limitations): 1. Decreased Strength 2. Decreased ADL/Functional Activities 3. Decreased Transfer Abilities 4. Decreased Ambulation Ability/Technique 5. Decreased Balance 6. Increased Pain 7. Decreased Activity Tolerance 8. Increased Fatigue 9. Decreased Flexibility/Joint Mobility 10. Decreased [place] with Home Exercise Program 
 INTERVENTIONS PLANNED: (Benefits and precautions of physical therapy have been discussed with the patient.) 1. Balance Exercise 2. Bed Mobility 3. Family Education 4. Gait Training 5. Home Exercise Program (HEP) 6. Neuromuscular Re-education/Strengthening 7. Range of Motion (ROM) 8. Therapeutic Activites 9. Therapeutic Exercise/Strengthening 10. Transfer Training TREATMENT PLAN: Frequency/Duration: 3 times a week for duration of hospital stay Rehabilitation Potential For Stated Goals: Good RECOMMENDED REHABILITATION/EQUIPMENT: (at time of discharge pending progress): Due to the probability of continued deficits (see above) this patient will likely need continued skilled physical therapy after discharge. Equipment:  
? None at this time HISTORY:  
History of Present Injury/Illness (Reason for Referral): 
See H&P below Stu Mattson is a 44 y.o. male with a history of GERD and heroin abuse presents with pain in his right hip region for the past few weeks. Radha House admits to falling 4 days prior to the development of pain.  He reports subjective fevers.  He states now it is very difficult to walk due to pain.  Denies similar symptoms in the past.  No radiation of pain.  Patient is quite a poor historian.  Reviewed chart and noted that he had been seen in the emergency department November 5, 12th, and 18th.  November 5th, he was seen for fever, Nov 12 for abdominal pain and heroin use, November 18 for the right hip pain.   At that visit he was found to have significant leukocytosis and after further search was found to have a large left arm abscess that was drained.  He was prescribed clindamycin.  Patient states he took all of this antibiotic, but interestingly, it was only prescribed 6 days ago. Gwendolyn Almendarez admits that he \"overtook it. \" Radha House also never returned for packing removal or dressing change. Radha House states he has been too focused on the pain in his right hip. No prior hip surgeries. He states last heroin use was 4 days ago and snorts it. Radha House denies any IV drug use. Past Medical History/Comorbidities:  
Mr. Kaia Macias  has a past medical history of GERD (gastroesophageal reflux disease), [address]ecrotizing fasciitis [address] [address] and Other insomnia. Mr. Kaia Macias  has a past surgical history that includes hx other surgical and I&D OF LEFT THIGH ABSCESS (Right, 11/25/2018). Social History/Living Environment:  
Home Environment: Private residence # Steps to Enter: 3 One/Two Story Residence: One story Living Alone: No 
Support Systems: Spouse/Significant Other/Partner Patient Expects to be Discharged to[de-identified] Private residence Current DME Used/Available at Home: None Prior Level of Function/Work/Activity: 
Independent with all mobility at baseline, 1 fall Number of Personal Factors/Comorbidities that affect the Plan of Care: 1-2: MODERATE COMPLEXITY EXAMINATION:  
Most Recent Physical Functioning:  
Gross Assessment: 
  
         
  
Posture: 
  
Balance: 
Sitting: Intact Standing: Impaired Standing - Static: Good Standing - Dynamic : Fair Bed Mobility: 
Rolling: Modified independent Supine to Sit: Modified independent Sit to Supine: Independent Scooting: Independent Wheelchair Mobility: 
  
Transfers: 
Sit to Stand: Modified independent Stand to Sit: Independent Stand Pivot Transfers: Modified independent Bed to Chair: Modified independent Gait: 
  
Base of Support: Narrowed Speed/Noelle: Pace decreased (<100 feet/min) Step Length: Left shortened;Right shortened Distance (ft): 250 Feet (ft) Assistive Device: Walker, rolling Ambulation - Level of Assistance: Stand-by assistance Body Structures Involved: 1. Nerves 2. Heart 3. Lungs 4. Bones 5. Joints 6. Muscles 7. Ligaments Body Functions Affected: 1. Sensory/Pain 2. Cardio 3. Respiratory 4. Neuromusculoskeletal 
5. Movement Related 6. Skin Related Activities and Participation Affected: 1. General Tasks and Demands 2. Mobility 3. Self Care 4. Domestic Life 5. Interpersonal Interactions and Relationships 6. Community, Social and [place] Number of elements that affect the Plan of Care: 4+: HIGH COMPLEXITY CLINICAL PRESENTATION:  
Presentation: Evolving clinical presentation with changing clinical characteristics: MODERATE COMPLEXITY CLINICAL DECISION MAKIN [facility] [address] [address] AM-PAC 6 Clicks Basic Mobility Inpatient Short Form How much difficulty does the patient currently have. .. Unable A Lot A Little None 1. Turning over in bed (including adjusting bedclothes, sheets and blankets)? [] 1   [] 2   [x] 3   [] 4  
2.   Sitting down on and standing up from a chair with arms ( e.g., wheelchair, bedside commode, etc.)   [] 1   [x] 2   [] 3   [] 4  
 3. Moving from lying on back to sitting on the side of the bed? [] 1   [] 2   [x] 3   [] 4 How much help from another person does the patient currently need. .. Total A Lot A Little None 4. Moving to and from a bed to a chair (including a wheelchair)? [] 1   [x] 2   [] 3   [] 4  
5. Need to walk in hospital room? [] 1   [x] 2   [] 3   [] 4  
6. Climbing 3-5 steps with a railing? [] 1   [x] 2   [] 3   [] 4  
© 2007, Trustees of [address] [place] [address] [address] [address], under license to [employer]. All rights reserved Score:  Initial: 14 Most Recent: X (Date: -- ) Interpretation of Tool:  Represents activities that are increasingly more difficult (i.e. Bed mobility, Transfers, Gait). Score 24 23 22-20 19-15 14-10 9-7 6 Modifier CH CI CJ CK CL CM CN   
 
? Mobility - Walking and Moving Around:  
  - CURRENT STATUS: CL - 60%-79% impaired, limited or restricted  - GOAL STATUS: CK - 40%-59% impaired, limited or restricted  - D/C STATUS:  ---------------To be determined--------------- Payor: SELF PAY / Plan: [facility] SELF PAY / Product Type: Self Pay /   
 
Medical Necessity:    
· Patient is expected to demonstrate progress in strength, range of motion, balance, coordination and functional technique to decrease assistance required with gait, transfers, and functional mobility. Theresa More Reason for Services/Other Comments: 
· Patient continues to require skilled intervention due to decreased strength, decreased balance, decreased functional tolerance, decreased cardiopulmonary endurance affecting participation in basic ADLs and functional tasks. Use of outcome tool(s) and clinical judgement create a POC that gives a: Clear prediction of patient's progress: LOW COMPLEXITY  
  
 
 
 
TREATMENT:  
(In addition to Assessment/Re-Assessment sessions the following treatments were rendered) Pre-treatment Symptoms/Complaints: Patient had no complaints of pain during therapy. Pain: Initial: Pain Intensity 1: 3  Post Session: 0/10 Gait Training ( 15 minutes):  Gait training to improve and/or restore physical functioning as related to mobility, strength and balance. Ambulated 250 Feet (ft) with Stand-by assistance using a Walker, rolling and moderate   related to their push off and heel strike to promote proper body mechanics. Instruction in performance of leading with his knee during swing through to correct gait. Therapeutic Exercise: ( 10 minutes):  Exercises per grid below to improve mobility and strength. Required minimal visual and verbal cues to promote proper body mechanics. Progressed complexity of movement as indicated. Date: 
11/27/18 Date: 
12/3/18 Date: 
12/5 Activity/Exercise Parameters Parameters Parameters Ankle pumps 15 X B   2 x 10 Quad set 15 X B  Standing 5x L Glute set 15 X Heel slides 10 X B AA R  2 x 10 Hip abduction 10 X B AA R  2 x 10 Standing knee bends  10x R Standing calf raises  10x B 
5x2 B single Standing marching  10x B 2 x 10 Braces/Orthotics/Lines/Etc:  
· IV 
· drain wound vac · O2 Device: Room air Treatment/Session Assessment:   
· Response to Treatment:  See above · Interdisciplinary Collaboration:  
o Physical Therapist 
o Registered Nurse · After treatment position/precautions:  
o Supine in bed 
o Bed/Chair-wheels locked 
o Bed in low position 
o Call light within reach 
o RN notified · Compliance with Program/Exercises: Compliant all of the time · Recommendations/Intent for next treatment session: \"Next visit will focus on advancements to more challenging activities and reduction in assistance provided\". Total Treatment Duration: PT Patient Time In/Time Out Time In: 1215 Time Out: 1239 Ellen Brand [place]

## 2018-12-05 NOTE — PROGRESS NOTES
Hospitalist Progress Note   
2018 Admit Date: 2018 10:58 AM  
NAME: Ellen Diez :  1975 MRN:  [id number] Attending: Ankita Slaughter DO 
PCP:  None SUBJECTIVE:  
Ellen Diez is a 45 yo M with heroin abuse who was admitted with R thigh abscess. He is s/p surgical debridement on  with wound vac in place. Blood and wound cultures negative. Was on vanc and zosyn initially; now on zosyn only per ID. Hgb dropped to 7 and given 1 unit pRBC on  with improvement to 8.5. Plan is for IV abx until  followed by PO. Self pay so will complete IV course here.  - pt feels good. No new complaints. RLE pain improved. Ambulating. PHYSICAL EXAM  
 
Visit Vitals /84 Pulse 75 Temp 98.3 °F (36.8 °C) Resp 18 Ht 6' (1.829 m) Wt 68 kg (150 lb) SpO2 99% BMI 20.34 kg/m² Temp (24hrs), Av.4 °F (36.9 °C), Min:98.2 °F (36.8 °C), Max:98.7 °F (37.1 °C) Patient Vitals for the past 24 hrs: 
 Temp Pulse Resp BP SpO2  
18 0715 98.3 °F (36.8 °C) 75 18 127/84 99 % 18 0434 98.2 °F (36.8 °C) 75 18 130/84 98 % 18 0024 98.2 °F (36.8 °C) 80 18 139/89 98 % 18 1950 98.3 °F (36.8 °C) 83 18 137/87 98 % 18 1539 98.7 °F (37.1 °C) 85 18 128/86 99 % 18 1120 98.5 °F (36.9 °C) 82 18 124/79 100 % Oxygen Therapy O2 Sat (%): 99 % (18 0715) Pulse via Oximetry: 82 beats per minute (18 1340) O2 Device: Room air (18 0232) Intake/Output Summary (Last 24 hours) at 2018 6370 Last data filed at 2018 5408 Gross per 24 hour Intake 2051 ml Output 1025 ml Net 1026 ml General: No acute distress   
Extremities: No cyanosis, clubbing or edema, wound vac in place to R thigh. R knee NTTP, mobile without pain. No erythema Neurologic:  No focal deficits No results found for this or any previous visit (from the past 24 hour(s)). All Micro Results Procedure Component Value Units Date/Time Aida Maier [[id number]] Collected:  11/25/18 1220 Order Status:  Completed Specimen:  Abscess Updated:  12/03/18 5802 Special Requests: --     
  RIGHT THIGH Culture result:    
  NO ANAEROBES ISOLATED 7 DAYS  
     
 CULTURE, BLOOD [[id number]] Collected:  11/24/18 1351 Order Status:  Completed Specimen:  Whole Blood Updated:  11/29/18 0730 Special Requests: --     
  RIGHT JUGULAR VEIN Culture result: NO GROWTH 5 DAYS     
 CULTURE, BLOOD [[id number]] Collected:  11/24/18 1400 Order Status:  Completed Specimen:  Blood Updated:  11/29/18 0730 Special Requests: NO SPECIAL REQUESTS Culture result: NO GROWTH 5 DAYS     
 MSSA/MRSA SC BY PCR, NASAL SWAB [[id number]]  (Abnormal) Collected:  11/27/18 1222 Order Status:  Completed Specimen:  Nasal Updated:  11/27/18 1428 Special Requests: NO SPECIAL REQUESTS Culture result: MRSA target DNA not detected, SA target DNA detected. A MRSA negative, SA positive test result does not preclude MRSA nasal colonization. April Carwin STAIN [[id number]] Collected:  11/25/18 1220 Order Status:  Completed Specimen:  Wound from Abscess Updated:  11/27/18 9204 Special Requests: --     
  RIGHT THIGH 
  
  GRAM STAIN NO DEFINITE ORGANISM SEEN     
   20 TO 30 WBC'S PER OIF Culture result: NO GROWTH 2 DAYS Imaging last 24 hours:   
No results found. ASSESSMENT Hospital Problems as of 12/5/2018 Never Reviewed Codes Class Noted - Resolved POA Polysubstance abuse (HCC) (Chronic) ICD-10-CM: F19.10 ICD-9-CM: 305.90  11/28/2018 - Present Yes Other insomnia (Chronic) ICD-10-CM: G47.09 
ICD-9-CM: 780.52  11/27/2018 - Present Yes * (Principal) Necrotizing fasciitis ([facility] ICD-10-CM: M72.6 ICD-9-CM: 728.86  11/26/2018 - Present Yes  RESOLVED: Acute blood loss anemia ICD-10-CM: D62 
 ICD-9-CM: 285.1  11/29/2018 - 12/3/2018 Yes RESOLVED: Abscess of thigh ICD-10-CM: L02.419 ICD-9-CM: 682.6  11/24/2018 - 12/3/2018 Yes Plan: · R thigh abscess/necrotizing fasciitis-  
· s/p excisional debridement of R thigh on 11/25. · Per final ID note, \"Continue Zosyn until 12/9/18, then dc home on 2 weeks of augmentin. He can f/u with Wound Care. He establishes a new PCP in January when his insurance kicks in. \" 
· Wound vac · Wean off IV narcotics · ABL Anemia-  
· s/p 1 unit pRBC · monitor · Insomnia-  
· improved with Restoril; continue. · Polysubstance abuse-  
· Am weaning off IV narcotics. DVT Prophylaxis: SCDs Signed By: Peter Conway MD   
 December 5, 2018

## 2018-12-05 NOTE — PROGRESS NOTES
Problem: Nutrition Deficit Goal: *Optimize nutritional status Nutrition F/U.   
  
Assessment:  
Diet Order:  Regular with double meat portions and Ensure Enlive bid Food/Nutrition and Pertinent History:  Per wound csre RN wound is healing well. Pt reports good appetite and that he is eating 100% of meal and enjoying the double portion of meat. He likes the Ensure [place] and is drinking about 1 bottle a day. He says he has a case of Ensure at home that someone gave him and now that he knows he likes it, he could drink it when he goes home.  
Macronutrient needs: EER: 1218-7119 kcal/day (30-35 kcal/kg actual BW) EPR: 102-136 g/day (1.5-2.0 grams/kg actual BW) Intake/Comparative Standards:  Average intake for past 7 day(s)/4 recorded meal(s): 100%. This potentially meets ~100% of kcal and ~100% of protein needs which includes nutritional value of double meat portion and 1 Ensure Enlive daily Intervention: 
Meals and snacks: Continue regular diet and double meat/protein portions on all meal trays. Nutrition supplement therapy: Continue Ensure Enlive BID. Education/Discharge nutrition plan: Discussed importance of nutrition and wound healing and instructed to continue double protein/meat intake and 1 bottle of Ensure/d at home. Identified high protein foods. Pt voices understanding. Expect good compliance.   
 
Leona Finder, [address], [address] [place] [address], [address] [place] [address]

## 2018-12-06 ENCOUNTER — HOME HEALTH ADMISSION (OUTPATIENT)
Dept: HOME HEALTH SERVICES | Facility: HOME HEALTH | Age: 43
End: 2018-12-06
Payer: SELF-PAY

## 2018-12-06 PROCEDURE — 74011250636 HC RX REV CODE- 250/636: Performed by: INTERNAL MEDICINE

## 2018-12-06 PROCEDURE — 74011250637 HC RX REV CODE- 250/637: Performed by: SURGERY

## 2018-12-06 PROCEDURE — 74011250637 HC RX REV CODE- 250/637: Performed by: INTERNAL MEDICINE

## 2018-12-06 PROCEDURE — 65270000029 HC RM PRIVATE

## 2018-12-06 PROCEDURE — 74011000258 HC RX REV CODE- 258: Performed by: INTERNAL MEDICINE

## 2018-12-06 RX ADMIN — HYDROCODONE BITARTRATE AND ACETAMINOPHEN 1 TABLET: 5; 325 TABLET ORAL at 03:37

## 2018-12-06 RX ADMIN — HYDROMORPHONE HYDROCHLORIDE 0.5 MG: 1 INJECTION, SOLUTION INTRAMUSCULAR; INTRAVENOUS; SUBCUTANEOUS at 00:16

## 2018-12-06 RX ADMIN — PIPERACILLIN SODIUM,TAZOBACTAM SODIUM 3.38 G: 3; .375 INJECTION, POWDER, FOR SOLUTION INTRAVENOUS at 02:21

## 2018-12-06 RX ADMIN — Medication 10 ML: at 22:00

## 2018-12-06 RX ADMIN — Medication 10 ML: at 14:33

## 2018-12-06 RX ADMIN — HYDROCODONE BITARTRATE AND ACETAMINOPHEN 1 TABLET: 10; 325 TABLET ORAL at 16:59

## 2018-12-06 RX ADMIN — HYDROCODONE BITARTRATE AND ACETAMINOPHEN 1 TABLET: 10; 325 TABLET ORAL at 12:14

## 2018-12-06 RX ADMIN — PIPERACILLIN SODIUM,TAZOBACTAM SODIUM 3.38 G: 3; .375 INJECTION, POWDER, FOR SOLUTION INTRAVENOUS at 10:25

## 2018-12-06 RX ADMIN — HYDROMORPHONE HYDROCHLORIDE 0.5 MG: 1 INJECTION, SOLUTION INTRAMUSCULAR; INTRAVENOUS; SUBCUTANEOUS at 21:10

## 2018-12-06 RX ADMIN — HYDROMORPHONE HYDROCHLORIDE 0.5 MG: 1 INJECTION, SOLUTION INTRAMUSCULAR; INTRAVENOUS; SUBCUTANEOUS at 06:27

## 2018-12-06 RX ADMIN — HYDROMORPHONE HYDROCHLORIDE 0.5 MG: 1 INJECTION, SOLUTION INTRAMUSCULAR; INTRAVENOUS; SUBCUTANEOUS at 14:33

## 2018-12-06 RX ADMIN — PIPERACILLIN SODIUM,TAZOBACTAM SODIUM 3.38 G: 3; .375 INJECTION, POWDER, FOR SOLUTION INTRAVENOUS at 17:00

## 2018-12-06 RX ADMIN — HYDROCODONE BITARTRATE AND ACETAMINOPHEN 1 TABLET: 10; 325 TABLET ORAL at 08:32

## 2018-12-06 NOTE — PROGRESS NOTES
· Dispo update:  Ordered [facility] wound vac, and also Kostas Brock. RN (order, referral, and face to face placed into [facility]/CC). Anticipated discharge is Sunday December 9, 2018 after last dose of Zosyn (then home on 2 weeks of Augmentin).

## 2018-12-06 NOTE — PROGRESS NOTES
Hospitalist Progress Note   
2018 Admit Date: 2018 10:58 AM  
NAME: Tone Pina :  1975 MRN:  [id number] Attending: Tao Sheppard DO 
PCP:  None SUBJECTIVE:  
Tone Pina is a 45 yo M with heroin abuse who was admitted with R thigh abscess. He is s/p surgical debridement on  with wound vac in place. Blood and wound cultures negative. Was on vanc and zosyn initially; now on zosyn only per ID. Hgb dropped to 7 and given 1 unit pRBC on  with improvement to 8.5. Plan is for IV abx until  followed by PO. Self pay so will complete IV course here.  - pt feels good. No new complaints. RLE pain resolved. Ambulating. PHYSICAL EXAM  
 
Visit Vitals BP (!) 138/97 Comment: notified RN  
Pulse 85 Temp 99.8 °F (37.7 °C) Resp 18 Ht 6' (1.829 m) Wt 68 kg (150 lb) SpO2 99% BMI 20.34 kg/m² Temp (24hrs), Av °F (37.2 °C), Min:98.5 °F (36.9 °C), Max:99.8 °F (37.7 °C) Patient Vitals for the past 24 hrs: 
 Temp Pulse Resp BP SpO2  
18 1206 99.8 °F (37.7 °C) 85 18 (!) 138/97 99 % 18 0825 98.6 °F (37 °C) 81 17 138/88 100 % 18 0339 98.5 °F (36.9 °C) 83 16 146/89 99 % 18 2352 98.8 °F (37.1 °C) 78 16 (!) 151/94 100 % 18 1951 99 °F (37.2 °C) 80 16 143/89 98 % 18 1558 99.5 °F (37.5 °C) 77 16 145/90 99 % Oxygen Therapy O2 Sat (%): 99 % (18 1206) Pulse via Oximetry: 82 beats per minute (18 1340) O2 Device: Room air (18 0232) Intake/Output Summary (Last 24 hours) at 2018 1239 Last data filed at 2018 1206 Gross per 24 hour Intake 665 ml Output 950 ml Net -285 ml General: No acute distress   
Extremities: No cyanosis, clubbing or edema, wound vac in place to R thigh. R knee NTTP, mobile without pain. No erythema Neurologic:  No focal deficits No results found for this or any previous visit (from the past 24 hour(s)). All Micro Results Procedure Component Value Units Date/Time Tye Rosen [[id number]] Collected:  11/25/18 1220 Order Status:  Completed Specimen:  Abscess Updated:  12/03/18 2645 Special Requests: --     
  RIGHT THIGH Culture result:    
  NO ANAEROBES ISOLATED 7 DAYS  
     
 CULTURE, BLOOD [[id number]] Collected:  11/24/18 1351 Order Status:  Completed Specimen:  Whole Blood Updated:  11/29/18 0730 Special Requests: --     
  RIGHT JUGULAR VEIN Culture result: NO GROWTH 5 DAYS     
 CULTURE, BLOOD [[id number]] Collected:  11/24/18 1400 Order Status:  Completed Specimen:  Blood Updated:  11/29/18 0730 Special Requests: NO SPECIAL REQUESTS Culture result: NO GROWTH 5 DAYS     
 MSSA/MRSA SC BY PCR, NASAL SWAB [[id number]]  (Abnormal) Collected:  11/27/18 1222 Order Status:  Completed Specimen:  Nasal Updated:  11/27/18 1428 Special Requests: NO SPECIAL REQUESTS Culture result: MRSA target DNA not detected, SA target DNA detected. A MRSA negative, SA positive test result does not preclude MRSA nasal colonization. Shelbie Lacy STAIN [[id number]] Collected:  11/25/18 1220 Order Status:  Completed Specimen:  Wound from Abscess Updated:  11/27/18 0285 Special Requests: --     
  RIGHT THIGH 
  
  GRAM STAIN NO DEFINITE ORGANISM SEEN     
   20 TO 30 WBC'S PER OIF Culture result: NO GROWTH 2 DAYS Imaging last 24 hours:   
No results found. ASSESSMENT Hospital Problems as of 12/6/2018 Never Reviewed Codes Class Noted - Resolved POA Polysubstance abuse (HCC) (Chronic) ICD-10-CM: F19.10 ICD-9-CM: 305.90  11/28/2018 - Present Yes Other insomnia (Chronic) ICD-10-CM: G47.09 
ICD-9-CM: 780.52  11/27/2018 - Present Yes * (Principal) Necrotizing fasciitis ([facility] ICD-10-CM: M72.6 ICD-9-CM: 728.86  11/26/2018 - Present Yes  RESOLVED: Acute blood loss anemia ICD-10-CM: D62 
 ICD-9-CM: 285.1  11/29/2018 - 12/3/2018 Yes RESOLVED: Abscess of thigh ICD-10-CM: L02.419 ICD-9-CM: 682.6  11/24/2018 - 12/3/2018 Yes Plan: · R thigh abscess/necrotizing fasciitis-  
· s/p excisional debridement of R thigh on 11/25. · Per final ID note, \"Continue Zosyn until 12/9/18, then dc home on 2 weeks of augmentin. He can f/u with Wound Care. He establishes a new PCP in January when his insurance kicks in. \" 
· Wound vac · Wean off IV narcotics · ABL Anemia-  
· s/p 1 unit pRBC · monitor · Insomnia-  
· improved with Restoril; continue. · Polysubstance abuse-  
· Am weaning off IV narcotics. DVT Prophylaxis: SCDs Signed By: Susan Lesches, MD   
 December 6, 2018

## 2018-12-06 NOTE — PROGRESS NOTES
END OF SHIFT NOTE: 
 
INTAKE/OUTPUT 
12/04 0701 - 12/05 0700 In: 2199 [P.O.:1770; I.V.:429] Out: 1125 [N:[id number]] Voiding: YES Catheter: NO 
Drain:  
Vacuum Assisted Closure Anterior;Right;Upper Leg (Active) Vac Status Suction, continuous 12/4/2018  7:30 PM  
Suction (mmHg) 125 12/4/2018  1:05 PM  
Site Assessment Clean, dry, & intact 12/4/2018  7:30 PM  
Dressing Status Clean, dry, & intact 12/4/2018  7:30 PM  
Drainage Description Serosanguinous 12/4/2018  7:30 PM  
Drainage Chamber Level (ml) 10 ml 12/4/2018  1:05 PM  
[place] Changed No 12/4/2018  1:05 PM  
Output (ml) 0 ml 12/3/2018 10:00 AM  
 
 
 
 
 
 
Flatus: Patient does have flatus present. Stool:  0 occurrences. Characteristics: 
Stool Assessment Stool Appearance: Soft Emesis: 0 occurrences. Characteristics: VITAL SIGNS Patient Vitals for the past 12 hrs: 
 Temp Pulse Resp BP SpO2  
12/05/18 1558 99.5 °F (37.5 °C) 77 16 145/90 99 % 12/05/18 1130 97.7 °F (36.5 °C) 78 18 (!) 142/91 100 % Pain Assessment Pain Intensity 1: 9 (12/05/18 1443) Pain Location 1: Leg 
Pain Intervention(s) 1: Medication (see MAR) Patient Stated Pain Goal: 0 Ambulating Yes,with therapy Shift report given to oncoming nurse at the bedside.  
 
Heather Ibrahim RN

## 2018-12-06 NOTE — PROGRESS NOTES
[facility] - INPATIENT Face to Face Encounter Patients Name: Lillie Barfield    YOB: 1975 Ordering Physician: Dr. Sofy Vail MD  
 
Primary Diagnosis: Abscess of thigh Date of Face to Face:   12/6/2018 Face to Face Encounter findings are related to primary reason for home care:   yes. 1. I certify that the patient needs intermittent care as follows: skilled nursing care:  teaching/training of wound vac dressing changes 2. I certify that this patient is homebound, that is: 1) patient requires the use of a no assistive device  device, special transportation, or assistance of another to leave the home; or 2) patient's condition makes leaving the home medically contraindicated; and 3) patient has a normal inability to leave the home and leaving the home requires considerable and taxing effort. Patient may leave the home for infrequent and short duration for medical reasons, and occasional absences for non-medical reasons. Homebound status is due to the following functional limitations: Patient currently under activity restrictions secondary to recent surgical procedure, this hinders their ability to safely leave the home. 3. I certify that this patient is under my care and that I, or a nurse practitioner or  [id number], or clinical nurse specialist, or certified nurse midwife, working with me, had a Face-to-Face Encounter that meets the physician Face-to-Face Encounter requirements. The following are the clinical findings from the [address] [place] [address] encounter that support the need for skilled services and is a summary of the encounter: see hospital chart See hospital chart Cyndi Velasco RN 
12/6/2018 THE FOLLOWING TO BE COMPLETED BY THE COMMUNITY PHYSICIAN: 
 
I concur with the findings described above from the [facility] encounter that this patient is homebound and in need of a skilled service. Certifying Physician: _____________________________________ Printed Certifying Physician Name: _____________________________________ Date: _________________

## 2018-12-06 NOTE — PROGRESS NOTES
[address] December 6, 2018 RE: Ellen Diez To Whom It May Concern, This is to certify that Mr. Ellen Diez is 80 percent elly at [facility].  He will need a elly wound vac ([facility] negative pressure wound therapy system) for home use. Failure to use a wound vac may result in complications such as delayed wound healing and increased risk of infection. Please feel free to contact my office if you have any questions or concerns. Thank you for your assistance in this matter. Sincerely, 
Emmet Nissen, RN  
 
[phone]

## 2018-12-07 PROCEDURE — 65270000029 HC RM PRIVATE

## 2018-12-07 PROCEDURE — 74011000258 HC RX REV CODE- 258: Performed by: INTERNAL MEDICINE

## 2018-12-07 PROCEDURE — 74011250637 HC RX REV CODE- 250/637: Performed by: INTERNAL MEDICINE

## 2018-12-07 PROCEDURE — 74011250637 HC RX REV CODE- 250/637: Performed by: SURGERY

## 2018-12-07 PROCEDURE — 97605 NEG PRS WND THER DME<=50SQCM: CPT

## 2018-12-07 PROCEDURE — 97530 THERAPEUTIC ACTIVITIES: CPT

## 2018-12-07 PROCEDURE — 74750000023 HC WOUND THERAPY

## 2018-12-07 PROCEDURE — 77030019934 HC DRSG VAC ASST KCON -B

## 2018-12-07 PROCEDURE — 74011250636 HC RX REV CODE- 250/636: Performed by: INTERNAL MEDICINE

## 2018-12-07 RX ORDER — HYDROMORPHONE HYDROCHLORIDE 2 MG/1
1 TABLET ORAL
Status: DISCONTINUED | OUTPATIENT
Start: 2018-12-07 | End: 2018-12-08 | Stop reason: HOSPADM

## 2018-12-07 RX ORDER — ACETAMINOPHEN 325 MG/1
650 TABLET ORAL
Status: DISCONTINUED | OUTPATIENT
Start: 2018-12-07 | End: 2018-12-08 | Stop reason: HOSPADM

## 2018-12-07 RX ORDER — HYDROCODONE BITARTRATE AND ACETAMINOPHEN 10; 325 MG/1; MG/1
1 TABLET ORAL
Status: DISCONTINUED | OUTPATIENT
Start: 2018-12-07 | End: 2018-12-08 | Stop reason: HOSPADM

## 2018-12-07 RX ADMIN — HYDROMORPHONE HYDROCHLORIDE 1 MG: 2 TABLET ORAL at 14:47

## 2018-12-07 RX ADMIN — PIPERACILLIN SODIUM,TAZOBACTAM SODIUM 3.38 G: 3; .375 INJECTION, POWDER, FOR SOLUTION INTRAVENOUS at 09:47

## 2018-12-07 RX ADMIN — HYDROMORPHONE HYDROCHLORIDE 1 MG: 2 TABLET ORAL at 22:23

## 2018-12-07 RX ADMIN — Medication 10 ML: at 18:22

## 2018-12-07 RX ADMIN — PIPERACILLIN SODIUM,TAZOBACTAM SODIUM 3.38 G: 3; .375 INJECTION, POWDER, FOR SOLUTION INTRAVENOUS at 18:15

## 2018-12-07 RX ADMIN — Medication 10 ML: at 22:00

## 2018-12-07 RX ADMIN — HYDROMORPHONE HYDROCHLORIDE 0.5 MG: 1 INJECTION, SOLUTION INTRAMUSCULAR; INTRAVENOUS; SUBCUTANEOUS at 11:05

## 2018-12-07 RX ADMIN — HYDROCODONE BITARTRATE AND ACETAMINOPHEN 1 TABLET: 10; 325 TABLET ORAL at 18:22

## 2018-12-07 RX ADMIN — HYDROCODONE BITARTRATE AND ACETAMINOPHEN 1 TABLET: 10; 325 TABLET ORAL at 08:05

## 2018-12-07 RX ADMIN — HYDROMORPHONE HYDROCHLORIDE 0.5 MG: 1 INJECTION, SOLUTION INTRAMUSCULAR; INTRAVENOUS; SUBCUTANEOUS at 03:55

## 2018-12-07 RX ADMIN — HYDROCODONE BITARTRATE AND ACETAMINOPHEN 1 TABLET: 10; 325 TABLET ORAL at 01:34

## 2018-12-07 RX ADMIN — PIPERACILLIN SODIUM,TAZOBACTAM SODIUM 3.38 G: 3; .375 INJECTION, POWDER, FOR SOLUTION INTRAVENOUS at 01:35

## 2018-12-07 NOTE — PROGRESS NOTES
Problem: Mobility Impaired (Adult and Pediatric) Goal: *Acute Goals and Plan of Care (Insert Text) STG: ALL SHORT TERM GOALS MET 11/29/2018 
(1.)Mr. Deana Deleon will move from supine to sit and sit to supine  with CONTACT GUARD ASSIST within 3 treatment day(s). (2.)Mr. Deana Deleon will transfer from bed to chair and chair to bed with CONTACT GUARD ASSIST using the least restrictive device within 3 treatment day(s). (3.)Mr. Deana Deleon will ambulate with CONTACT GUARD ASSIST for 30 feet with the least restrictive device within 3 treatment day(s). LTG:  All LTG's met 12/7/2018  
(1.)Mr. Deana Deleon will move from supine to sit and sit to supine  in bed with INDEPENDENT within 7 treatment day(s). (2.)Mr. Deana Deleon will transfer from bed to chair and chair to bed with MODIFIED INDEPENDENT using the least restrictive device within 7 treatment day(s). (3.)Mr. Deana Deleon will ambulate with STAND BY ASSIST for 250+ feet with the least restrictive device within 7 treatment day(s). ________________________________________________________________________________________________ PHYSICAL THERAPY: Daily Note, Discharge, Treatment Day: 5th, PM 12/7/2018INPATIENT: Hospital Day: 14 
Payor: SELF PAY / Plan: [facility] SELF PAY / Product Type: Self Pay /  
  
NAME/AGE/GENDER: Vicenta Officer is a 44 y.o. male PRIMARY DIAGNOSIS: Abscess of thigh Necrotizing fasciitis ([address] Necrotizing fasciitis ([address] Procedure(s) (LRB): 
I&D OF LEFT THIGH ABSCESS (Right) 12 Days Post-Op ICD-10: Treatment Diagnosis:  
 · Generalized Muscle Weakness (M62.81) · Difficulty in walking, Not elsewhere classified (R26.2) · History of falling (Z91.81) Precaution/Allergies: 
Patient has no known allergies. ASSESSMENT:  
Mr. Deana Deleon supine in bed agreeable to have therapy, family at bedside. Still has IV and wound vac on RLE thigh. Patient reports that he gets up in room ad magnus.   He is  Independent with bed mobility with good sitting balance. Stands independently and ambulated 500' in hallway independently, PT carried wound vac. Negotiated 4 steps with handrails independently with cueing for sequencing. He returned to his bed and wanted to rest due to wound vac changed today. Mr. Ponce Rangel making progress and has met all his goals. Will discontinue skilled PT. Encouraged patient and wife to ambulate in halls over the weekend with RN set up. This section established at most recent assessment PROBLEM LIST (Impairments causing functional limitations): 1. Decreased Strength 2. Decreased ADL/Functional Activities 3. Decreased Transfer Abilities 4. Decreased Ambulation Ability/Technique 5. Decreased Balance 6. Increased Pain 7. Decreased Activity Tolerance 8. Increased Fatigue 9. Decreased Flexibility/Joint Mobility 10. Decreased [place] with Home Exercise Program 
 INTERVENTIONS PLANNED: (Benefits and precautions of physical therapy have been discussed with the patient.) 1. Balance Exercise 2. Bed Mobility 3. Family Education 4. Gait Training 5. Home Exercise Program (HEP) 6. Neuromuscular Re-education/Strengthening 7. Range of Motion (ROM) 8. Therapeutic Activites 9. Therapeutic Exercise/Strengthening 10. Transfer Training TREATMENT PLAN: Frequency/Duration: 3 times a week for duration of hospital stay Rehabilitation Potential For Stated Goals: Good RECOMMENDED REHABILITATION/EQUIPMENT: (at time of discharge pending progress): Due to the probability of continued deficits (see above) this patient will likely need continued skilled physical therapy after discharge. Equipment:  
? None at this time HISTORY:  
History of Present Injury/Illness (Reason for Referral): 
See H&P below Erica Campos is a 44 y.o. male with a history of GERD and heroin abuse presents with pain in his right hip region for the past few weeks. Lonn Finger admits to falling 4 days prior to the development of pain.  He reports subjective fevers.  He states now it is very difficult to walk due to pain.  Denies similar symptoms in the past.  No radiation of pain.  Patient is quite a poor historian.  Reviewed chart and noted that he had been seen in the emergency department November 5, 12th, and 18th.  November 5th, he was seen for fever, Nov 12 for abdominal pain and heroin use, November 18 for the right hip pain.   At that visit he was found to have significant leukocytosis and after further search was found to have a large left arm abscess that was drained.  He was prescribed clindamycin.  Patient states he took all of this antibiotic, but interestingly, it was only prescribed 6 days ago. Myron Flores admits that he \"overtook it. \" Bola Holly also never returned for packing removal or dressing change. Lacygumaroberenice Ivánromulo states he has been too focused on the pain in his right hip. No prior hip surgeries. He states last heroin use was 4 days ago and snorts it. Bola Holly denies any IV drug use. Past Medical History/Comorbidities:  
Mr. Spencer Almeida  has a past medical history of GERD (gastroesophageal reflux disease), Necrotizing fasciitis ([address] and Other insomnia. Mr. Spencer Almeida  has a past surgical history that includes hx other surgical and I&D OF LEFT THIGH ABSCESS (Right, 11/25/2018). Social History/Living Environment:  
Home Environment: Private residence # Steps to Enter: 3 One/Two Story Residence: One story Living Alone: No 
Support Systems: Spouse/Significant Other/Partner Patient Expects to be Discharged to[de-identified] Private residence Current DME Used/Available at Home: None Prior Level of Function/Work/Activity: 
Independent with all mobility at baseline, 1 fall Number of Personal Factors/Comorbidities that affect the Plan of Care: 1-2: MODERATE COMPLEXITY EXAMINATION:  
Most Recent Physical Functioning:  
Gross Assessment: 
  
         
  
Posture: 
  
Balance: 
Sitting: Intact Standing: Intact Bed Mobility: 
Rolling: Independent Supine to Sit: Independent Sit to Supine: Independent Wheelchair Mobility: 
  
Transfers: 
Sit to Stand: Independent Stand to Sit: Independent Duration: 12 Minutes Gait: 
  
Distance (ft): 500 Feet (ft) Ambulation - Level of Assistance: Independent Number of Stairs Trained: 4 Stairs - Level of Assistance: Modified independent Rail Use: Both Body Structures Involved: 1. Nerves 2. Heart 3. Lungs 4. Bones 5. Joints 6. Muscles 7. Ligaments Body Functions Affected: 1. Sensory/Pain 2. Cardio 3. Respiratory 4. Neuromusculoskeletal 
5. Movement Related 6. Skin Related Activities and Participation Affected: 1. General Tasks and Demands 2. Mobility 3. Self Care 4. Domestic Life 5. Interpersonal Interactions and Relationships 6. Community, Social and [place] Number of elements that affect the Plan of Care: 4+: HIGH COMPLEXITY CLINICAL PRESENTATION:  
Presentation: Evolving clinical presentation with changing clinical characteristics: MODERATE COMPLEXITY CLINICAL DECISION [address] [place] [address] AM-PAC 6 Clicks Basic Mobility Inpatient Short Form How much difficulty does the patient currently have. .. Unable A Lot A Little None 1. Turning over in bed (including adjusting bedclothes, sheets and blankets)? [] 1   [] 2   [] 3   [x] 4  
2. Sitting down on and standing up from a chair with arms ( e.g., wheelchair, bedside commode, etc.)   [] 1   [] 2   [] 3   [x] 4  
3. Moving from lying on back to sitting on the side of the bed? [] 1   [] 2   [] 3   [x] 4 How much help from another person does the patient currently need. .. Total A Lot A Little None 4. Moving to and from a bed to a chair (including a wheelchair)? [] 1   [] 2   [] 3   [x] 4  
5. Need to walk in hospital room? [] 1   [] 2   [] 3   [x] 4  
6. Climbing 3-5 steps with a railing? [] 1   [] 2   [] 3   [x] 4  
© , Trustees of [address] [place] [id number], under license to [employer]. All rights reserved Score:  Initial: 14 Most Recent:24 (Date:12/7/18 ) Interpretation of Tool:  Represents activities that are increasingly more difficult (i.e. Bed mobility, Transfers, Gait). Score 24 23 22-20 19-15 14-10 9-7 6 Modifier CH CI CJ CK CL CM CN   
 
? Mobility - Walking and Moving Around:  
  - CURRENT STATUS: CL - 60%-79% impaired, limited or restricted  - GOAL STATUS: CK - 40%-59% impaired, limited or restricted  - D/C STATUS:  CH - 0% impaired, limited or restricted Payor: SELF PAY / Plan: [facility] SELF PAY / Product Type: Self Pay /   
 
Medical Necessity:    
· Patient is expected to demonstrate progress in strength, range of motion, balance, coordination and functional technique to decrease assistance required with gait, transfers, and functional mobility. Haley Christopher Reason for Services/Other Comments: · Use of outcome tool(s) and clinical judgement create a POC that gives a: Clear prediction of patient's progress: LOW COMPLEXITY  
  
 
 
 
TREATMENT:  
(In addition to Assessment/Re-Assessment sessions the following treatments were rendered) Pre-treatment Symptoms/Complaints: Patient had no complaints of pain during therapy. Pain: Initial:  
Pain Intensity 1: 3  Post Session: 3/10 Therapeutic Activity: (  12 Minutes ):  Therapeutic activities including Bed transfers, Ambulation on level ground and Stairs to improve mobility, strength and balance. Required minimal cueing   to promote correct sequencing with stair negotiation. Date: 
11/27/18 Date: 
12/3/18 Date: 
12/5 Activity/Exercise Parameters Parameters Parameters Ankle pumps 15 X B   2 x 10 Quad set 15 X B  Standing 5x L Glute set 15 X Heel slides 10 X B AA R  2 x 10 Hip abduction 10 X B AA R  2 x 10 Standing knee bends  10x R Standing calf raises  10x B 
5x2 B single Standing marching  10x B 2 x 10 Braces/Orthotics/Lines/Etc:  
· IV 
· drain wound vac · O2 Device: Room air Treatment/Session Assessment:   
· Response to Treatment:  See above · Interdisciplinary Collaboration:  
o Physical Therapist 
o Registered Nurse · After treatment position/precautions:  
o Supine in bed 
o Bed/Chair-wheels locked 
o Bed in low position 
o Call light within reach 
o Family at bedside · Compliance with Program/Exercises: Compliant all of the time · Recommendations/Intent for next treatment session: \"Next visit will focus on advancements to more challenging activities and reduction in assistance provided\". Total Treatment Duration: PT Patient Time In/Time Out Time In: 1277 Time Out: 8200 Thalia Lamas, PT, DPT

## 2018-12-07 NOTE — WOUND CARE
Wound VAC dressing change to right thigh wound, pink granular, well healing. 14x3x1.5cm. Will monitor.

## 2018-12-07 NOTE — PROGRESS NOTES
Hospitalist Progress Note 2018 Admit Date: 2018 10:58 AM  
NAME: Mario Durbin :  1975 MRN:  [id number] Attending: Nila Pretty DO 
PCP:  None SUBJECTIVE:  
Mario Durbin is a 45 yo M with heroin abuse who was admitted with R thigh abscess. He is s/p surgical debridement on  with wound vac in place. Blood and wound cultures negative. Was on vanc and zosyn initially; now on zosyn only per ID. Hgb dropped to 7 and given 1 unit pRBC on  with improvement to 8.5. Plan is for IV abx until  followed by PO. Self pay so will complete IV course here. - reports pain continuing to do better. Dilaudid has been tapered to 0.5 mg IV q6 hours. Plan for IV zosyn until . Review of Systems negative with exception of pertinent positives noted above PHYSICAL EXAM  
 
Visit Vitals /84 Pulse 84 Temp 98.4 °F (36.9 °C) Resp 18 Ht 6' (1.829 m) Wt 68 kg (150 lb) SpO2 98% BMI 20.34 kg/m² Temp (24hrs), Av.5 °F (36.9 °C), Min:98 °F (36.7 °C), Max:98.9 °F (37.2 °C) Oxygen Therapy O2 Sat (%): 98 % (18 1046) Pulse via Oximetry: 82 beats per minute (18 1340) O2 Device: Room air (18 2115) Intake/Output Summary (Last 24 hours) at 2018 1245 Last data filed at 2018 1046 Gross per 24 hour Intake 22.3 ml Output 840 ml Net -817.7 ml  
  
General: No acute distress   
Lungs:  CTA Bilaterally. Heart:  Regular rate and rhythm,  No murmur, rub, or gallop Abdomen: Soft, Non distended, Non tender, Positive bowel sounds Extremities: No cyanosis, clubbing or edema, R wound vac in place (wound care removed) and elliptical wound noted (improved from last time I saw) Neurologic:  No focal deficits No results found for this or any previous visit (from the past 24 hour(s)). All Micro Results Procedure Component Value Units Date/Time Soila Cabrera [[id number]] Collected:  18 1009 Order Status:  Completed Specimen:  Abscess Updated:  12/03/18 5841 Special Requests: --     
  RIGHT THIGH Culture result:    
  NO ANAEROBES ISOLATED 7 DAYS  
     
 CULTURE, BLOOD [[id number]] Collected:  11/24/18 1351 Order Status:  Completed Specimen:  Whole Blood Updated:  11/29/18 0730 Special Requests: --     
  RIGHT JUGULAR VEIN Culture result: NO GROWTH 5 DAYS     
 CULTURE, BLOOD [[id number]] Collected:  11/24/18 1400 Order Status:  Completed Specimen:  Blood Updated:  11/29/18 0730 Special Requests: NO SPECIAL REQUESTS Culture result: NO GROWTH 5 DAYS     
 MSSA/MRSA SC BY PCR, NASAL SWAB [[id number]]  (Abnormal) Collected:  11/27/18 1222 Order Status:  Completed Specimen:  Nasal Updated:  11/27/18 1428 Special Requests: NO SPECIAL REQUESTS Culture result: MRSA target DNA not detected, SA target DNA detected. A MRSA negative, SA positive test result does not preclude MRSA nasal colonization. Griselda Budd STAIN [[id number]] Collected:  11/25/18 1220 Order Status:  Completed Specimen:  Wound from Abscess Updated:  11/27/18 2117 Special Requests: --     
  RIGHT THIGH 
  
  GRAM STAIN NO DEFINITE ORGANISM SEEN     
   20 TO 30 WBC'S PER OIF Culture result: NO GROWTH 2 DAYS     
  
 
 
 
ASSESSMENT Hospital Problems as of 12/7/2018 Never Reviewed Codes Class Noted - Resolved POA Polysubstance abuse (HCC) (Chronic) ICD-10-CM: F19.10 ICD-9-CM: 305.90  11/28/2018 - Present Yes Other insomnia (Chronic) ICD-10-CM: G47.09 
ICD-9-CM: 780.52  11/27/2018 - Present Yes * (Principal) [facility] ICD-10-CM: M72.6 ICD-9-CM: 728.86  11/26/2018 - Present Yes RESOLVED: Acute blood loss anemia ICD-10-CM: D62 
ICD-9-CM: 285.1  11/29/2018 - 12/3/2018 Yes RESOLVED: Abscess of thigh ICD-10-CM: L02.419 ICD-9-CM: 682.6  11/24/2018 - 12/3/2018 Yes Plan: · R thigh abscess/necrotizing fasciitis-  
§ s/p excisional debridement of R thigh on 11/25. § Per final ID note, \"Continue Zosyn until 12/9/18, then dc home on 2 weeks of augmentin. He can f/u with Wound Care. He establishes a new PCP in January when his insurance kicks in. \" 
§ Wound vac on discharge § Change IV dilaudid to PO. § Augmentin for 2 weeks on discharge 
  
· ABL Anemia-  
? s/p 1 unit pRBC ? monitor 
  
· Insomnia-  
? improved with Restoril; continue. 
  
· Polysubstance abuse-  
? Stop IV dilaudid; use PO dilaudid for severe pain, norco for moderate pain. [place] Dispo- plan for home 12/9. DVT Prophylaxis: SCD Signed By: Shon Schneider MD   
 December 7, 2018

## 2018-12-08 VITALS
OXYGEN SATURATION: 98 % | HEIGHT: 72 IN | WEIGHT: 150 LBS | RESPIRATION RATE: 18 BRPM | DIASTOLIC BLOOD PRESSURE: 88 MMHG | HEART RATE: 94 BPM | SYSTOLIC BLOOD PRESSURE: 127 MMHG | BODY MASS INDEX: 20.32 KG/M2 | TEMPERATURE: 98.7 F

## 2018-12-08 PROCEDURE — 74011250637 HC RX REV CODE- 250/637: Performed by: INTERNAL MEDICINE

## 2018-12-08 PROCEDURE — 74750000023 HC WOUND THERAPY

## 2018-12-08 PROCEDURE — 74011000258 HC RX REV CODE- 258: Performed by: INTERNAL MEDICINE

## 2018-12-08 PROCEDURE — 74011250636 HC RX REV CODE- 250/636: Performed by: INTERNAL MEDICINE

## 2018-12-08 PROCEDURE — 74011250637 HC RX REV CODE- 250/637: Performed by: SURGERY

## 2018-12-08 RX ORDER — HYDROCODONE BITARTRATE AND ACETAMINOPHEN 10; 325 MG/1; MG/1
1 TABLET ORAL
Qty: 42 TAB | Refills: 0 | Status: SHIPPED | OUTPATIENT
Start: 2018-12-08 | End: 2018-12-08

## 2018-12-08 RX ORDER — IBUPROFEN 200 MG
200 TABLET ORAL
Qty: 60 TAB | Refills: 0 | Status: SHIPPED | OUTPATIENT
Start: 2018-12-08

## 2018-12-08 RX ORDER — IBUPROFEN 200 MG
1 TABLET ORAL DAILY
Qty: 30 PATCH | Refills: 0 | Status: SHIPPED | OUTPATIENT
Start: 2018-12-09 | End: 2018-12-20

## 2018-12-08 RX ORDER — AMOXICILLIN AND CLAVULANATE POTASSIUM 875; 125 MG/1; MG/1
1 TABLET, FILM COATED ORAL EVERY 12 HOURS
Qty: 30 TAB | Refills: 0 | Status: SHIPPED | OUTPATIENT
Start: 2018-12-08 | End: 2018-12-23

## 2018-12-08 RX ORDER — HYDROCODONE BITARTRATE AND ACETAMINOPHEN 10; 325 MG/1; MG/1
1 TABLET ORAL
Qty: 42 TAB | Refills: 0 | Status: SHIPPED | OUTPATIENT
Start: 2018-12-08

## 2018-12-08 RX ADMIN — HYDROMORPHONE HYDROCHLORIDE 1 MG: 2 TABLET ORAL at 13:58

## 2018-12-08 RX ADMIN — HYDROCODONE BITARTRATE AND ACETAMINOPHEN 1 TABLET: 10; 325 TABLET ORAL at 04:28

## 2018-12-08 RX ADMIN — Medication 10 ML: at 06:00

## 2018-12-08 RX ADMIN — PIPERACILLIN SODIUM,TAZOBACTAM SODIUM 3.38 G: 3; .375 INJECTION, POWDER, FOR SOLUTION INTRAVENOUS at 02:41

## 2018-12-08 RX ADMIN — HYDROMORPHONE HYDROCHLORIDE 1 MG: 2 TABLET ORAL at 08:05

## 2018-12-08 RX ADMIN — PIPERACILLIN SODIUM,TAZOBACTAM SODIUM 3.38 G: 3; .375 INJECTION, POWDER, FOR SOLUTION INTRAVENOUS at 10:18

## 2018-12-08 RX ADMIN — HYDROCODONE BITARTRATE AND ACETAMINOPHEN 1 TABLET: 10; 325 TABLET ORAL at 10:21

## 2018-12-08 NOTE — PROGRESS NOTES
Discharge teaching complete. Patient verbalized understanding of diet, activity, wound care with wound vac, home health care, s/sx as reviewed, and follow up appointment. Home wound vac and dressing supplies with patient. Right IJ d/c'd and pressure dressing applied. Rx's and discharge instructions given to patient.

## 2018-12-08 NOTE — DISCHARGE SUMMARY
Hospitalist Discharge Summary     Patient ID:  Daniel Downey  [id number]  29 y.o.  1975  Admit date: 11/24/2018 10:58 AM  Discharge date and time: 12/8/2018  Attending: Otilia Rosenberg DO  PCP:  None  Treatment Team: Attending Provider: Otilia Rosenberg DO; Consulting Provider: Otilia Rosenberg DO; Utilization Review: Kai Arias RN; Care Manager: Julio Leahy RN    Principal Diagnosis Necrotizing fasciitis [facility])   Hospital Problems as of 12/8/2018 Never Reviewed          Codes Class Noted - Resolved POA    Polysubstance abuse ([facility] [address] (Chronic) ICD-10-CM: F19.10  ICD-9-CM: 305.90  11/28/2018 - Present Yes        Other insomnia (Chronic) ICD-10-CM: G47.09  ICD-9-CM: 780.52  11/27/2018 - Present Yes        * (Principal) Necrotizing fasciitis ([facility] [address] ICD-10-CM: M72.6  ICD-9-CM: 728.86  11/26/2018 - Present Yes        RESOLVED: Acute blood loss anemia ICD-10-CM: D62  ICD-9-CM: 285.1  11/29/2018 - 12/3/2018 Yes        RESOLVED: Abscess of thigh ICD-10-CM: L02.419  ICD-9-CM: 682.6  11/24/2018 - 12/3/2018 Yes                 Hospital Course:  Please refer to the admission H&P for details of presentation. In summary, the patient is a 45 yo M with history of polysubstance abuse including heroin 'skin popping', who was admitted on 11/24/18 by the general surgery service for R thigh necrotizing fasciitis. He underwent surgical debridement on 11/25/18. He required wound vac placement due to extensive post-op wound and degree of abscess/fasciitis. ID consulted. Initially treated with IV vanc and zosyn and changed to just zosyn. Recommended 2 weeks of IV zosyn (through 12/9)  followed by 2 more weeks of PO augmentin. Blood and operative cultures remained negative. He had pain of R thigh that improved over time.   Initially requiring IV dilaudid, but this was changed to PO dilaudid and norco.  He felt norco helped the best.    He will be discharged home with home health, wound vac, and wound care.  Due to the pending weather event, patient requests to be discharged a day early, which is reasonable. Significant Diagnostic Studies:       Labs: Results:       Chemistry No results for input(s): GLU, NA, K, CL, CO2, BUN, CREA, CA, AGAP, BUCR, TBIL, GPT, AP, TP, ALB, GLOB, AGRAT in the last 72 hours. CBC w/Diff No results for input(s): WBC, RBC, HGB, HCT, PLT, GRANS, LYMPH, EOS, HGBEXT, HCTEXT, PLTEXT in the last 72 hours. Cardiac Enzymes No results for input(s): CPK, CKND1, MARIFER in the last 72 hours. No lab exists for component: CKRMB, TROIP   Coagulation No results for input(s): PTP, INR, APTT in the last 72 hours. No lab exists for component: INREXT    Lipid Panel No results found for: CHOL, CHOLPOCT, CHOLX, CHLST, CHOLV, 839466, HDL, LDL, LDLC, DLDLP, 210439, VLDLC, VLDL, TGLX, TRIGL, TRIGP, TGLPOCT, CHHD, CHHDX   BNP No results for input(s): BNPP in the last 72 hours. Liver Enzymes No results for input(s): TP, ALB, TBIL, AP, SGOT, GPT in the last 72 hours.     No lab exists for component: DBIL   Thyroid Studies No results found for: T4, T3U, TSH, TSHEXT       All Micro Results     Procedure Component Value Units Date/Time    CULTURE, ANAEROBIC [[id number]] Collected:  11/25/18 1220    Order Status:  Completed Specimen:  Abscess Updated:  12/03/18 0803     Special Requests: --        RIGHT  THIGH       Culture result:       NO ANAEROBES ISOLATED 7 DAYS          CULTURE, BLOOD [[id number]] Collected:  11/24/18 1351    Order Status:  Completed Specimen:  Whole Blood Updated:  11/29/18 0730     Special Requests: --        RIGHT  JUGULAR VEIN       Culture result: NO GROWTH 5 DAYS       CULTURE, BLOOD [[id number]] Collected:  11/24/18 1400    Order Status:  Completed Specimen:  Blood Updated:  11/29/18 0730     Special Requests: NO SPECIAL REQUESTS        Culture result: NO GROWTH 5 DAYS       MSSA/MRSA SC BY PCR, NASAL SWAB [[id number]]  (Abnormal) Collected:  11/27/18 1222    Order Status: Completed Specimen:  Nasal Updated:  11/27/18 1428     Special Requests: NO SPECIAL REQUESTS        Culture result:       MRSA target DNA not detected, SA target DNA detected. A MRSA negative, SA positive test result does not preclude MRSA nasal colonization. CULTURE, Kinjalin Oleksandr [[id number]] Collected:  11/25/18 1220    Order Status:  Completed Specimen:  Wound from Abscess Updated:  11/27/18 0806     Special Requests: --        RIGHT  THIGH       GRAM STAIN NO DEFINITE ORGANISM SEEN         20 TO 30 WBC'S PER OIF     Culture result: NO GROWTH 2 DAYS               Discharge Exam:  Visit Vitals  /87   Pulse 93   Temp 98.5 °F (36.9 °C)   Resp 17   Ht 6' (1.829 m)   Wt 68 kg (150 lb)   SpO2 100%   BMI 20.34 kg/m²     General appearance: alert, cooperative, no distress, appears stated age  Lungs: clear to auscultation bilaterally  Heart: regular rate and rhythm, S1, S2 normal, no murmur, click, rub or gallop  Abdomen: soft, non-tender. Bowel sounds normal. No masses,  no organomegaly  Extremities: no cyanosis or edema, wound vac to R thigh in place without surrounding erythema  Neurologic: Grossly normal    Disposition: home  Discharge Condition: stable  Patient Instructions:   Current Discharge Medication List      START taking these medications    Details   HYDROcodone-acetaminophen (NORCO)  mg tablet Take 1 Tab by mouth every four (4) hours as needed. Max Daily Amount: 6 Tabs. Qty: 42 Tab, Refills: 0    Associated Diagnoses: Necrotizing fasciitis ([address]      nicotine (NICODERM CQ) 21 mg/24 hr 1 Patch by TransDERmal route daily for 30 days. Qty: 30 Patch, Refills: 0      amoxicillin-clavulanate (AUGMENTIN) 875-125 mg per tablet Take 1 Tab by mouth every twelve (12) hours for 15 days. Qty: 30 Tab, Refills: 0         CONTINUE these medications which have CHANGED    Details   ibuprofen (MOTRIN) 200 mg tablet Take 1 Tab by mouth every eight (8) hours as needed for Pain.   Qty: 60 Tab, Refills: 0         STOP taking these medications       clindamycin (CLEOCIN) 300 mg capsule Comments:   Reason for Stopping:         chlordiazePOXIDE (LIBRIUM) 5 mg capsule Comments:   Reason for Stopping:         morphine IR (MS IR) 15 mg tablet Comments:   Reason for Stopping:         ondansetron (ZOFRAN ODT) 8 mg disintegrating tablet Comments:   Reason for Stopping:         mupirocin (BACTROBAN) 2 % ointment Comments:   Reason for Stopping:         chlorhexidine (HIBICLENS) 4 % liquid Comments:   Reason for Stopping:         acetaminophen-codeine (TYLENOL-CODEINE #3) 300-30 mg per tablet Comments:   Reason for Stopping:               Activity: Activity as tolerated  Diet: Regular Diet  Wound Care: Wound vac dressing change to R leg 3 times/week    SCRIPTS website reviewed regarding controlled substance prescriptions and there are no concerns found. He does have history of heroin use and counseled on this. Follow-up      Follow-up Appointments   Procedures    FOLLOW UP VISIT Appointment in: Other ([address] [place] [address]) 1) To establish with PCP within 1-2 weeks. 2) Home Health/Wound Care. 1) To establish with PCP within 1-2 weeks. 2) Home Health/Wound Care. Standing Status:   Standing     Number of Occurrences:   1     Order Specific Question:   Appointment in     Answer: Other (Specify)   ·   ·   Time spent to discharge patient 32 minutes  Signed:  Andreas Saravia.  Sarah Beltran MD  12/8/2018  11:23 AM

## 2018-12-08 NOTE — PROGRESS NOTES
Pt D/C home today with wound vac from [facility] in place and [facility] to follow up with pt at home for wound care support.

## 2018-12-08 NOTE — PROGRESS NOTES
END OF SHIFT NOTE: 
 
INTAKE/OUTPUT 
12/06 0701 - 12/07 0700 In: 315 [I.V.:315] Out: 2814 [Urine:1350; Drains:40] Voiding: YES Catheter: NO 
Drain:  
Vacuum Assisted Closure Anterior;Right;Upper Leg (Active) Vac Status Suction, continuous 12/7/2018  1:35 AM  
Suction (mmHg) 125 12/7/2018  1:35 AM  
Site Assessment Clean, dry, & intact 12/7/2018  1:35 AM  
Dressing Status Clean, dry, & intact 12/7/2018  1:35 AM  
Drainage Description Serous 12/7/2018  1:35 AM  
Drainage Chamber Level (ml) 50 ml 12/7/2018  1:35 AM  
[place] Changed No 12/7/2018  1:35 AM  
Output (ml) 25 ml 12/7/2018  1:35 AM  
 
 
 
 
 
 
Flatus: Patient does have flatus present. Stool:  0 occurrences. Characteristics: 
Stool Assessment Stool Appearance: Formed Emesis: 0 occurrences. Characteristics: VITAL SIGNS Patient Vitals for the past 12 hrs: 
 Temp Pulse Resp BP SpO2  
12/07/18 1527 99.2 °F (37.3 °C) 91 16 136/81 98 % 12/07/18 1046 98.4 °F (36.9 °C) 84 18 126/84 98 % Pain Assessment Pain Intensity 1: 7 (12/07/18 1839) Pain Location 1: Leg 
Pain Intervention(s) 1: Medication (see MAR) Patient Stated Pain Goal: 0 Ambulating Yes Shift report given to oncoming nurse at the bedside.  
 
Regine Collins RN

## 2018-12-08 NOTE — DISCHARGE INSTRUCTIONS
NUTRITION       Continue Oral Nutrition Supplement (ONS) at discharge. Recommend Ensure Enlive or a comparable/similar product once a day for 30 days. Continue to eat double portion of meat or high protein food at each meals. Tiffanie Temple RD, LD        DISCHARGE SUMMARY from Nurse    PATIENT INSTRUCTIONS:    After general anesthesia or intravenous sedation, for 24 hours or while taking prescription Narcotics:  · Limit your activities  · Do not drive and operate hazardous machinery  · Do not make important personal or business decisions  · Do  not drink alcoholic beverages  · If you have not urinated within 8 hours after discharge, please contact your surgeon on call. Report the following to your surgeon:  · Excessive pain, swelling, redness or odor of or around the surgical area  · Temperature over 100.5  · Nausea and vomiting lasting longer than 4 hours or if unable to take medications  · Any signs of decreased circulation or nerve impairment to extremity: change in color, persistent  numbness, tingling, coldness or increase pain  · Any questions    What to do at Home:  Recommended activity: Activity as tolerated and no driving while taking pain medicated and until MD states ok to drive. If you experience any of the following symptoms fever of 101 or greater, pain unrelieved by medication, redness or swelling at wound site please follow up with MD.    *  Please give a list of your current medications to your Primary Care Provider. *  Please update this list whenever your medications are discontinued, doses are      changed, or new medications (including over-the-counter products) are added. *  Please carry medication information at all times in case of emergency situations.     These are general instructions for a healthy lifestyle:    No smoking/ No tobacco products/ Avoid exposure to second hand smoke  Surgeon General's Warning:  Quitting smoking now greatly reduces serious risk to your health. Obesity, smoking, and sedentary lifestyle greatly increases your risk for illness    A healthy diet, regular physical exercise & weight monitoring are important for maintaining a healthy lifestyle    You may be retaining fluid if you have a history of heart failure or if you experience any of the following symptoms:  Weight gain of 3 pounds or more overnight or 5 pounds in a week, increased swelling in our hands or feet or shortness of breath while lying flat in bed. Please call your doctor as soon as you notice any of these symptoms; do not wait until your next office visit. Recognize signs and symptoms of STROKE:    F-face looks uneven    A-arms unable to move or move unevenly    S-speech slurred or non-existent    T-time-call 911 as soon as signs and symptoms begin-DO NOT go       Back to bed or wait to see if you get better-TIME IS BRAIN. Warning Signs of HEART ATTACK     Call 911 if you have these symptoms:   Chest discomfort. Most heart attacks involve discomfort in the center of the chest that lasts more than a few minutes, or that goes away and comes back. It can feel like uncomfortable pressure, squeezing, fullness, or pain.  Discomfort in other areas of the upper body. Symptoms can include pain or discomfort in one or both arms, the back, neck, jaw, or stomach.  Shortness of breath with or without chest discomfort.  Other signs may include breaking out in a cold sweat, nausea, or lightheadedness. Don't wait more than five minutes to call 911 - MINUTES MATTER! Fast action can save your life. Calling 911 is almost always the fastest way to get lifesaving treatment. Emergency Medical Services staff can begin treatment when they arrive -- up to an hour sooner than if someone gets to the hospital by car. The discharge information has been reviewed with the patient. The patient verbalized understanding.   Discharge medications reviewed with the patient and appropriate educational materials and side effects teaching were provided. ___________________________________________________________________________________________________________________________________         Wound Debridement: What to Expect at Home  Your Recovery  After surgery to remove debris or dead tissue from your wound (debridement), you can expect some pain and swelling around your wound. This should get better within a few days after the procedure. You may have a bandage or a moist dressing over your wound. Your doctor will let you know how long to keep it on and how often to change it. The amount of time it will take for your wound to heal depends on how serious your wound is and whether you have any other health problems that may slow healing. You may need to have the wound debrided again. How soon you can return to work and your normal routine depends on the type of work you do and how you feel. For example, if your wound is on your arm and your job requires you to do heavy lifting, you may need to wait until your wound has healed before you go back to work. This care sheet gives you a general idea about how long it will take for you to recover. But each person recovers at a different pace. Follow the steps below to feel better as quickly as possible. How can you care for yourself at home? Activity    · Rest when you feel tired. Getting enough sleep will help you recover.     · Avoid activities that put stress on the affected body part until your doctor says it's okay.     · Change positions often to keep pressure off your wound, and spread your body weight evenly with cushions, mattresses, foam wedges, or other pressure-relieving devices.     · If your wound is on your leg or foot, you may have to use crutches, a supportive boot, or a fitted shoe to keep pressure off your wound.  If you need crutches, it may help to use a backpack or wear clothes with a lot of pockets to carry items.     · Do not shower for at least 24 hours after the procedure or for as long as your doctor tells you to. When you shower, keep your dressing and wound dry.     · Do not take a bath, swim, use a hot tub, or soak your affected body part until your wound has healed. Diet    · You can eat your normal diet. If your stomach is upset, try bland, low-fat foods like plain rice, broiled chicken, toast, and yogurt.     · Eat a well-balanced diet with enough protein to help the wound heal. Protein is a fisher nutrient in helping to repair damaged tissue and promote new tissue growth. Good sources of protein are milk, yogurt, cheese, meat, and beans. Medicines    · Your doctor will tell you if and when you can restart your medicines. He or she will also give you instructions about taking any new medicines.     · If you take blood thinners, such as warfarin (Coumadin), clopidogrel (Plavix), or aspirin, be sure to talk to your doctor. He or she will tell you if and when to start taking those medicines again. Make sure that you understand exactly what your doctor wants you to do.     · Take pain medicines exactly as directed. ? If the doctor gave you a prescription medicine for pain, take it as prescribed. ? If you are not taking a prescription pain medicine, ask your doctor if you can take an over-the-counter medicine.     · If you think your pain medicine is making you sick to your stomach:  ? Take your medicine after meals (unless your doctor has told you not to). ? Ask your doctor for a different pain medicine.     · If your doctor prescribed antibiotics, take them as directed. Do not stop taking them just because you feel better. You need to take the full course of antibiotics.     · If your doctor prescribed an antibiotic ointment that you put on the wound, use it as directed.    Wound care    · A moist dressing may cover your wound. A dressing helps the wound heal and protects it.  Your doctor will tell you how to take care of this.     · If you had a skin graft, you may have a bandage that is stitched over the graft. Your doctor will remove the bandage and stitches. Other instructions    · Don't smoke. Smoking dries out the skin, reduces blood flow to the skin, and slows healing. If you need help quitting, talk to your doctor about stop-smoking programs and medicines. These can increase your chances of quitting for good. Follow-up care is a key part of your treatment and safety. Be sure to make and go to all appointments, and call your doctor if you are having problems. It's also a good idea to know your test results and keep a list of the medicines you take. When should you call for help? Call your doctor now or seek immediate medical care if:    · You have pain that does not get better after you take pain medicine.     · You have signs of infection, such as:  ? Increased pain, swelling, warmth, or redness. ? Red streaks leading from the wound. ? Pus draining from the wound. ? A fever.     · The wound starts to bleed, and blood soaks through the bandage. Oozing small amounts of blood is normal.     · You have loose stitches, or your skin graft comes loose.    Watch closely for any changes in your health, and be sure to contact your doctor if:    · The wound is not getting better as expected. Where can you learn more? Go to http://lakshmi-deysi.info/. Enter F817 in the search box to learn more about \"Wound Debridement: What to Expect at Home. \"  Current as of: November 21, 2017  Content Version: 11.8  © 5484-9675 [employer]. Care instructions adapted under license by [employer] (which disclaims liability or warranty for this information). If you have questions about a medical condition or this instruction, always ask your healthcare professional. [place] [address] any warranty or liability for your use of this information.          Vacuum-Assisted Closure for Wound Healing: Care Instructions  Your Care Instructions  When you have a wound that is hard to close your doctor may treat it with vacuum-assisted closure (VAC). VAC uses negative pressure (suction) to help bring the edges of your wound together. It also removes fluid and dead tissue from the wound area. In VAC:  · A special piece of foam or cotton gauze fits over your wound. This covers and protects the wound. A clear bandage (film dressing) goes several inches beyond the foam or gauze dressing to create a seal for the vacuum. · A tube connects the foam to a small machine called the therapy unit. The therapy unit creates the suction. · The [facility] system may be carried around (portable) or may stay in one place (stationary). VAC does not hurt. You may feel a mild pulling on the wound when treatment first starts. How long you need VAC depends on the size and type of wound you have and how well the [facility] works. You will be limited in what you can do while the wound heals. You will use VAC 24 hours a day. Follow-up care is a key part of your treatment and safety. Be sure to make and go to all appointments, and call your doctor if you are having problems. It's also a good idea to know your test results and keep a list of the medicines you take. How can you care for yourself at home? · A home health care worker will come to your home a few times a week to change the bandage and check the machine. You may need it changed more often if there is a lot of drainage. · Your doctor will give you information on what you can and can't do. This depends on where your wound is located. Your activities may be limited during the time you're using VAC. · You will be able to take sponge baths. Don't shower or take baths unless your doctor says it is okay. · Take pain medicines exactly as directed. ? If the doctor gave you a prescription medicine for pain, take it as prescribed.   ? If you are not taking a prescription pain medicine, ask your doctor if you can take an over-the-counter medicine. · If your doctor prescribed antibiotics, take them as directed. Do not stop taking them just because you feel better. You need to take the full course of antibiotics. When should you call for help? Call 911 anytime you think you may need emergency care. For example, call if:    · You have a lot of bleeding or see a sudden change in the color or texture of the drainage.     · The wound splits open and organs under the skin can be seen (evisceration).    Call your doctor now or seek immediate medical care if:    · The wound starts bleeding.     · The bandage comes off. Cover the area with a sterile bandage until you can see your doctor or your home health care worker comes by.     · You have signs of infection, such as:  ? Increased pain, swelling, warmth, or redness around the wound. ? Red streaks leading from the wound. ? Pus draining from the wound. ? A fever.    Watch closely for changes in your health, and be sure to contact your doctor if:    · The noise the machine makes changes or gets very loud. This may mean the seal is broken or the machine is not producing enough suction. Where can you learn more? Go to http://lakshmi-deysi.info/. Enter Y037 in the search box to learn more about \"Vacuum-Assisted Closure for Wound Healing: Care Instructions. \"  Current as of: November 21, 2017  Content Version: 11.8  © 1579-4890 [employer]. Care instructions adapted under license by [employer] (which disclaims liability or warranty for this information). If you have questions about a medical condition or this instruction, always ask your healthcare professional. [address] any warranty or liability for your use of this information.

## 2018-12-10 ENCOUNTER — HOME CARE VISIT (OUTPATIENT)
Dept: SCHEDULING | Facility: HOME HEALTH | Age: 43
End: 2018-12-10
Payer: SELF-PAY

## 2018-12-10 VITALS
OXYGEN SATURATION: 98 % | HEART RATE: 94 BPM | RESPIRATION RATE: 18 BRPM | TEMPERATURE: 97.8 F | SYSTOLIC BLOOD PRESSURE: 116 MMHG | DIASTOLIC BLOOD PRESSURE: 64 MMHG

## 2018-12-10 PROCEDURE — A6260 WOUND CLEANSER ANY TYPE/SIZE: HCPCS

## 2018-12-10 PROCEDURE — G0299 HHS/HOSPICE OF RN EA 15 MIN: HCPCS

## 2018-12-10 PROCEDURE — 400013 HH SOC

## 2018-12-10 PROCEDURE — A6216 NON-STERILE GAUZE<=16 SQ IN: HCPCS

## 2018-12-12 ENCOUNTER — HOME CARE VISIT (OUTPATIENT)
Dept: SCHEDULING | Facility: HOME HEALTH | Age: 43
End: 2018-12-12
Payer: SELF-PAY

## 2018-12-12 VITALS
OXYGEN SATURATION: 98 % | HEART RATE: 82 BPM | TEMPERATURE: 97.2 F | SYSTOLIC BLOOD PRESSURE: 114 MMHG | DIASTOLIC BLOOD PRESSURE: 64 MMHG | RESPIRATION RATE: 18 BRPM

## 2018-12-12 PROCEDURE — G0299 HHS/HOSPICE OF RN EA 15 MIN: HCPCS

## 2018-12-14 ENCOUNTER — HOME CARE VISIT (OUTPATIENT)
Dept: SCHEDULING | Facility: HOME HEALTH | Age: 43
End: 2018-12-14
Payer: SELF-PAY

## 2018-12-14 VITALS
HEART RATE: 87 BPM | SYSTOLIC BLOOD PRESSURE: 142 MMHG | DIASTOLIC BLOOD PRESSURE: 88 MMHG | OXYGEN SATURATION: 98 % | RESPIRATION RATE: 18 BRPM | TEMPERATURE: 97.9 F

## 2018-12-14 PROCEDURE — G0299 HHS/HOSPICE OF RN EA 15 MIN: HCPCS

## 2018-12-17 ENCOUNTER — HOME CARE VISIT (OUTPATIENT)
Dept: SCHEDULING | Facility: HOME HEALTH | Age: 43
End: 2018-12-17
Payer: SELF-PAY

## 2018-12-17 VITALS
RESPIRATION RATE: 18 BRPM | SYSTOLIC BLOOD PRESSURE: 128 MMHG | TEMPERATURE: 97.9 F | DIASTOLIC BLOOD PRESSURE: 64 MMHG | OXYGEN SATURATION: 98 % | HEART RATE: 88 BPM

## 2018-12-17 PROCEDURE — G0299 HHS/HOSPICE OF RN EA 15 MIN: HCPCS

## 2018-12-19 ENCOUNTER — HOME CARE VISIT (OUTPATIENT)
Dept: SCHEDULING | Facility: HOME HEALTH | Age: 43
End: 2018-12-19
Payer: SELF-PAY

## 2018-12-19 VITALS
SYSTOLIC BLOOD PRESSURE: 138 MMHG | DIASTOLIC BLOOD PRESSURE: 78 MMHG | TEMPERATURE: 98.4 F | RESPIRATION RATE: 18 BRPM | HEART RATE: 87 BPM | OXYGEN SATURATION: 99 %

## 2018-12-19 PROCEDURE — G0299 HHS/HOSPICE OF RN EA 15 MIN: HCPCS

## 2018-12-20 ENCOUNTER — HOSPITAL ENCOUNTER (OUTPATIENT)
Dept: WOUND CARE | Age: 43
Discharge: HOME | End: 2018-12-20
Attending: SURGERY
Payer: SELF-PAY

## 2018-12-20 VITALS
BODY MASS INDEX: 21.62 KG/M2 | TEMPERATURE: 98.5 F | HEIGHT: 72 IN | HEART RATE: 92 BPM | RESPIRATION RATE: 20 BRPM | WEIGHT: 159.6 LBS | DIASTOLIC BLOOD PRESSURE: 91 MMHG | OXYGEN SATURATION: 96 % | SYSTOLIC BLOOD PRESSURE: 141 MMHG

## 2018-12-20 DIAGNOSIS — F19.10 POLYSUBSTANCE ABUSE (HCC): Chronic | ICD-10-CM

## 2018-12-20 DIAGNOSIS — M72.6 NECROTIZING FASCIITIS (HCC): ICD-10-CM

## 2018-12-20 DIAGNOSIS — S71.101A OPEN WOUND OF RIGHT THIGH, INITIAL ENCOUNTER: ICD-10-CM

## 2018-12-20 PROCEDURE — 99215 OFFICE O/P EST HI 40 MIN: CPT

## 2018-12-20 PROCEDURE — 99204 OFFICE O/P NEW MOD 45 MIN: CPT | Performed by: SURGERY

## 2018-12-20 PROCEDURE — 97605 NEG PRS WND THER DME<=50SQCM: CPT

## 2018-12-20 PROCEDURE — 74011000250 HC RX REV CODE- 250: Performed by: SURGERY

## 2018-12-20 RX ORDER — SILVER NITRATE 38.21; 12.74 MG/1; MG/1
1 STICK TOPICAL ONCE
Status: COMPLETED | OUTPATIENT
Start: 2018-12-20 | End: 2018-12-20

## 2018-12-20 RX ADMIN — SILVER NITRATE APPLICATORS 1 APPLICATOR: 25; 75 STICK TOPICAL at 10:01

## 2018-12-20 NOTE — WOUND CARE
[address]  [address], [address]  Phone: [phone]  Fax: [phone]    Patient: Becky Khoury MRN: [id number]  SSN: [id number]    YOB: 1975  Age: 44 y.o. Sex: male       Return Appointment: 2 weeks with Mamta Davis MD    Instructions: Left lateral thigh:  Cleanse with normal saline  Negative Pressure Therapy([employer] Wound Vac, Medela or SNAP)  Black granufoam, 125mmHg, continuous. Change 3 times per week. If pump malfunctions and requires change on non-scheduled day, patient to return and have pump reapplied by clinician. Patient may take tylenol, ibuprofen, or Advil for pain. May take Zantac or Pepcid to prevent ulcers. [facility] to change wound vac three times weekly. PLACE BLACK GRANULATION FOAM INTO BASE OF WOUND      Should you experience increased redness, swelling, pain, foul odor, size of wound(s), or have a temperature over 101 degrees please contact the [address] [place] [address] Road at [phone] or if after hours contact your primary care physician or go to the hospital emergency department.     Signed By: Bella Cade RN     December 20, 2018

## 2018-12-20 NOTE — WOUND CARE
12/20/18 0944   Wound Leg Upper Lateral;Left   Date First Assessed/Time First Assessed: 12/20/18 0943   POA: Yes  Wound Type: Abcess  Location: Leg Upper  Orientation: Lateral;Left   DRESSING TYPE (wound vac)   Wound Length (cm) 12.5 cm   Wound Width (cm) 2.3 cm   Wound Depth (cm) 2.5   Wound Surface area (cm^2) 28.75 cm^2   Condition of Base Hypergranulation;Granulation   Condition of Edges Closed   Tissue Type Percent Red 100   Drainage Amount  Moderate   Drainage Color Serous   Wound Odor None   Periwound Skin Condition Intact   Cleansing and Cleansing Agents  Soap and water

## 2018-12-20 NOTE — PROGRESS NOTES
WOUND CENTER H&P/Consult Note/Progress Note:   Mario Durbin  MRN: [id number]  :1975  Age:43 y.o.    HPI: Mario Durbin is a 44 y.o. male who was referred to the wound center for his first visit on 2018. He was recently hospitalized with a right thigh abscess and underwent surgery on 2018 by Dr. Ashlee Rodarte.  He originally presented to the emergency room with right-sided swelling after a fall a week prior. If there was documented history of drug abuse and a CT scan was obtained which identified a large complex abscess extending in the muscle and around the hip and femur. He complains of a lot of pain in the thigh particularly around having his wound VAC changed. Intraoperatively he had a large abscess extending around the right hip and extending behind the femur on the left side. Had fascial necrosis. DATE OF SERVICE: 2018  SURGEON:  Vikas Camacho MD  POSTOPERATIVE DIAGNOSIS:  Right thigh abscess with extensive necrotizing fasciitis and intramuscular abscesses extending around the right hip and proximal femur. PROCEDURE PERFORMED:  Excisional debridement of necrotizing fasciitis of right thigh with drainage of extensive intramuscular abscesses.       This information was obtained from the patient  The following HPI elements were documented for the patient's wound:  Location: right thigh  Duration: since 18  Severity:  severe necrosis  Context:  prior emergent surgery; h/o drug abuse  Modifying Factors:  Nothing makes better or worse  Quality:    Timing:     Associated Signs and Symptoms: No associated fevers recently        Past Medical History:   Diagnosis Date    GERD (gastroesophageal reflux disease)     Necrotizing fasciitis ([facility] [address] 2018    Other insomnia 2018     Past Surgical History:   Procedure Laterality Date    HX OTHER SURGICAL      hernia      Current Outpatient Medications   Medication Sig    amoxicillin-clavulanate (AUGMENTIN) 875-125 mg per tablet Take 1 Tab by mouth every twelve (12) hours for 15 days.  ibuprofen (MOTRIN) 200 mg tablet Take 1 Tab by mouth every eight (8) hours as needed for Pain.  HYDROcodone-acetaminophen (NORCO)  mg tablet Take 1 Tab by mouth every four (4) hours as needed. Max Daily Amount: 6 Tabs. No current facility-administered medications for this encounter. ALLERGIES: Patient has no known allergies. Social History     Socioeconomic History    Marital status:      Spouse name: Not on file    Number of children: Not on file    Years of education: Not on file    Highest education level: Not on file   Tobacco Use    Smoking status: Current Every Day Smoker     Packs/day: 0.50    Smokeless tobacco: Never Used   Substance and Sexual Activity    Alcohol use: No     Comment: seldom    Drug use: Yes     Types: Heroin, Cocaine     Comment: sniffing;  denies IV use     Social History     Tobacco Use   Smoking Status Current Every Day Smoker    Packs/day: 0.50   Smokeless Tobacco Never Used     History reviewed. No pertinent family history. ROS: The patient has no difficulty with chest pain or shortness of breath. No fever or chills. Comprehensive review of systems was otherwise unremarkable except as noted above. Physical Exam:   Visit Vitals  BP (!) 141/91 (BP 1 Location: Left arm)   Pulse 92   Temp 98.5 °F (36.9 °C)   Resp 20   Ht 6' (1.829 m)   Wt 159 lb 9.6 oz (72.4 kg)   SpO2 96%   BMI 21.65 kg/m²     Constitutional: Alert, oriented, cooperative patient in no acute distress; appears stated age;  General appearance is within normal limits for wound care patient population. See the today's recorded vitals signs and constitutional data. Eyes: Pupils equal; Sclera are clear. EOMs intact; The eyes appear to track and move normally. The sclera are not injected. The conjunctive are clear. The eyelids are normal. There is no scleral icterus.    ENMT: There are no obvious external ear, nose, lip or mouth lesions. Nares normal; Neck: Overall contour of the neck is normal with no obvious neck masses. Gross hearing is within normal limits. No obvious neck masses  CV: RRR;  no JVD; No evidence of cyanosis of the upper extremities. The extremities are perfused without embolic sign, splinter hemorrhages, or petechia. Resp:  Breathing is non-labored; normal rate and effort; no audible wheezing. GI: soft and non-distended without acute abnormality noted. Musculoskeletal: unremarkable with normal function. No embolic signs or cyanosis. Neuro:  Oriented; moves all 4; no focal deficits  Psychiatric: Judgement and insight are within normal limits for the wound care population of patients. Patient is oriented to person, place, and time. Recent and remote memory are within normal limits. Mood and affect are within normal limits. Integumentary (Skin/Wounds)  See inspection of wound(s) below. There are no other skin areas of palpable concern. See wound center documentation including photos in the [address] Wound Template made part of this record by reference.      Wounds:  Wound Hip Right (Active)   Number of days: 25       Wound Leg Upper Lateral;Left (Active)   Wound Length (cm) 12.5 cm 12/20/2018  9:44 AM   Wound Width (cm) 2.3 cm 12/20/2018  9:44 AM   Wound Depth (cm) 2.5 12/20/2018  9:44 AM   Wound Surface area (cm^2) 28.75 cm^2 12/20/2018  9:44 AM   Condition of Base Hypergranulation;Granulation 12/20/2018  9:44 AM   Condition of Edges Closed 12/20/2018  9:44 AM   Tissue Type Percent Red 100 12/20/2018  9:44 AM   Drainage Amount  Moderate 12/20/2018  9:44 AM   Drainage Color Serous 12/20/2018  9:44 AM   Wound Odor None 12/20/2018  9:44 AM   Periwound Skin Condition Intact 12/20/2018  9:44 AM   Cleansing and Cleansing Agents  Soap and water 12/20/2018  9:44 AM   Number of days: 0                Recent vitals (if inpt):  Patient Vitals for the past 24 hrs:   BP Temp Pulse Resp SpO2 Height Weight 12/20/18 0927 (!) 141/91 98.5 °F (36.9 °C) 92 20 96 % 6' (1.829 m) 159 lb 9.6 oz (72.4 kg)       Labs:  No results for input(s): WBC, HGB, PLT, NA, K, CL, CO2, BUN, CREA, GLU, PTP, INR, APTT, TBIL, TBILI, CBIL, SGOT, GPT, ALT, AP, AML, LPSE, LCAD, NH4, TROPT, TROIQ, PCO2, PO2, HCO3, HGBEXT, PLTEXT in the last 72 hours. No lab exists for component:  PH, INREXT    Lab Results   Component Value Date/Time    WBC 9.1 11/29/2018 04:46 AM    HGB 8.6 (L) 12/04/2018 03:14 AM    PLATELET 876 (H) 64/83/3003 04:46 AM    Sodium 140 12/02/2018 04:19 AM    Potassium 3.7 12/04/2018 03:14 AM    Chloride 103 12/02/2018 04:19 AM    CO2 28 12/02/2018 04:19 AM    BUN 10 12/02/2018 04:19 AM    Creatinine 0.81 12/02/2018 04:19 AM    Glucose 91 12/02/2018 04:19 AM    Bilirubin, total 0.3 11/27/2018 05:54 AM    AST (SGOT) 30 11/27/2018 05:54 AM    ALT (SGPT) 16 11/27/2018 05:54 AM    Alk. phosphatase 43 (L) 11/27/2018 05:54 AM       I reviewed recent labs and recent radiologic studies. I independently reviewed radiology images for studies I described above or studies I have ordered. Admission date (for inpatients): 12/20/2018   * No surgery found *  * No surgery found *      ASSESSMENT/PLAN:  Problem List  Never Reviewed          Codes Class Noted    Polysubstance abuse ([facility] [address] (Chronic) ICD-10-CM: F19.10  ICD-9-CM: 305.90  11/28/2018        Other insomnia (Chronic) ICD-10-CM: G47.09  ICD-9-CM: 780.52  11/27/2018        Necrotizing fasciitis ([facility] [address] ICD-10-CM: M72.6  ICD-9-CM: 728.86  11/26/2018            Active Problems:    * No active hospital problems. *       Any procedures done today in the wound center are documented in a separate note in connect care made part of this record by reference  I have independently seen the patient and obtained history and independently examined the patient. I have also reviewed data/labs and developed the above plan of care.   Old records reviewed as above          Wound Care  No orders of the defined types were placed in this encounter. Open wound right thigh  No loculations of sign necrosis  Wound vac 3 times a week  Follow-up in 2 weeks  Debride prn    Pain control  Recommended Pepcid 20 mg every 12 hours and Tylenol up to 1000 mg up to 4 times a day. Also recommended ibuprofen as needed 200 mg but encouraged him to not smoking to take his Pepcid regularly to lower the risk of gastritis or peptic ulcer disease. I recommended he take no more than 400 mg of ibuprofen at a time. No narcotics. Substance Abuse  Encourage cessation/avoidance  Rehab if he is willing        Follow-up Information    None       I have independently seen the patient and obtained history and independently examined the patient. I have also reviewed data/labs and developed the above plan of care.        Signed:  Raquel Arvizu MD,  FACS

## 2018-12-21 ENCOUNTER — HOME CARE VISIT (OUTPATIENT)
Dept: SCHEDULING | Facility: HOME HEALTH | Age: 43
End: 2018-12-21
Payer: SELF-PAY

## 2018-12-21 PROCEDURE — G0299 HHS/HOSPICE OF RN EA 15 MIN: HCPCS

## 2018-12-23 VITALS
OXYGEN SATURATION: 98 % | SYSTOLIC BLOOD PRESSURE: 128 MMHG | HEART RATE: 84 BPM | TEMPERATURE: 97.9 F | DIASTOLIC BLOOD PRESSURE: 76 MMHG | RESPIRATION RATE: 18 BRPM

## 2018-12-24 ENCOUNTER — HOME CARE VISIT (OUTPATIENT)
Dept: SCHEDULING | Facility: HOME HEALTH | Age: 43
End: 2018-12-24
Payer: SELF-PAY

## 2018-12-24 PROCEDURE — G0299 HHS/HOSPICE OF RN EA 15 MIN: HCPCS

## 2018-12-26 ENCOUNTER — HOME CARE VISIT (OUTPATIENT)
Dept: SCHEDULING | Facility: HOME HEALTH | Age: 43
End: 2018-12-26
Payer: SELF-PAY

## 2018-12-26 VITALS
HEART RATE: 88 BPM | SYSTOLIC BLOOD PRESSURE: 122 MMHG | OXYGEN SATURATION: 98 % | TEMPERATURE: 98.2 F | DIASTOLIC BLOOD PRESSURE: 64 MMHG | RESPIRATION RATE: 18 BRPM

## 2018-12-26 PROCEDURE — G0299 HHS/HOSPICE OF RN EA 15 MIN: HCPCS

## 2018-12-27 VITALS
SYSTOLIC BLOOD PRESSURE: 134 MMHG | DIASTOLIC BLOOD PRESSURE: 74 MMHG | RESPIRATION RATE: 18 BRPM | HEART RATE: 88 BPM | OXYGEN SATURATION: 98 % | TEMPERATURE: 98.1 F

## 2018-12-28 ENCOUNTER — HOME CARE VISIT (OUTPATIENT)
Dept: SCHEDULING | Facility: HOME HEALTH | Age: 43
End: 2018-12-28
Payer: SELF-PAY

## 2018-12-28 VITALS
HEART RATE: 87 BPM | SYSTOLIC BLOOD PRESSURE: 128 MMHG | RESPIRATION RATE: 18 BRPM | DIASTOLIC BLOOD PRESSURE: 72 MMHG | TEMPERATURE: 97.8 F | OXYGEN SATURATION: 99 %

## 2018-12-28 PROCEDURE — G0299 HHS/HOSPICE OF RN EA 15 MIN: HCPCS

## 2018-12-31 ENCOUNTER — HOME CARE VISIT (OUTPATIENT)
Dept: SCHEDULING | Facility: HOME HEALTH | Age: 43
End: 2018-12-31
Payer: SELF-PAY

## 2018-12-31 VITALS
OXYGEN SATURATION: 98 % | SYSTOLIC BLOOD PRESSURE: 142 MMHG | RESPIRATION RATE: 18 BRPM | TEMPERATURE: 97.9 F | HEART RATE: 86 BPM | DIASTOLIC BLOOD PRESSURE: 72 MMHG

## 2018-12-31 PROCEDURE — G0299 HHS/HOSPICE OF RN EA 15 MIN: HCPCS

## 2019-01-02 ENCOUNTER — HOME CARE VISIT (OUTPATIENT)
Dept: SCHEDULING | Facility: HOME HEALTH | Age: 44
End: 2019-01-02
Payer: SELF-PAY

## 2019-01-02 VITALS
SYSTOLIC BLOOD PRESSURE: 138 MMHG | OXYGEN SATURATION: 99 % | DIASTOLIC BLOOD PRESSURE: 76 MMHG | TEMPERATURE: 98.3 F | HEART RATE: 79 BPM | RESPIRATION RATE: 18 BRPM

## 2019-01-02 PROCEDURE — G0299 HHS/HOSPICE OF RN EA 15 MIN: HCPCS

## 2019-01-04 ENCOUNTER — HOME CARE VISIT (OUTPATIENT)
Dept: SCHEDULING | Facility: HOME HEALTH | Age: 44
End: 2019-01-04
Payer: SELF-PAY

## 2019-01-04 VITALS
RESPIRATION RATE: 18 BRPM | TEMPERATURE: 98.1 F | DIASTOLIC BLOOD PRESSURE: 76 MMHG | SYSTOLIC BLOOD PRESSURE: 134 MMHG | OXYGEN SATURATION: 98 % | HEART RATE: 89 BPM

## 2019-01-04 PROCEDURE — G0299 HHS/HOSPICE OF RN EA 15 MIN: HCPCS

## 2019-01-07 ENCOUNTER — HOME CARE VISIT (OUTPATIENT)
Dept: SCHEDULING | Facility: HOME HEALTH | Age: 44
End: 2019-01-07
Payer: SELF-PAY

## 2019-01-07 PROCEDURE — G0299 HHS/HOSPICE OF RN EA 15 MIN: HCPCS

## 2019-01-08 VITALS
SYSTOLIC BLOOD PRESSURE: 134 MMHG | RESPIRATION RATE: 18 BRPM | TEMPERATURE: 98.1 F | OXYGEN SATURATION: 98 % | HEART RATE: 87 BPM | DIASTOLIC BLOOD PRESSURE: 66 MMHG

## 2019-01-09 ENCOUNTER — HOME CARE VISIT (OUTPATIENT)
Dept: SCHEDULING | Facility: HOME HEALTH | Age: 44
End: 2019-01-09
Payer: SELF-PAY

## 2019-01-10 ENCOUNTER — HOME CARE VISIT (OUTPATIENT)
Dept: HOME HEALTH SERVICES | Facility: HOME HEALTH | Age: 44
End: 2019-01-10
Payer: SELF-PAY

## 2019-01-11 ENCOUNTER — HOME CARE VISIT (OUTPATIENT)
Dept: SCHEDULING | Facility: HOME HEALTH | Age: 44
End: 2019-01-11
Payer: SELF-PAY

## 2020-03-12 ENCOUNTER — HOSPITAL ENCOUNTER (EMERGENCY)
Age: 45
Discharge: HOME | End: 2020-03-12
Attending: EMERGENCY MEDICINE
Payer: COMMERCIAL

## 2020-03-12 ENCOUNTER — APPOINTMENT (OUTPATIENT)
Dept: GENERAL RADIOLOGY | Age: 45
End: 2020-03-12
Attending: PHYSICIAN ASSISTANT
Payer: COMMERCIAL

## 2020-03-12 VITALS
HEIGHT: 73 IN | DIASTOLIC BLOOD PRESSURE: 106 MMHG | RESPIRATION RATE: 16 BRPM | TEMPERATURE: 98.2 F | HEART RATE: 90 BPM | WEIGHT: 160 LBS | BODY MASS INDEX: 21.2 KG/M2 | OXYGEN SATURATION: 95 % | SYSTOLIC BLOOD PRESSURE: 151 MMHG

## 2020-03-12 DIAGNOSIS — J20.9 ACUTE BRONCHITIS, UNSPECIFIED ORGANISM: Primary | ICD-10-CM

## 2020-03-12 LAB
FLUAV AG NPH QL IA: NEGATIVE
FLUBV AG NPH QL IA: NEGATIVE
SPECIMEN SOURCE: NORMAL

## 2020-03-12 PROCEDURE — 71046 X-RAY EXAM CHEST 2 VIEWS: CPT

## 2020-03-12 PROCEDURE — 99284 EMERGENCY DEPT VISIT MOD MDM: CPT

## 2020-03-12 PROCEDURE — 87804 INFLUENZA ASSAY W/OPTIC: CPT

## 2020-03-12 RX ORDER — CEPHALEXIN 500 MG/1
500 CAPSULE ORAL 4 TIMES DAILY
Qty: 28 CAP | Refills: 0 | Status: SHIPPED | OUTPATIENT
Start: 2020-03-12 | End: 2020-03-19

## 2020-03-12 RX ORDER — PREDNISONE 50 MG/1
50 TABLET ORAL DAILY
Qty: 3 TAB | Refills: 0 | Status: SHIPPED | OUTPATIENT
Start: 2020-03-12 | End: 2020-03-15

## 2020-03-12 NOTE — DISCHARGE INSTRUCTIONS
Use meds as directed along with over the counter cough and col meds, return to er if symptoms worsen

## 2020-03-12 NOTE — ED TRIAGE NOTES
Pt states productive cough that's yellow in color. States weakness, shob, and no energy. States symptoms started x2 days ago. Denies recent known exposure.

## 2020-03-12 NOTE — LETTER
[address] [facility] EMERGENCY DEPT 
[address] [facility] [address] [facility] 
[phone] Work/School Note Date: 3/12/2020 To Whom It May concern: 
 
Kati Menendez was seen and treated today in the emergency room by the following provider(s): 
Attending Provider: Marcus Romero MD 
Physician Assistant: NELLY Momin. Kati Menendez may return to work on 3-15-20. Sincerely, NELLY Patrick

## 2020-03-12 NOTE — ED NOTES
I have reviewed discharge instructions with the patient. The patient verbalized understanding. Patient left ED via Discharge Method: ambulatory to Home with spouse    Opportunity for questions and clarification provided. Patient given 2 scripts. To continue your aftercare when you leave the hospital, you may receive an automated call from our care team to check in on how you are doing. This is a free service and part of our promise to provide the best care and service to meet your aftercare needs.  If you have questions, or wish to unsubscribe from this service please call [phone]. Thank you for Choosing our [facility] Emergency Department.

## 2020-03-12 NOTE — ED PROVIDER NOTES
With cough congestion for the past 2 days no chest pain nausea vomiting no over-the-counter meds taken for symptoms    The history is provided by the patient. Cough   This is a new problem. The current episode started 2 days ago. The problem occurs constantly. The problem has been gradually worsening. The cough is productive of sputum. Patient reports a subjective fever - was not measured. The fever has been present for 1 - 2 days. Associated symptoms include myalgias. Pertinent negatives include no sore throat, no shortness of breath, no wheezing and no vomiting. He has tried nothing for the symptoms. The treatment provided no relief. He is a smoker. His past medical history does not include pneumonia or asthma. Past Medical History:   Diagnosis Date    GERD (gastroesophageal reflux disease)     Necrotizing fasciitis ([facility] [address] 11/26/2018    Other insomnia 11/27/2018       Past Surgical History:   Procedure Laterality Date    HX OTHER SURGICAL      hernia          No family history on file.     Social History     Socioeconomic History    Marital status:      Spouse name: Not on file    Number of children: Not on file    Years of education: Not on file    Highest education level: Not on file   Occupational History    Not on file   Social Needs    Financial resource strain: Not on file    Food insecurity     Worry: Not on file     Inability: Not on file    Transportation needs     Medical: Not on file     Non-medical: Not on file   Tobacco Use    Smoking status: Current Every Day Smoker     Packs/day: 0.50    Smokeless tobacco: Never Used   Substance and Sexual Activity    Alcohol use: No     Comment: seldom    Drug use: Yes     Types: Heroin, Cocaine     Comment: sniffing;  denies IV use    Sexual activity: Not on file   Lifestyle    Physical activity     Days per week: Not on file     Minutes per session: Not on file    Stress: Not on file   Relationships    Social connections     Talks on phone: Not on file     Gets together: Not on file     Attends Voodoo service: Not on file     Active member of club or organization: Not on file     Attends meetings of clubs or organizations: Not on file     Relationship status: Not on file    Intimate partner violence     Fear of current or ex partner: Not on file     Emotionally abused: Not on file     Physically abused: Not on file     Forced sexual activity: Not on file   Other Topics Concern    Not on file   Social History Narrative    Not on file         ALLERGIES: Patient has no known allergies. Review of Systems   HENT: Negative for sore throat. Respiratory: Positive for cough. Negative for shortness of breath and wheezing. Gastrointestinal: Negative for vomiting. Musculoskeletal: Positive for myalgias. All other systems reviewed and are negative. Vitals:    03/12/20 1327   BP: (!) 153/91   Pulse: (!) 109   Resp: 14   Temp: 99.1 °F (37.3 °C)   SpO2: 95%   Weight: 72.6 kg (160 lb)   Height: 6' 1\" (1.854 m)            Physical Exam  Vitals signs and nursing note reviewed. Constitutional:       General: He is not in acute distress. Appearance: Normal appearance. He is well-developed and normal weight. He is not diaphoretic. HENT:      Head: Normocephalic and atraumatic. Right Ear: Tympanic membrane normal.      Left Ear: Tympanic membrane normal.      Nose: Congestion present. Mouth/Throat:      Mouth: Mucous membranes are moist.   Eyes:      Pupils: Pupils are equal, round, and reactive to light. Neck:      Musculoskeletal: Normal range of motion and neck supple. Cardiovascular:      Rate and Rhythm: Normal rate and regular rhythm. Pulmonary:      Effort: Pulmonary effort is normal.      Breath sounds: Normal breath sounds. No wheezing or rhonchi. Comments: Congested cough   Abdominal:      General: Abdomen is flat. Bowel sounds are normal.      Palpations: Abdomen is soft. Tenderness:  There is no guarding or rebound. Musculoskeletal: Normal range of motion. Skin:     General: Skin is warm. Neurological:      Mental Status: He is alert and oriented to person, place, and time.           MDM  Number of Diagnoses or Management Options  Diagnosis management comments: Flu -, chest -  Will treat bronchitis with keflex and prednisone, work note given       Amount and/or Complexity of Data Reviewed  Clinical lab tests: ordered and reviewed  Tests in the radiology section of CPT®: ordered and reviewed  Review and summarize past medical records: yes    Risk of Complications, Morbidity, and/or Mortality  Presenting problems: low  Diagnostic procedures: low  Management options: low    Patient Progress  Patient progress: improved         Procedures

## 2020-11-01 ENCOUNTER — HOSPITAL ENCOUNTER (EMERGENCY)
Age: 45
Discharge: HOME | End: 2020-11-01
Attending: EMERGENCY MEDICINE

## 2020-11-01 VITALS
SYSTOLIC BLOOD PRESSURE: 147 MMHG | OXYGEN SATURATION: 100 % | RESPIRATION RATE: 16 BRPM | DIASTOLIC BLOOD PRESSURE: 96 MMHG | BODY MASS INDEX: 21.47 KG/M2 | TEMPERATURE: 97.9 F | HEART RATE: 89 BPM | WEIGHT: 162 LBS | HEIGHT: 73 IN

## 2020-11-01 DIAGNOSIS — Z20.822 PERSON UNDER INVESTIGATION FOR COVID-19: Primary | ICD-10-CM

## 2020-11-01 PROCEDURE — 99283 EMERGENCY DEPT VISIT LOW MDM: CPT

## 2020-11-01 PROCEDURE — 87635 SARS-COV-2 COVID-19 AMP PRB: CPT

## 2020-11-01 NOTE — ED NOTES
I have reviewed discharge instructions with the patient. The patient verbalized understanding. Patient left ED via Discharge Method: ambulatory to Home with (self). Opportunity for questions and clarification provided. Patient given 0 scripts. No e-sign. To continue your aftercare when you leave the hospital, you may receive an automated call from our care team to check in on how you are doing. This is a free service and part of our promise to provide the best care and service to meet your aftercare needs.  If you have questions, or wish to unsubscribe from this service please call [phone]. Thank you for Choosing our [address] [place] [address] Emergency Department.

## 2020-11-01 NOTE — ED PROVIDER NOTES
20-year-old male presents with complaint of body aches since yesterday. Patient states mild nonproductive cough. Patient denies chest pain, headache, sore throat, rhinorrhea, nasal congestion, loss of sense of taste or smell, nausea, vomiting, diarrhea, abdominal pain, urinary symptoms. Unlike triage documentation patient denies shortness of breath. Patient also states that he took ibuprofen at home before he took his temperature. States that his temperature was not elevated. Triage documentation is accurate in regards to this. Patient states that he is uncertain if he has had any known exposure to COVID-19. Patient states that he is here and Elena Aayush wants a COVID test\". The history is provided by the patient. No  was used. Past Medical History:   Diagnosis Date    GERD (gastroesophageal reflux disease)     Necrotizing [address]iitis ([facility] [address] 11/26/2018    Other insomnia 11/27/2018       Past Surgical History:   Procedure Laterality Date    HX OTHER SURGICAL      hernia          No family history on file.     Social History     Socioeconomic History    Marital status:      Spouse name: Not on file    Number of children: Not on file    Years of education: Not on file    Highest education level: Not on file   Occupational History    Not on file   Social Needs    Financial resource strain: Not on file    Food insecurity     Worry: Not on file     Inability: Not on file    Transportation needs     Medical: Not on file     Non-medical: Not on file   Tobacco Use    Smoking status: Current Every Day Smoker     Packs/day: 0.50    Smokeless tobacco: Never Used   Substance and Sexual Activity    Alcohol use: No     Comment: seldom    Drug use: Yes     Types: Heroin, Cocaine     Comment: sniffing;  denies IV use    Sexual activity: Not on file   Lifestyle    Physical activity     Days per week: Not on file     Minutes per session: Not on file    Stress: Not on file Relationships    Social connections     Talks on phone: Not on file     Gets together: Not on file     Attends Bahai service: Not on file     Active member of club or organization: Not on file     Attends meetings of clubs or organizations: Not on file     Relationship status: Not on file    Intimate partner violence     Fear of current or ex partner: Not on file     Emotionally abused: Not on file     Physically abused: Not on file     Forced sexual activity: Not on file   Other Topics Concern    Not on file   Social History Narrative    Not on file         ALLERGIES: Patient has no known allergies. Review of Systems   Constitutional: Positive for chills. Negative for fatigue and fever. HENT: Negative for congestion, rhinorrhea, sore throat, trouble swallowing and voice change. Respiratory: Positive for cough. Negative for shortness of breath and wheezing. Cardiovascular: Negative for chest pain. Gastrointestinal: Negative for abdominal pain, diarrhea, nausea and vomiting. Genitourinary: Negative for dysuria and flank pain. Musculoskeletal: Positive for myalgias. Negative for neck pain and neck stiffness. Skin: Negative for rash and wound. Neurological: Negative for dizziness, weakness, light-headedness, numbness and headaches. Vitals:    11/01/20 1721   BP: (!) 147/96   Pulse: 89   Resp: 16   Temp: 97.9 °F (36.6 °C)   SpO2: 100%   Weight: 73.5 kg (162 lb)   Height: 6' 1\" (1.854 m)            Physical Exam  Vitals signs and nursing note reviewed. Constitutional:       Appearance: He is well-developed. Comments: Well-appearing. Nontoxic in appearance. HENT:      Head: Normocephalic. Mouth/Throat:      Mouth: Mucous membranes are moist.   Eyes:      Extraocular Movements: Extraocular movements intact. Pupils: Pupils are equal, round, and reactive to light. Neck:      Comments: FROM. No nuchal rigidity. Cardiovascular:      Rate and Rhythm: Normal rate. Pulses: Normal pulses. Heart sounds: Normal heart sounds. Pulmonary:      Effort: Pulmonary effort is normal.      Breath sounds: Normal breath sounds. Comments: CTAB. No wheezes, rhonchi, or rales. Abdominal:      Palpations: Abdomen is soft. Tenderness: There is no abdominal tenderness. Comments: Soft, nontender, nondistended. No rebound or guarding. Musculoskeletal: Normal range of motion. Skin:     Findings: No rash. Neurological:      General: No focal deficit present. Mental Status: He is alert and oriented to person, place, and time. Motor: No weakness. Comments: No meningeal signs. MDM  Number of Diagnoses or Management Options  Person under investigation for COVID-19: minor  Diagnosis management comments: Vital signs stable. Patient well-appearing. Patient declines chest x-ray or any additional blood work at this time. Patient informed of risks. Patient with capacity to make own medical decisions. Will swab for COVID-19. Patient ambulated in place with no significant desaturation. Pulse ox 100% on RA. Patient instructed on need for close follow-up with primary care physician and to return to emergency department symptoms worsen or progress in any way. Amount and/or Complexity of Data Reviewed  Tests in the medicine section of CPT®: ordered and reviewed  Review and summarize past medical records: yes    Risk of Complications, Morbidity, and/or Mortality  Presenting problems: low  Diagnostic procedures: low  Management options: low    Patient Progress  Patient progress: stable         Procedures                   Joe Fisher MD; 11/1/2020 @5:57 PM Voice dictation software was used during the making of this note. This software is not perfect and grammatical and other typographical errors may be present.   This note has not been proofread for errors.  ===================================================================

## 2020-11-01 NOTE — ED TRIAGE NOTES
Pt ambulatory to triage without complications. Pt states he has SOB and fever x 1 day with bodyaches. Pt states temp was 103 and took ibuprofen and the fever went away. Pt denies cough, n/v/d, has not had flu shot this season and denies known exposure to COVID. Pt denies hx of heart problems. Pt states he just wants a COVID test, he doesn't want blood work.

## 2020-11-01 NOTE — LETTER
[address] [facility] EMERGENCY DEPT 
[address] [facility] [address] 
[phone] Work/School Note Date: 11/1/2020 To Whom It May concern: 
 
Enedina Perez was evaulated by the following provider(s): 
Attending Provider: Tae Saucedo MD.   Leliaa Mcclellan virus is suspected. Per the CDC guidelines we recommend home isolation until the following conditions are all met: 1. At least 10 days have passed since symptoms first appeared and 2. At least 24 hours have passed since last fever without the use of fever-reducing medications and 
3. Symptoms (e.g., cough, shortness of breath) have improved Sincerely, 
 
 
 
 
Marco Ibarra MD

## 2020-11-02 ENCOUNTER — PATIENT OUTREACH (OUTPATIENT)
Dept: CASE MANAGEMENT | Age: 45
End: 2020-11-02

## 2020-11-02 LAB
SARS COV-2, XPGCVT: NEGATIVE
SOURCE, COVRS: NORMAL

## 2020-11-02 NOTE — PROGRESS NOTES
Patient contacted regarding recent visit for viral symptoms. Outreach made within 2 business days of discharge: Yes    This author contacted the patient by telephone to perform post discharge call. Verified name and  with patient as identifiers. Provided introduction to self, and reason for call due to viral symptoms of infection and/or exposure to COVID-19. Discussed COVID-19 related testing which was pending at this time. Test results were pending. Patient informed of results, if available? Results pending    Advance Care Planning:   Does patient have an Advance Directive: currently not on file; education provided     Patient presented to emergency department/flu clinic with complaints of viral symptoms/exposure to COVID. Patient reports symptoms are improving. Due to no new or worsening symptoms the RN CTN/ACM was not notified for escalation. Discussed exposure protocols and quarantine with CDC Guidelines What To Do If You Are Sick    Patient was given an opportunity for questions and concerns. Stay home except to get medical care  Separate yourself from other people and animals in your home  Call ahead before visiting your doctor  Wear a facemask  Cover your coughs and sneezes  Clean your hands often  Avoid sharing personal household items  Clean all high-touch surfaces everyday    Monitor your symptoms  Seek prompt medical attention if your illness is worsening (e.g., difficulty breathing). Before seeking care, call your healthcare provider and tell them that you have, or are being evaluated for, COVID-19. Put on a facemask before you enter the facility. These steps will help the healthcare provider's office to keep other people in the office or waiting room from getting infected or exposed. Ask your healthcare provider to call the local or state health department.  Persons who are placed under If you have a medical emergency and need to call 911, notify the dispatch personnel that you have, or are being evaluated for COVID-19. If possible, put on a facemask before emergency medical services arrive. The patient agrees to contact the [facility] exposure line [phone], local health department [facility] [address]: ([phone]) and PCP office for questions related to their healthcare. Author provided contact information for future reference. Patient/family/caregiver given information for [facility] and agrees to enroll no  Patient preferred e-mail:   Patient preferred phone contact:   Based on Loop alert triggers, patient will be contacted by nurse care manager for worsening symptoms.

## 2020-12-30 NOTE — ED TRIAGE NOTES
Pt arrived from home via POV with c/o fever x 4-5 days. Pt has not been taking temperature, but has felt hot, sweaty, chills, and just poorly. Pt states, \"About four to five days ago, just got body aches, hot and cold, just felt bad. \"  Pt denies current chest pain, fore throat, N/V, or diarrhea. Pt admits to having burning during urination for the last two days. - - -

## 2022-03-19 PROBLEM — F19.10 POLYSUBSTANCE ABUSE (HCC): Status: ACTIVE | Noted: 2018-11-28

## 2022-03-19 PROBLEM — S71.101A OPEN WOUND OF RIGHT THIGH: Status: ACTIVE | Noted: 2018-12-20

## 2022-03-19 PROBLEM — G47.09 OTHER INSOMNIA: Status: ACTIVE | Noted: 2018-11-27

## 2022-03-19 PROBLEM — M72.6 NECROTIZING FASCIITIS (HCC): Status: ACTIVE | Noted: 2018-11-26

## 2023-01-03 ENCOUNTER — HOSPITAL ENCOUNTER (EMERGENCY)
Age: 48
Discharge: LEFT BEFORE DISCHARGE | End: 2023-01-03

## 2024-12-05 ENCOUNTER — HOSPITAL ENCOUNTER (EMERGENCY)
Age: 49
Discharge: HOME | End: 2024-12-05
Attending: EMERGENCY MEDICINE

## 2024-12-05 VITALS
RESPIRATION RATE: 20 BRPM | OXYGEN SATURATION: 100 % | TEMPERATURE: 99.7 F | SYSTOLIC BLOOD PRESSURE: 147 MMHG | HEART RATE: 93 BPM | DIASTOLIC BLOOD PRESSURE: 93 MMHG

## 2024-12-05 DIAGNOSIS — F19.10 POLYSUBSTANCE ABUSE (HCC): Primary | ICD-10-CM

## 2024-12-05 DIAGNOSIS — R68.89 SYMPTOM OF DRUG WITHDRAWAL: ICD-10-CM

## 2024-12-05 LAB
ALBUMIN SERPL-MCNC: 3.7 G/DL (ref 3.5–5)
ALBUMIN/GLOB SERPL: 0.8 (ref 1–1.9)
ALP SERPL-CCNC: 76 U/L (ref 40–129)
ALT SERPL-CCNC: 13 U/L (ref 8–55)
AMPHET UR QL SCN: POSITIVE
ANION GAP SERPL CALC-SCNC: 15 MMOL/L (ref 7–16)
AST SERPL-CCNC: 27 U/L (ref 15–37)
BARBITURATES UR QL SCN: NEGATIVE
BASOPHILS # BLD: 0 K/UL (ref 0–0.2)
BASOPHILS NFR BLD: 0 % (ref 0–2)
BENZODIAZ UR QL: NEGATIVE
BILIRUB SERPL-MCNC: 0.6 MG/DL (ref 0–1.2)
BUN SERPL-MCNC: 20 MG/DL (ref 6–23)
CALCIUM SERPL-MCNC: 10.3 MG/DL (ref 8.8–10.2)
CANNABINOIDS UR QL SCN: POSITIVE
CHLORIDE SERPL-SCNC: 100 MMOL/L (ref 98–107)
CO2 SERPL-SCNC: 26 MMOL/L (ref 20–29)
COCAINE UR QL SCN: POSITIVE
CREAT SERPL-MCNC: 1 MG/DL (ref 0.8–1.3)
DIFFERENTIAL METHOD BLD: ABNORMAL
EKG ATRIAL RATE: 91 BPM
EKG DIAGNOSIS: NORMAL
EKG P AXIS: 45 DEGREES
EKG P-R INTERVAL: 136 MS
EKG Q-T INTERVAL: 358 MS
EKG QRS DURATION: 98 MS
EKG QTC CALCULATION (BAZETT): 441 MS
EKG R AXIS: 65 DEGREES
EKG T AXIS: 24 DEGREES
EKG VENTRICULAR RATE: 91 BPM
EOSINOPHIL # BLD: 0 K/UL (ref 0–0.8)
EOSINOPHIL NFR BLD: 0 % (ref 0.5–7.8)
ERYTHROCYTE [DISTWIDTH] IN BLOOD BY AUTOMATED COUNT: 12.5 % (ref 11.9–14.6)
GLOBULIN SER CALC-MCNC: 4.7 G/DL (ref 2.3–3.5)
GLUCOSE SERPL-MCNC: 114 MG/DL (ref 70–99)
HCT VFR BLD AUTO: 37.8 % (ref 41.1–50.3)
HGB BLD-MCNC: 12.3 G/DL (ref 13.6–17.2)
IMM GRANULOCYTES # BLD AUTO: 0.1 K/UL (ref 0–0.5)
IMM GRANULOCYTES NFR BLD AUTO: 1 % (ref 0–5)
LYMPHOCYTES # BLD: 0.7 K/UL (ref 0.5–4.6)
LYMPHOCYTES NFR BLD: 7 % (ref 13–44)
MAGNESIUM SERPL-MCNC: 2.3 MG/DL (ref 1.8–2.4)
MCH RBC QN AUTO: 27.8 PG (ref 26.1–32.9)
MCHC RBC AUTO-ENTMCNC: 32.5 G/DL (ref 31.4–35)
MCV RBC AUTO: 85.3 FL (ref 82–102)
METHADONE UR QL: NEGATIVE
MONOCYTES # BLD: 0.5 K/UL (ref 0.1–1.3)
MONOCYTES NFR BLD: 5 % (ref 4–12)
NEUTS SEG # BLD: 8.3 K/UL (ref 1.7–8.2)
NEUTS SEG NFR BLD: 87 % (ref 43–78)
NRBC # BLD: 0 K/UL (ref 0–0.2)
OPIATES UR QL: NEGATIVE
PCP UR QL: NEGATIVE
PLATELET # BLD AUTO: 341 K/UL (ref 150–450)
PMV BLD AUTO: 10.6 FL (ref 9.4–12.3)
POTASSIUM SERPL-SCNC: 3.8 MMOL/L (ref 3.5–5.1)
PROT SERPL-MCNC: 8.5 G/DL (ref 6.3–8.2)
RBC # BLD AUTO: 4.43 M/UL (ref 4.23–5.6)
SODIUM SERPL-SCNC: 140 MMOL/L (ref 136–145)
WBC # BLD AUTO: 9.5 K/UL (ref 4.3–11.1)

## 2024-12-05 PROCEDURE — 96374 THER/PROPH/DIAG INJ IV PUSH: CPT

## 2024-12-05 PROCEDURE — 85025 COMPLETE CBC W/AUTO DIFF WBC: CPT

## 2024-12-05 PROCEDURE — 80053 COMPREHEN METABOLIC PANEL: CPT

## 2024-12-05 PROCEDURE — 80307 DRUG TEST PRSMV CHEM ANLYZR: CPT

## 2024-12-05 PROCEDURE — 6360000002 HC RX W HCPCS: Performed by: EMERGENCY MEDICINE

## 2024-12-05 PROCEDURE — 6370000000 HC RX 637 (ALT 250 FOR IP): Performed by: EMERGENCY MEDICINE

## 2024-12-05 PROCEDURE — 99284 EMERGENCY DEPT VISIT MOD MDM: CPT

## 2024-12-05 PROCEDURE — 93005 ELECTROCARDIOGRAM TRACING: CPT | Performed by: EMERGENCY MEDICINE

## 2024-12-05 PROCEDURE — 83735 ASSAY OF MAGNESIUM: CPT

## 2024-12-05 PROCEDURE — 2580000003 HC RX 258: Performed by: EMERGENCY MEDICINE

## 2024-12-05 PROCEDURE — 93010 ELECTROCARDIOGRAM REPORT: CPT | Performed by: INTERNAL MEDICINE

## 2024-12-05 PROCEDURE — 2500000003 HC RX 250 WO HCPCS: Performed by: EMERGENCY MEDICINE

## 2024-12-05 PROCEDURE — 96375 TX/PRO/DX INJ NEW DRUG ADDON: CPT

## 2024-12-05 PROCEDURE — 96361 HYDRATE IV INFUSION ADD-ON: CPT

## 2024-12-05 RX ORDER — ONDANSETRON 2 MG/ML
4 INJECTION INTRAMUSCULAR; INTRAVENOUS ONCE
Status: COMPLETED | OUTPATIENT
Start: 2024-12-05 | End: 2024-12-05

## 2024-12-05 RX ORDER — 0.9 % SODIUM CHLORIDE 0.9 %
1000 INTRAVENOUS SOLUTION INTRAVENOUS ONCE
Status: COMPLETED | OUTPATIENT
Start: 2024-12-05 | End: 2024-12-05

## 2024-12-05 RX ADMIN — ONDANSETRON 4 MG: 2 INJECTION INTRAMUSCULAR; INTRAVENOUS at 11:03

## 2024-12-05 RX ADMIN — FAMOTIDINE 20 MG: 10 INJECTION, SOLUTION INTRAVENOUS at 11:01

## 2024-12-05 RX ADMIN — HYOSCYAMINE SULFATE 0.12 MG: 0.12 TABLET ORAL; SUBLINGUAL at 11:04

## 2024-12-05 RX ADMIN — SODIUM CHLORIDE 1000 ML: 9 INJECTION, SOLUTION INTRAVENOUS at 11:35

## 2024-12-05 ASSESSMENT — PAIN SCALES - GENERAL: PAINLEVEL_OUTOF10: 10

## 2024-12-05 ASSESSMENT — PAIN DESCRIPTION - LOCATION: LOCATION: ABDOMEN

## 2024-12-05 ASSESSMENT — LIFESTYLE VARIABLES
HOW MANY STANDARD DRINKS CONTAINING ALCOHOL DO YOU HAVE ON A TYPICAL DAY: PATIENT DOES NOT DRINK
HOW OFTEN DO YOU HAVE A DRINK CONTAINING ALCOHOL: NEVER

## 2024-12-05 ASSESSMENT — PAIN DESCRIPTION - DESCRIPTORS: DESCRIPTORS: ACHING

## 2024-12-05 ASSESSMENT — PAIN - FUNCTIONAL ASSESSMENT: PAIN_FUNCTIONAL_ASSESSMENT: 0-10

## 2024-12-05 NOTE — ED PROVIDER NOTES
Emergency Department Provider Note                   PCP:                None, None               Age: 49 y.o.      Sex: male       ICD-10-CM    1. Polysubstance abuse (HCC)  F19.10       2. Symptom of drug withdrawal  R68.89           DISPOSITION Decision To Discharge 12/05/2024 01:42:18 PM   DISPOSITION CONDITION Stable             MDM  Number of Diagnoses or Management Options  Polysubstance abuse (HCC)  Diagnosis management comments: MEDICAL DECISION MAKING  Complexity of Problems Addressed:  1 or more chronic illnesses with a severe exacerbation or progression.    Data Reviewed and Analyzed:  Category 1:   I independently ordered and reviewed each unique test.    Category 2:   I independently ordered and interpreted the ED EKG in the absence of a Cardiologist.    Rate: 91  EKG Interpretation: EKG Interpretation: sinus rhythm, no evidence of arrhythmia  ST Segments: Normal ST segments - NO STEMI    Category 3: Discussion of management or test interpretation.  The patient was sent from drug rehab for GI symptoms associated with opiate withdrawal. IV Zofran, IV Pepcid, and SL Levsin ordered. The patient was given IV fluids for hydration.  Blood work is unremarkable.  The patient's drug screen is positive for multiple substances.  The patient was observed in appeared stable.  Will discharge the patient back to the [facility].    Risk of Complications and/or Morbidity of Patient Management:  Diagnosis or care significantly limited by social determinants of health illicit drug abuse.                Orders Placed This Encounter   Procedures    CBC with Auto Differential    CMP    Magnesium    Urine Drug Screen    EKG 12 Lead Repeat        Medications   ondansetron (ZOFRAN) injection 4 mg (4 mg IntraVENous Given 12/5/24 1103)   famotidine (PEPCID) 20 mg in sodium chloride (PF) 0.9 % 10 mL injection (20 mg IntraVENous Given 12/5/24 1101)   hyoscyamine (LEVSIN/SL) sublingual tablet 0.125 mg (0.125 mg SubLINGual

## 2024-12-05 NOTE — ED TRIAGE NOTES
Pt arrives via EMS from the [facility] withdrawing from fentanyl.  Pt uses 1-2 g daily.  Pt took fentanyl 2 days ago.  Vitals stable.

## 2024-12-05 NOTE — CASE COMMUNICATION
Spoke with [facility] detox who is updated on patient who will be discharging shortly.  He may return [address] in [place] - they prefer he return to them in a cab/uber vs. Calling family to transport.

## 2025-06-20 ENCOUNTER — APPOINTMENT (OUTPATIENT)
Dept: GENERAL RADIOLOGY | Age: 50
DRG: 190 | End: 2025-06-20

## 2025-06-20 ENCOUNTER — APPOINTMENT (OUTPATIENT)
Dept: CT IMAGING | Age: 50
DRG: 190 | End: 2025-06-20

## 2025-06-20 ENCOUNTER — HOSPITAL ENCOUNTER (INPATIENT)
Age: 50
LOS: 4 days | Discharge: HOME | DRG: 190 | End: 2025-06-24
Attending: EMERGENCY MEDICINE | Admitting: FAMILY MEDICINE

## 2025-06-20 DIAGNOSIS — J20.9 BRONCHITIS WITH BRONCHOSPASM: ICD-10-CM

## 2025-06-20 DIAGNOSIS — R09.02 HYPOXEMIA: ICD-10-CM

## 2025-06-20 DIAGNOSIS — J44.1 COPD EXACERBATION (HCC): Primary | ICD-10-CM

## 2025-06-20 PROBLEM — J43.9 BULLOUS EMPHYSEMA (HCC): Status: ACTIVE | Noted: 2025-06-20

## 2025-06-20 PROBLEM — J96.01 ACUTE HYPOXIC RESPIRATORY FAILURE (HCC): Status: ACTIVE | Noted: 2025-06-20

## 2025-06-20 LAB
ALBUMIN SERPL-MCNC: 3.9 G/DL (ref 3.5–5)
ALBUMIN/GLOB SERPL: 1 (ref 1–1.9)
ALP SERPL-CCNC: 76 U/L (ref 40–129)
ALT SERPL-CCNC: 11 U/L (ref 8–55)
ANION GAP SERPL CALC-SCNC: 16 MMOL/L (ref 7–16)
APPEARANCE UR: CLEAR
AST SERPL-CCNC: 18 U/L (ref 15–37)
BACTERIA URNS QL MICRO: NEGATIVE /HPF
BASOPHILS # BLD: 0.02 K/UL (ref 0–0.2)
BASOPHILS NFR BLD: 0.2 % (ref 0–2)
BILIRUB SERPL-MCNC: 1.1 MG/DL (ref 0–1.2)
BILIRUB UR QL: NEGATIVE
BUN SERPL-MCNC: 23 MG/DL (ref 6–23)
CALCIUM SERPL-MCNC: 10 MG/DL (ref 8.8–10.2)
CHLORIDE SERPL-SCNC: 101 MMOL/L (ref 98–107)
CO2 SERPL-SCNC: 22 MMOL/L (ref 20–29)
COLOR UR: ABNORMAL
CREAT SERPL-MCNC: 0.99 MG/DL (ref 0.8–1.3)
DIFFERENTIAL METHOD BLD: ABNORMAL
EKG ATRIAL RATE: 89 BPM
EKG DIAGNOSIS: NORMAL
EKG P AXIS: 71 DEGREES
EKG P-R INTERVAL: 132 MS
EKG Q-T INTERVAL: 345 MS
EKG QRS DURATION: 91 MS
EKG QTC CALCULATION (BAZETT): 420 MS
EKG R AXIS: 64 DEGREES
EKG T AXIS: 5 DEGREES
EKG VENTRICULAR RATE: 89 BPM
EOSINOPHIL # BLD: 0 K/UL (ref 0–0.8)
EOSINOPHIL NFR BLD: 0 % (ref 0.5–7.8)
EPI CELLS #/AREA URNS HPF: ABNORMAL /HPF
ERYTHROCYTE [DISTWIDTH] IN BLOOD BY AUTOMATED COUNT: 12.6 % (ref 11.9–14.6)
GLOBULIN SER CALC-MCNC: 3.8 G/DL (ref 2.3–3.5)
GLUCOSE SERPL-MCNC: 119 MG/DL (ref 70–99)
GLUCOSE UR STRIP.AUTO-MCNC: NEGATIVE MG/DL
HCT VFR BLD AUTO: 39.8 % (ref 41.1–50.3)
HGB BLD-MCNC: 13 G/DL (ref 13.6–17.2)
HGB UR QL STRIP: ABNORMAL
HYALINE CASTS URNS QL MICRO: ABNORMAL /LPF
IMM GRANULOCYTES # BLD AUTO: 0.02 K/UL (ref 0–0.5)
IMM GRANULOCYTES NFR BLD AUTO: 0.2 % (ref 0–5)
KETONES UR QL STRIP.AUTO: 40 MG/DL
LACTATE SERPL-SCNC: 1.1 MMOL/L (ref 0.5–2)
LEUKOCYTE ESTERASE UR QL STRIP.AUTO: NEGATIVE
LYMPHOCYTES # BLD: 0.77 K/UL (ref 0.5–4.6)
LYMPHOCYTES NFR BLD: 8.5 % (ref 13–44)
MCH RBC QN AUTO: 27.7 PG (ref 26.1–32.9)
MCHC RBC AUTO-ENTMCNC: 32.7 G/DL (ref 31.4–35)
MCV RBC AUTO: 84.7 FL (ref 82–102)
MONOCYTES # BLD: 0.68 K/UL (ref 0.1–1.3)
MONOCYTES NFR BLD: 7.5 % (ref 4–12)
NEUTS SEG # BLD: 7.54 K/UL (ref 1.7–8.2)
NEUTS SEG NFR BLD: 83.6 % (ref 43–78)
NITRITE UR QL STRIP.AUTO: NEGATIVE
NRBC # BLD: 0 K/UL (ref 0–0.2)
PH UR STRIP: 6.5 (ref 5–9)
PLATELET # BLD AUTO: 275 K/UL (ref 150–450)
PMV BLD AUTO: 10.8 FL (ref 9.4–12.3)
POTASSIUM SERPL-SCNC: 3.8 MMOL/L (ref 3.5–5.1)
PROCALCITONIN SERPL-MCNC: 0.02 NG/ML (ref 0–0.1)
PROT SERPL-MCNC: 7.7 G/DL (ref 6.3–8.2)
PROT UR STRIP-MCNC: 30 MG/DL
RBC # BLD AUTO: 4.7 M/UL (ref 4.23–5.6)
RBC #/AREA URNS HPF: ABNORMAL /HPF
SODIUM SERPL-SCNC: 140 MMOL/L (ref 136–145)
SP GR UR REFRACTOMETRY: >1.035 (ref 1–1.02)
UROBILINOGEN UR QL STRIP.AUTO: 1 EU/DL (ref 0.2–1)
WBC # BLD AUTO: 9 K/UL (ref 4.3–11.1)
WBC URNS QL MICRO: ABNORMAL /HPF

## 2025-06-20 PROCEDURE — 2500000003 HC RX 250 WO HCPCS: Performed by: EMERGENCY MEDICINE

## 2025-06-20 PROCEDURE — 6360000004 HC RX CONTRAST MEDICATION: Performed by: EMERGENCY MEDICINE

## 2025-06-20 PROCEDURE — 94664 DEMO&/EVAL PT USE INHALER: CPT

## 2025-06-20 PROCEDURE — 94644 CONT INHLJ TX 1ST HOUR: CPT

## 2025-06-20 PROCEDURE — 81001 URINALYSIS AUTO W/SCOPE: CPT

## 2025-06-20 PROCEDURE — 96366 THER/PROPH/DIAG IV INF ADDON: CPT

## 2025-06-20 PROCEDURE — 6370000000 HC RX 637 (ALT 250 FOR IP): Performed by: EMERGENCY MEDICINE

## 2025-06-20 PROCEDURE — 6360000002 HC RX W HCPCS: Performed by: FAMILY MEDICINE

## 2025-06-20 PROCEDURE — 2500000003 HC RX 250 WO HCPCS

## 2025-06-20 PROCEDURE — 94760 N-INVAS EAR/PLS OXIMETRY 1: CPT

## 2025-06-20 PROCEDURE — 93010 ELECTROCARDIOGRAM REPORT: CPT | Performed by: INTERNAL MEDICINE

## 2025-06-20 PROCEDURE — 71045 X-RAY EXAM CHEST 1 VIEW: CPT

## 2025-06-20 PROCEDURE — 76937 US GUIDE VASCULAR ACCESS: CPT

## 2025-06-20 PROCEDURE — 99285 EMERGENCY DEPT VISIT HI MDM: CPT

## 2025-06-20 PROCEDURE — 6370000000 HC RX 637 (ALT 250 FOR IP): Performed by: FAMILY MEDICINE

## 2025-06-20 PROCEDURE — 84145 PROCALCITONIN (PCT): CPT

## 2025-06-20 PROCEDURE — 94761 N-INVAS EAR/PLS OXIMETRY MLT: CPT

## 2025-06-20 PROCEDURE — 80053 COMPREHEN METABOLIC PANEL: CPT

## 2025-06-20 PROCEDURE — 2500000003 HC RX 250 WO HCPCS: Performed by: FAMILY MEDICINE

## 2025-06-20 PROCEDURE — 36415 COLL VENOUS BLD VENIPUNCTURE: CPT

## 2025-06-20 PROCEDURE — 2700000000 HC OXYGEN THERAPY PER DAY

## 2025-06-20 PROCEDURE — 71260 CT THORAX DX C+: CPT

## 2025-06-20 PROCEDURE — 2580000003 HC RX 258: Performed by: FAMILY MEDICINE

## 2025-06-20 PROCEDURE — 2580000003 HC RX 258: Performed by: EMERGENCY MEDICINE

## 2025-06-20 PROCEDURE — 94667 MNPJ CHEST WALL 1ST: CPT

## 2025-06-20 PROCEDURE — 87040 BLOOD CULTURE FOR BACTERIA: CPT

## 2025-06-20 PROCEDURE — 83605 ASSAY OF LACTIC ACID: CPT

## 2025-06-20 PROCEDURE — 6360000002 HC RX W HCPCS: Performed by: EMERGENCY MEDICINE

## 2025-06-20 PROCEDURE — 74177 CT ABD & PELVIS W/CONTRAST: CPT

## 2025-06-20 PROCEDURE — 96375 TX/PRO/DX INJ NEW DRUG ADDON: CPT

## 2025-06-20 PROCEDURE — 93005 ELECTROCARDIOGRAM TRACING: CPT | Performed by: EMERGENCY MEDICINE

## 2025-06-20 PROCEDURE — 2060000000 HC ICU INTERMEDIATE R&B

## 2025-06-20 PROCEDURE — 85025 COMPLETE CBC W/AUTO DIFF WBC: CPT

## 2025-06-20 PROCEDURE — 96365 THER/PROPH/DIAG IV INF INIT: CPT

## 2025-06-20 PROCEDURE — 94640 AIRWAY INHALATION TREATMENT: CPT

## 2025-06-20 RX ORDER — DEXAMETHASONE SODIUM PHOSPHATE 10 MG/ML
10 INJECTION, SOLUTION INTRA-ARTICULAR; INTRALESIONAL; INTRAMUSCULAR; INTRAVENOUS; SOFT TISSUE
Status: COMPLETED | OUTPATIENT
Start: 2025-06-20 | End: 2025-06-20

## 2025-06-20 RX ORDER — IPRATROPIUM BROMIDE AND ALBUTEROL SULFATE 2.5; .5 MG/3ML; MG/3ML
1 SOLUTION RESPIRATORY (INHALATION) EVERY 6 HOURS
Status: DISCONTINUED | OUTPATIENT
Start: 2025-06-20 | End: 2025-06-20

## 2025-06-20 RX ORDER — POLYETHYLENE GLYCOL 3350 17 G/17G
17 POWDER, FOR SOLUTION ORAL DAILY PRN
Status: DISCONTINUED | OUTPATIENT
Start: 2025-06-20 | End: 2025-06-24 | Stop reason: HOSPADM

## 2025-06-20 RX ORDER — SODIUM CHLORIDE FOR INHALATION 3 %
4 VIAL, NEBULIZER (ML) INHALATION 2 TIMES DAILY
Status: DISCONTINUED | OUTPATIENT
Start: 2025-06-20 | End: 2025-06-24 | Stop reason: HOSPADM

## 2025-06-20 RX ORDER — SODIUM CHLORIDE 9 MG/ML
INJECTION, SOLUTION INTRAVENOUS PRN
Status: DISCONTINUED | OUTPATIENT
Start: 2025-06-20 | End: 2025-06-24 | Stop reason: HOSPADM

## 2025-06-20 RX ORDER — DIAZEPAM 10 MG/2ML
2.5 INJECTION, SOLUTION INTRAMUSCULAR; INTRAVENOUS EVERY 6 HOURS PRN
Status: DISCONTINUED | OUTPATIENT
Start: 2025-06-20 | End: 2025-06-24 | Stop reason: HOSPADM

## 2025-06-20 RX ORDER — SODIUM CHLORIDE 9 MG/ML
INJECTION, SOLUTION INTRAVENOUS CONTINUOUS
Status: DISCONTINUED | OUTPATIENT
Start: 2025-06-20 | End: 2025-06-20

## 2025-06-20 RX ORDER — IPRATROPIUM BROMIDE AND ALBUTEROL SULFATE 2.5; .5 MG/3ML; MG/3ML
1 SOLUTION RESPIRATORY (INHALATION)
Status: COMPLETED | OUTPATIENT
Start: 2025-06-20 | End: 2025-06-20

## 2025-06-20 RX ORDER — ENOXAPARIN SODIUM 100 MG/ML
40 INJECTION SUBCUTANEOUS DAILY
Status: DISCONTINUED | OUTPATIENT
Start: 2025-06-20 | End: 2025-06-24 | Stop reason: HOSPADM

## 2025-06-20 RX ORDER — SODIUM CHLORIDE 9 MG/ML
INJECTION, SOLUTION INTRAVENOUS CONTINUOUS
Status: ACTIVE | OUTPATIENT
Start: 2025-06-20 | End: 2025-06-21

## 2025-06-20 RX ORDER — CLONIDINE HYDROCHLORIDE 0.1 MG/1
0.1 TABLET ORAL EVERY 6 HOURS PRN
Status: DISCONTINUED | OUTPATIENT
Start: 2025-06-20 | End: 2025-06-20 | Stop reason: SDUPTHER

## 2025-06-20 RX ORDER — GUAIFENESIN 600 MG/1
1200 TABLET, EXTENDED RELEASE ORAL 2 TIMES DAILY
Status: DISCONTINUED | OUTPATIENT
Start: 2025-06-20 | End: 2025-06-20

## 2025-06-20 RX ORDER — IOPAMIDOL 755 MG/ML
100 INJECTION, SOLUTION INTRAVASCULAR
Status: COMPLETED | OUTPATIENT
Start: 2025-06-20 | End: 2025-06-20

## 2025-06-20 RX ORDER — LOPERAMIDE HYDROCHLORIDE 2 MG/1
2 CAPSULE ORAL 4 TIMES DAILY PRN
Status: DISCONTINUED | OUTPATIENT
Start: 2025-06-20 | End: 2025-06-24 | Stop reason: HOSPADM

## 2025-06-20 RX ORDER — DOXYCYCLINE 100 MG/1
100 CAPSULE ORAL EVERY 12 HOURS SCHEDULED
Status: DISCONTINUED | OUTPATIENT
Start: 2025-06-21 | End: 2025-06-24 | Stop reason: HOSPADM

## 2025-06-20 RX ORDER — IBUPROFEN 400 MG/1
400 TABLET, FILM COATED ORAL EVERY 4 HOURS PRN
Status: DISCONTINUED | OUTPATIENT
Start: 2025-06-20 | End: 2025-06-24 | Stop reason: HOSPADM

## 2025-06-20 RX ORDER — GUAIFENESIN 200 MG/10ML
400 LIQUID ORAL EVERY 4 HOURS PRN
Status: DISCONTINUED | OUTPATIENT
Start: 2025-06-20 | End: 2025-06-24 | Stop reason: HOSPADM

## 2025-06-20 RX ORDER — SODIUM CHLORIDE 0.9 % (FLUSH) 0.9 %
5-40 SYRINGE (ML) INJECTION EVERY 12 HOURS SCHEDULED
Status: DISCONTINUED | OUTPATIENT
Start: 2025-06-20 | End: 2025-06-24 | Stop reason: HOSPADM

## 2025-06-20 RX ORDER — IPRATROPIUM BROMIDE AND ALBUTEROL SULFATE 2.5; .5 MG/3ML; MG/3ML
1 SOLUTION RESPIRATORY (INHALATION)
Status: DISCONTINUED | OUTPATIENT
Start: 2025-06-20 | End: 2025-06-24 | Stop reason: HOSPADM

## 2025-06-20 RX ORDER — GABAPENTIN 300 MG/1
300 CAPSULE ORAL EVERY 8 HOURS PRN
Status: DISCONTINUED | OUTPATIENT
Start: 2025-06-20 | End: 2025-06-24 | Stop reason: HOSPADM

## 2025-06-20 RX ORDER — HYDROXYZINE PAMOATE 25 MG/1
50 CAPSULE ORAL EVERY 8 HOURS PRN
Status: DISCONTINUED | OUTPATIENT
Start: 2025-06-20 | End: 2025-06-24 | Stop reason: HOSPADM

## 2025-06-20 RX ORDER — ACETAMINOPHEN 325 MG/1
650 TABLET ORAL EVERY 6 HOURS PRN
Status: DISCONTINUED | OUTPATIENT
Start: 2025-06-20 | End: 2025-06-24 | Stop reason: HOSPADM

## 2025-06-20 RX ORDER — CYCLOBENZAPRINE HCL 10 MG
10 TABLET ORAL 3 TIMES DAILY PRN
Status: DISCONTINUED | OUTPATIENT
Start: 2025-06-20 | End: 2025-06-24 | Stop reason: HOSPADM

## 2025-06-20 RX ORDER — PANTOPRAZOLE SODIUM 40 MG/1
40 TABLET, DELAYED RELEASE ORAL
Status: DISCONTINUED | OUTPATIENT
Start: 2025-06-20 | End: 2025-06-24 | Stop reason: HOSPADM

## 2025-06-20 RX ORDER — HYDRALAZINE HYDROCHLORIDE 20 MG/ML
5 INJECTION INTRAMUSCULAR; INTRAVENOUS EVERY 6 HOURS PRN
Status: DISCONTINUED | OUTPATIENT
Start: 2025-06-20 | End: 2025-06-24 | Stop reason: HOSPADM

## 2025-06-20 RX ORDER — NALOXONE HYDROCHLORIDE 0.4 MG/ML
0.4 INJECTION, SOLUTION INTRAMUSCULAR; INTRAVENOUS; SUBCUTANEOUS PRN
Status: DISCONTINUED | OUTPATIENT
Start: 2025-06-20 | End: 2025-06-24 | Stop reason: HOSPADM

## 2025-06-20 RX ORDER — BUPRENORPHINE 2 MG/1
4 TABLET SUBLINGUAL PRN
Status: DISCONTINUED | OUTPATIENT
Start: 2025-06-20 | End: 2025-06-23

## 2025-06-20 RX ORDER — ONDANSETRON 4 MG/1
4 TABLET, ORALLY DISINTEGRATING ORAL EVERY 8 HOURS PRN
Status: DISCONTINUED | OUTPATIENT
Start: 2025-06-20 | End: 2025-06-24 | Stop reason: HOSPADM

## 2025-06-20 RX ORDER — BUPRENORPHINE HYDROCHLORIDE AND NALOXONE HYDROCHLORIDE DIHYDRATE 8; 2 MG/1; MG/1
1 TABLET SUBLINGUAL
Status: COMPLETED | OUTPATIENT
Start: 2025-06-20 | End: 2025-06-20

## 2025-06-20 RX ORDER — SODIUM CHLORIDE 0.9 % (FLUSH) 0.9 %
5-40 SYRINGE (ML) INJECTION PRN
Status: DISCONTINUED | OUTPATIENT
Start: 2025-06-20 | End: 2025-06-24 | Stop reason: HOSPADM

## 2025-06-20 RX ORDER — ALBUTEROL SULFATE 0.83 MG/ML
10 SOLUTION RESPIRATORY (INHALATION) CONTINUOUS
Status: DISPENSED | OUTPATIENT
Start: 2025-06-20 | End: 2025-06-20

## 2025-06-20 RX ORDER — ACETAMINOPHEN 650 MG/1
650 SUPPOSITORY RECTAL EVERY 6 HOURS PRN
Status: DISCONTINUED | OUTPATIENT
Start: 2025-06-20 | End: 2025-06-24 | Stop reason: HOSPADM

## 2025-06-20 RX ORDER — HYOSCYAMINE SULFATE 0.12 MG/5ML
0.12 LIQUID ORAL EVERY 4 HOURS PRN
Status: DISCONTINUED | OUTPATIENT
Start: 2025-06-20 | End: 2025-06-24 | Stop reason: HOSPADM

## 2025-06-20 RX ORDER — ONDANSETRON 2 MG/ML
4 INJECTION INTRAMUSCULAR; INTRAVENOUS EVERY 6 HOURS PRN
Status: DISCONTINUED | OUTPATIENT
Start: 2025-06-20 | End: 2025-06-24 | Stop reason: HOSPADM

## 2025-06-20 RX ADMIN — GABAPENTIN 300 MG: 300 CAPSULE ORAL at 17:23

## 2025-06-20 RX ADMIN — SODIUM CHLORIDE, PRESERVATIVE FREE 10 ML: 5 INJECTION INTRAVENOUS at 20:23

## 2025-06-20 RX ADMIN — GUAIFENESIN 400 MG: 200 SOLUTION ORAL at 21:20

## 2025-06-20 RX ADMIN — ALBUTEROL SULFATE 10 MG: 2.5 SOLUTION RESPIRATORY (INHALATION) at 13:02

## 2025-06-20 RX ADMIN — DEXAMETHASONE SODIUM PHOSPHATE 10 MG: 10 INJECTION INTRAMUSCULAR; INTRAVENOUS at 12:04

## 2025-06-20 RX ADMIN — IPRATROPIUM BROMIDE AND ALBUTEROL SULFATE 1 DOSE: 2.5; .5 SOLUTION RESPIRATORY (INHALATION) at 21:28

## 2025-06-20 RX ADMIN — GUAIFENESIN 1200 MG: 600 TABLET ORAL at 17:24

## 2025-06-20 RX ADMIN — WATER 1000 MG: 1 INJECTION INTRAMUSCULAR; INTRAVENOUS; SUBCUTANEOUS at 12:03

## 2025-06-20 RX ADMIN — AZITHROMYCIN MONOHYDRATE 500 MG: 500 INJECTION, POWDER, LYOPHILIZED, FOR SOLUTION INTRAVENOUS at 12:14

## 2025-06-20 RX ADMIN — SODIUM CHLORIDE: 0.9 INJECTION, SOLUTION INTRAVENOUS at 17:19

## 2025-06-20 RX ADMIN — Medication 4 ML: at 21:28

## 2025-06-20 RX ADMIN — ENOXAPARIN SODIUM 40 MG: 100 INJECTION SUBCUTANEOUS at 16:08

## 2025-06-20 RX ADMIN — IOPAMIDOL 100 ML: 755 INJECTION, SOLUTION INTRAVENOUS at 12:20

## 2025-06-20 RX ADMIN — SODIUM CHLORIDE: 0.9 INJECTION, SOLUTION INTRAVENOUS at 16:07

## 2025-06-20 RX ADMIN — IPRATROPIUM BROMIDE AND ALBUTEROL SULFATE 1 DOSE: 2.5; .5 SOLUTION RESPIRATORY (INHALATION) at 11:57

## 2025-06-20 RX ADMIN — BUPRENORPHINE HCL 4 MG: 2 TABLET SUBLINGUAL at 20:34

## 2025-06-20 RX ADMIN — PANTOPRAZOLE SODIUM 40 MG: 40 TABLET, DELAYED RELEASE ORAL at 17:24

## 2025-06-20 RX ADMIN — HYDROXYZINE PAMOATE 50 MG: 25 CAPSULE ORAL at 21:18

## 2025-06-20 RX ADMIN — BUPRENORPHINE HYDROCHLORIDE AND NALOXONE HYDROCHLORIDE DIHYDRATE 1 TABLET: 8; 2 TABLET SUBLINGUAL at 14:52

## 2025-06-20 ASSESSMENT — PAIN SCALES - GENERAL: PAINLEVEL_OUTOF10: 0

## 2025-06-20 ASSESSMENT — PAIN - FUNCTIONAL ASSESSMENT: PAIN_FUNCTIONAL_ASSESSMENT: 0-10

## 2025-06-20 ASSESSMENT — LIFESTYLE VARIABLES
HOW OFTEN DO YOU HAVE A DRINK CONTAINING ALCOHOL: NEVER
HOW MANY STANDARD DRINKS CONTAINING ALCOHOL DO YOU HAVE ON A TYPICAL DAY: PATIENT DOES NOT DRINK

## 2025-06-20 NOTE — ED TRIAGE NOTES
Arrives via [facility] from [facility] for detox from Fentanyl. States has been there for 9 days.    C/O dark malodorous urine, N/V, upper abd pain, chest pain and SHOB.          RA 88%- does not wear oxygen at baseline. Placed onto 2L NC at 94%

## 2025-06-20 NOTE — PROGRESS NOTES
TRANSFER - IN REPORT:    Verbal report received from GILLES Cantrell on Mike Garces  being received from ED for routine progression of patient care      Report consisted of patient's Situation, Background, Assessment and   Recommendations(SBAR).     Information from the following report(s) ED Encounter Summary and ED SBAR was reviewed with the receiving nurse.    Opportunity for questions and clarification was provided.      Report given to GILLES Rodas who will assume care of patient upon arrival to unit

## 2025-06-20 NOTE — CONSULTS
Ultrasound was used to find the vein which was compressible and without any ultrasound features of an artery or nerve bundle. Skin was cleaned and disinfected prior to IV puncture.  Under real-time ultrasound guidance peripheral access was obtained in the left forearm using 20 G 1.75\" Peripheral IV catheter after 1 attempt(s). No immediate complications noted. Patient tolerated the procedure well.  Refer to IV flowsheet for further documentation.

## 2025-06-20 NOTE — ED NOTES
TRANSFER - OUT REPORT:    Verbal report given to RN on Mike Garces  being transferred to [facility] for routine progression of patient care       Report consisted of patient's Situation, Background, Assessment and   Recommendations(SBAR).     Information from the following report(s) Nurse Handoff Report, ED Encounter Summary, ED SBAR, Adult Overview, Intake/Output, MAR, and Recent Results was reviewed with the receiving nurse.    [place] Fall Assessment:    Presents to emergency department  because of falls (Syncope, seizure, or loss of consciousness): No  Age > 70: No  Altered Mental Status, Intoxication with alcohol or substance confusion (Disorientation, impaired judgment, poor safety awaremess, or inability to follow instructions): No  Impaired Mobility: Ambulates or transfers with assistive devices or assistance; Unable to ambulate or transer.: No  Nursing Judgement: No          Lines:   Peripheral IV 06/20/25 Left;Anterior Forearm (Active)   Site Assessment Clean, dry & intact 06/20/25 1308   Line Status Blood return noted;Flushed;Normal saline locked 06/20/25 1308   Line Care Connections checked and tightened 06/20/25 1308   Phlebitis Assessment No symptoms 06/20/25 1308   Infiltration Assessment 0 06/20/25 1308   Alcohol Cap Used Yes 06/20/25 1308   Dressing Status Clean, dry & intact;New dressing applied 06/20/25 1308   Dressing Type Transparent 06/20/25 1308   Dressing Intervention New 06/20/25 1308        Opportunity for questions and clarification was provided.      Patient transported with:  O2 @ 3lpm and Tamia Becerra RN  06/20/25 [id number]

## 2025-06-20 NOTE — PROGRESS NOTES
Hospitalist History and Physical   Admit Date:  2025  9:56 AM   Name:  Mike Garces   Age:  49 y.o.  Sex:  male  :  1975   MRN:  [id number]   Room:  [facility]    Presenting/Chief Complaint: Abdominal Pain     Reason(s) for Admission: Hypoxemia [R09.02]  Bronchitis with bronchospasm [J20.9]  COPD exacerbation (HCC) [J44.1]  Acute hypoxic respiratory failure (HCC) [J96.01]     History of Present Illness:   Mike Garces is a 49 y.o. male with medical history of fentanyl and cocaine abuse, patient has been in [facility] for detox from fentanyl.    Triage  Arrives via [facility] from [facility] for detox from Fentanyl. States has been there for 9 days.   C/O dark malodorous urine, N/V, upper abd pain, chest pain and SHOB.        RA 88%- does not wear oxygen at baseline. Placed onto 2L NC at 94%    Patient says he was admitted on Tuesday, since Wednesday started having nausea mild vomiting mild cough, intermittent diarrhea, started having mild shortness of breath since yesterday, brought to emergency room for further evaluation.    Patient otherwise did not complain of any headache or dizziness or fever or chills or chest pain.    Respirate max of 23, oxygen saturation 88% on room air, presently on oxygen 2 L/min.  Urine drug screen positive for amphetamines, cocaine and THC  CT chest PE protocol-  IMPRESSION:  1.  No pulmonary embolus identified.   2.  Extensive mucous/debris throughout the lower lobe airways bilaterally.   3.  Mild atelectasis or scarring in the left lung base.   4.  Mild to moderate paraseptal emphysema with bullous changes.  CT abdomen pelvis with contrast shows  No acute findings, cholelithiasis    Patient will be admitted for acute hypoxic respiratory failure, opioid withdrawal.    Assessment & Plan:     Principal Problem:    Acute hypoxic respiratory failure (HCC)  CT findings of bullous emphysema, mild atelectasis, extensive mucus/debris's throughout the lower lobe  U/L    Total Protein 7.7 6.3 - 8.2 g/dL    Albumin 3.9 3.5 - 5.0 g/dL    Globulin 3.8 (H) 2.3 - 3.5 g/dL    Albumin/Globulin Ratio 1.0 1.0 - 1.9     CBC with Auto Differential    Collection Time: 06/20/25 11:17 AM   Result Value Ref Range    WBC 9.0 4.3 - 11.1 K/uL    RBC 4.70 4.23 - 5.6 M/uL    Hemoglobin 13.0 (L) 13.6 - 17.2 g/dL    Hematocrit 39.8 (L) 41.1 - 50.3 %    MCV 84.7 82 - 102 FL    MCH 27.7 26.1 - 32.9 PG    MCHC 32.7 31.4 - 35.0 g/dL    RDW 12.6 11.9 - 14.6 %    Platelets 275 150 - 450 K/uL    MPV 10.8 9.4 - 12.3 FL    nRBC 0.00 0.0 - 0.2 K/uL    Differential Type AUTOMATED      Neutrophils % 83.6 (H) 43.0 - 78.0 %    Lymphocytes % 8.5 (L) 13.0 - 44.0 %    Monocytes % 7.5 4.0 - 12.0 %    Eosinophils % 0.0 (L) 0.5 - 7.8 %    Basophils % 0.2 0.0 - 2.0 %    Immature Granulocytes % 0.2 0.0 - 5.0 %    Neutrophils Absolute 7.54 1.70 - 8.20 K/UL    Lymphocytes Absolute 0.77 0.50 - 4.60 K/UL    Monocytes Absolute 0.68 0.10 - 1.30 K/UL    Eosinophils Absolute 0.00 0.00 - 0.80 K/UL    Basophils Absolute 0.02 0.00 - 0.20 K/UL    Immature Granulocytes Absolute 0.02 0.0 - 0.5 K/UL   Lactate, Sepsis    Collection Time: 06/20/25 11:17 AM   Result Value Ref Range    Lactic Acid, Sepsis 1.1 0.5 - 2.0 MMOL/L   Procalcitonin    Collection Time: 06/20/25 11:17 AM   Result Value Ref Range    Procalcitonin 0.02 0.00 - 0.10 ng/mL       No results for input(s): \"COVID19\" in the last 72 hours.    CT CHEST PULMONARY EMBOLISM W CONTRAST  Result Date: 6/20/2025  EXAMINATION: CT CHEST PULMONARY EMBOLISM W CONTRAST EXAM DATE: 6/20/2025 12:27 PM INDICATION: Shortness of breath, hypoxia, normal chest x-ray-PE? COMPARISON: 6/20/2025 TECHNIQUE: Axial CT images were obtained of the chest with intravenous contrast in the pulmonary arterial phase. Multiplanar and MIP reconstructions were performed. CHEST FINDINGS: Lungs/Pleura: Mild to moderate paraseptal emphysema is present with bullous changes. There is a small amount of atelectasis or

## 2025-06-20 NOTE — PROGRESS NOTES
4 Eyes Skin Assessment     NAME:  Mike Garces  YOB: 1975  MEDICAL RECORD NUMBER:  [id number]    The patient is being assessed for  Admission    I agree that at least one RN has performed a thorough Head to Toe Skin Assessment on the patient. ALL assessment sites listed below have been assessed.      Areas assessed by both nurses:    Head, Face, Ears, Shoulders, Back, Chest, Arms, Elbows, Hands, Sacrum. Buttock, Coccyx, Ischium, Legs. Feet and Heels, and Under Medical Devices         Does the Patient have a Wound? No noted wound(s)       Ric Prevention initiated by RN: Yes  Wound Care Orders initiated by RN: No    Pressure Injury (Stage 3,4, Unstageable, DTI, NWPT, and Complex wounds) if present, place Wound referral order by RN under : No    New Ostomies, if present place, Ostomy referral order under : No     Nurse 1 eSignature: Electronically signed by Adrianna Stringer RN on 6/20/25 at 4:23 PM EDT    **SHARE this note so that the co-signing nurse can place an eSignature**    Nurse 2 eSignature: Electronically signed by Elsy Barroso RN on 6/20/25 at 4:23 PM EDT

## 2025-06-20 NOTE — ED PROVIDER NOTES
Emergency Department Provider Note       [facility] EMERGENCY DEPT   PCP: None, None   Age: 49 y.o.   Sex: male     DISPOSITION Decision To Admit 06/20/2025 12:52:08 PM    ICD-10-CM    1. COPD exacerbation (HCC)  J44.1       2. Hypoxemia  R09.02       3. Bronchitis with bronchospasm  J20.9           Medical Decision Making     Abdomen is benign but patient is hypoxic and requiring oxygen and clinical exams concerning for pneumonia.  Sepsis workup initiated.  Antibiotics for commune acquired pneumonia ordered.    12:54 PM  Chest x-ray negative which is surprising.  PE is strong contender for diagnosis at this time.  Will order CT PE protocol.  Given abdominal pain (although he did not seem tender) will image the abdomen as well patient is quite ill and a poor historian  ED Course as of 06/20/25 1254   Fri Jun 20, 2025   1248 Patient came back from CT had been taken off of oxygen for that trip.  Oxygen saturations declined and nurse had to place on nonrebreather for a short time to catch him up.  Patient back on 6 L humidified oxygen at this time and looks comfortable with oxygen saturation of 96%.  Lung sounds bilaterally are coarse with rhonchi and occasional wheezing.  My read of CT showed some emphysematous blebs but no obvious PE or infiltrate.  Continuous albuterol ordered [KM]      ED Course User Index  [KM] Partha Houston MD     1 or more acute illnesses that pose a threat to life or bodily function.   Drug therapy given requiring intensive monitoring for toxicity.  Shared medical decision making was utilized in creating the patients health plan today.  I independently ordered and reviewed each unique test.    I reviewed external records: ED visit note from a different ED.   I reviewed external records: provider visit note from outside specialist.   The patients assessment required an independent historian: EMS.  The reason they were needed is important historical information not provided by the patient.    I  Mouth/Throat:      Mouth: Mucous membranes are moist.   Eyes:      Conjunctiva/sclera: Conjunctivae normal.      Pupils: Pupils are equal, round, and reactive to light.   Cardiovascular:      Rate and Rhythm: Normal rate and regular rhythm.      Pulses: Normal pulses.      Heart sounds: Normal heart sounds.   Pulmonary:      Effort: Pulmonary effort is normal.      Breath sounds: Rhonchi and rales present.      Comments: Significant Rales and rhonchi left lung few Rales and rhonchi right lower lobe  Abdominal:      General: There is no distension.      Palpations: Abdomen is soft.      Tenderness: There is no abdominal tenderness. There is no guarding or rebound.   Musculoskeletal:         General: Normal range of motion.   Skin:     General: Skin is warm and dry.   Neurological:      Mental Status: He is alert and oriented to person, place, and time.   Psychiatric:         Behavior: Behavior normal.          Procedures     Procedures    Orders placed during this emergency department visit:     Orders Placed This Encounter   Procedures    Blood Culture 1    Blood Culture 2    XR CHEST PORTABLE    CT CHEST PULMONARY EMBOLISM W CONTRAST    CT ABDOMEN PELVIS W IV CONTRAST Additional Contrast? None    Comprehensive Metabolic Panel    CBC with Auto Differential    Lactate, Sepsis    Procalcitonin    Urinalysis w/Reflex to Micro    Neurologic Status Assessment    Strict intake and output    Vital Signs Per Unit Routine    IP Consult to Vascular Access Team    EKG 12 Lead    Saline lock IV        Medications given during this emergency department visit:     Medications   cefTRIAXone (ROCEPHIN) 1,000 mg in sterile water 10 mL IV syringe (1,000 mg IntraVENous Given 6/20/25 1203)     And   azithromycin (ZITHROMAX) 500 mg in sodium chloride 0.9 % 250 mL IVPB ([id number]) (500 mg IntraVENous New Bag 6/20/25 1214)   albuterol (PROVENTIL) (2.5 MG/3ML) 0.083% nebulizer solution 10 mg (has no administration in time range)

## 2025-06-20 NOTE — PROGRESS NOTES
Patient arrived to room 836, via stretcher; accompanied by transport. Patient awake, A/O; ambulatory to bedside, without difficulty. NAD noted. Respirations present on 2LNC. Patient and spouse, at bedside, oriented to room, staff and surroundings. Encouraged to call for needs. Care ongoing.

## 2025-06-20 NOTE — ED NOTES
TRANSFER - OUT REPORT:    Verbal report given to RN on Mike Garces  being transferred to [facility] for routine progression of patient care       Report consisted of patient's Situation, Background, Assessment and   Recommendations(SBAR).     Information from the following report(s) Nurse Handoff Report, ED Encounter Summary, ED SBAR, Adult Overview, Intake/Output, MAR, and Recent Results was reviewed with the receiving nurse.    [place] Fall Assessment:    Presents to emergency department  because of falls (Syncope, seizure, or loss of consciousness): No  Age > 70: No  Altered Mental Status, Intoxication with alcohol or substance confusion (Disorientation, impaired judgment, poor safety awaremess, or inability to follow instructions): No  Impaired Mobility: Ambulates or transfers with assistive devices or assistance; Unable to ambulate or transer.: No  Nursing Judgement: No          Lines:   Peripheral IV 06/20/25 Left;Anterior Forearm (Active)   Site Assessment Clean, dry & intact 06/20/25 1308   Line Status Blood return noted;Flushed;Normal saline locked 06/20/25 1308   Line Care Connections checked and tightened 06/20/25 1308   Phlebitis Assessment No symptoms 06/20/25 1308   Infiltration Assessment 0 06/20/25 1308   Alcohol Cap Used Yes 06/20/25 1308   Dressing Status Clean, dry & intact;New dressing applied 06/20/25 1308   Dressing Type Transparent 06/20/25 1308   Dressing Intervention New 06/20/25 1308        Opportunity for questions and clarification was provided.      Patient transported with:  O2 @ 3lpm and Tamia Becerra RN  06/20/25 [id number]

## 2025-06-21 PROBLEM — F11.93 OPIOID WITHDRAWAL (HCC): Status: ACTIVE | Noted: 2025-06-21

## 2025-06-21 LAB
ANION GAP SERPL CALC-SCNC: 13 MMOL/L (ref 7–16)
BASOPHILS # BLD: 0.01 K/UL (ref 0–0.2)
BASOPHILS NFR BLD: 0.1 % (ref 0–2)
BUN SERPL-MCNC: 19 MG/DL (ref 6–23)
CALCIUM SERPL-MCNC: 9.4 MG/DL (ref 8.8–10.2)
CHLORIDE SERPL-SCNC: 102 MMOL/L (ref 98–107)
CO2 SERPL-SCNC: 23 MMOL/L (ref 20–29)
CREAT SERPL-MCNC: 1.01 MG/DL (ref 0.8–1.3)
DIFFERENTIAL METHOD BLD: ABNORMAL
EOSINOPHIL # BLD: 0 K/UL (ref 0–0.8)
EOSINOPHIL NFR BLD: 0 % (ref 0.5–7.8)
ERYTHROCYTE [DISTWIDTH] IN BLOOD BY AUTOMATED COUNT: 13 % (ref 11.9–14.6)
GLUCOSE SERPL-MCNC: 104 MG/DL (ref 70–99)
HCT VFR BLD AUTO: 37.7 % (ref 41.1–50.3)
HGB BLD-MCNC: 12.9 G/DL (ref 13.6–17.2)
IMM GRANULOCYTES # BLD AUTO: 0.04 K/UL (ref 0–0.5)
IMM GRANULOCYTES NFR BLD AUTO: 0.3 % (ref 0–5)
LYMPHOCYTES # BLD: 1.46 K/UL (ref 0.5–4.6)
LYMPHOCYTES NFR BLD: 11.9 % (ref 13–44)
MCH RBC QN AUTO: 28 PG (ref 26.1–32.9)
MCHC RBC AUTO-ENTMCNC: 34.2 G/DL (ref 31.4–35)
MCV RBC AUTO: 81.8 FL (ref 82–102)
MONOCYTES # BLD: 1.08 K/UL (ref 0.1–1.3)
MONOCYTES NFR BLD: 8.8 % (ref 4–12)
NEUTS SEG # BLD: 9.63 K/UL (ref 1.7–8.2)
NEUTS SEG NFR BLD: 78.9 % (ref 43–78)
NRBC # BLD: 0 K/UL (ref 0–0.2)
PLATELET # BLD AUTO: 224 K/UL (ref 150–450)
PMV BLD AUTO: 11 FL (ref 9.4–12.3)
POTASSIUM SERPL-SCNC: 4.2 MMOL/L (ref 3.5–5.1)
RBC # BLD AUTO: 4.61 M/UL (ref 4.23–5.6)
SODIUM SERPL-SCNC: 137 MMOL/L (ref 136–145)
WBC # BLD AUTO: 12.2 K/UL (ref 4.3–11.1)

## 2025-06-21 PROCEDURE — 85025 COMPLETE CBC W/AUTO DIFF WBC: CPT

## 2025-06-21 PROCEDURE — 80048 BASIC METABOLIC PNL TOTAL CA: CPT

## 2025-06-21 PROCEDURE — 2500000003 HC RX 250 WO HCPCS: Performed by: FAMILY MEDICINE

## 2025-06-21 PROCEDURE — 94760 N-INVAS EAR/PLS OXIMETRY 1: CPT

## 2025-06-21 PROCEDURE — 2700000000 HC OXYGEN THERAPY PER DAY

## 2025-06-21 PROCEDURE — 6360000002 HC RX W HCPCS: Performed by: FAMILY MEDICINE

## 2025-06-21 PROCEDURE — 2060000000 HC ICU INTERMEDIATE R&B

## 2025-06-21 PROCEDURE — 6370000000 HC RX 637 (ALT 250 FOR IP): Performed by: FAMILY MEDICINE

## 2025-06-21 PROCEDURE — 94669 MECHANICAL CHEST WALL OSCILL: CPT

## 2025-06-21 PROCEDURE — 2580000003 HC RX 258: Performed by: FAMILY MEDICINE

## 2025-06-21 PROCEDURE — 36415 COLL VENOUS BLD VENIPUNCTURE: CPT

## 2025-06-21 PROCEDURE — 94761 N-INVAS EAR/PLS OXIMETRY MLT: CPT

## 2025-06-21 PROCEDURE — 94640 AIRWAY INHALATION TREATMENT: CPT

## 2025-06-21 RX ADMIN — DOXYCYCLINE HYCLATE 100 MG: 100 CAPSULE ORAL at 20:41

## 2025-06-21 RX ADMIN — SODIUM CHLORIDE: 0.9 INJECTION, SOLUTION INTRAVENOUS at 04:04

## 2025-06-21 RX ADMIN — IPRATROPIUM BROMIDE AND ALBUTEROL SULFATE 1 DOSE: 2.5; .5 SOLUTION RESPIRATORY (INHALATION) at 19:13

## 2025-06-21 RX ADMIN — Medication 4 ML: at 08:06

## 2025-06-21 RX ADMIN — PANTOPRAZOLE SODIUM 40 MG: 40 TABLET, DELAYED RELEASE ORAL at 06:03

## 2025-06-21 RX ADMIN — IPRATROPIUM BROMIDE AND ALBUTEROL SULFATE 1 DOSE: 2.5; .5 SOLUTION RESPIRATORY (INHALATION) at 08:06

## 2025-06-21 RX ADMIN — WATER 1000 MG: 1 INJECTION INTRAMUSCULAR; INTRAVENOUS; SUBCUTANEOUS at 06:03

## 2025-06-21 RX ADMIN — SODIUM CHLORIDE, PRESERVATIVE FREE 10 ML: 5 INJECTION INTRAVENOUS at 09:25

## 2025-06-21 RX ADMIN — SODIUM CHLORIDE, PRESERVATIVE FREE 10 ML: 5 INJECTION INTRAVENOUS at 20:42

## 2025-06-21 RX ADMIN — Medication 4 ML: at 19:13

## 2025-06-21 RX ADMIN — HYDROXYZINE PAMOATE 50 MG: 25 CAPSULE ORAL at 20:41

## 2025-06-21 RX ADMIN — BUPRENORPHINE HCL 4 MG: 2 TABLET SUBLINGUAL at 19:41

## 2025-06-21 RX ADMIN — CYCLOBENZAPRINE HYDROCHLORIDE 10 MG: 10 TABLET, FILM COATED ORAL at 13:34

## 2025-06-21 RX ADMIN — ENOXAPARIN SODIUM 40 MG: 100 INJECTION SUBCUTANEOUS at 09:24

## 2025-06-21 RX ADMIN — DOXYCYCLINE HYCLATE 100 MG: 100 CAPSULE ORAL at 09:24

## 2025-06-21 RX ADMIN — IPRATROPIUM BROMIDE AND ALBUTEROL SULFATE 1 DOSE: 2.5; .5 SOLUTION RESPIRATORY (INHALATION) at 14:40

## 2025-06-21 RX ADMIN — HYDROXYZINE PAMOATE 50 MG: 25 CAPSULE ORAL at 13:34

## 2025-06-21 ASSESSMENT — PAIN SCALES - GENERAL: PAINLEVEL_OUTOF10: 0

## 2025-06-21 NOTE — PROGRESS NOTES
Hospitalist Progress Note   Admit Date:  2025  9:56 AM   Name:  Mike Garces   Age:  49 y.o.  Sex:  male  :  1975   MRN:  [id number]   Room:  [facility]    Presenting/Chief Complaint: Abdominal Pain     Reason(s) for Admission: Hypoxemia [R09.02]  Bronchitis with bronchospasm [J20.9]  COPD exacerbation (HCC) [J44.1]  Acute hypoxic respiratory failure (HCC) [J96.01]     Hospital Course:   Mike Garces is a 49 y.o. male with medical history of fentanyl and cocaine abuse, patient has been in [facility] for detox from fentanyl.     Triage  Arrives via [facility] from [facility] for detox from Fentanyl. States has been there for 9 days.   C/O dark malodorous urine, N/V, upper abd pain, chest pain and SHOB.        RA 88%- does not wear oxygen at baseline. Placed onto 2L NC at 94%     Patient says he was admitted on Tuesday, since Wednesday started having nausea mild vomiting mild cough, intermittent diarrhea, started having mild shortness of breath since yesterday, brought to emergency room for further evaluation.     Patient otherwise did not complain of any headache or dizziness or fever or chills or chest pain.     Respirate max of 23, oxygen saturation 88% on room air, presently on oxygen 2 L/min.  Urine drug screen positive for amphetamines, cocaine and THC  CT chest PE protocol-  IMPRESSION:  1.  No pulmonary embolus identified.   2.  Extensive mucous/debris throughout the lower lobe airways bilaterally.   3.  Mild atelectasis or scarring in the left lung base.   4.  Mild to moderate paraseptal emphysema with bullous changes.  CT abdomen pelvis with contrast shows  No acute findings, cholelithiasis     Patient will be admitted for acute hypoxic respiratory failure, opioid withdrawal.      Subjective & 24hr Events:   On oxygen 2 L/min, says cough and breathing mild better, complaining of mild diarrhea  Received 1 dose of buprenorphine last night, received a dose of Suboxone at

## 2025-06-21 NOTE — CARE COORDINATION
06/21/25 1013   Service Assessment   Patient Orientation Alert and Oriented   Cognition Alert   History Provided By Patient   Primary Caregiver Self   Accompanied By/Relationship no one at bedside   Support Systems Spouse/Significant Other   Patient's Healthcare Decision Maker is: Legal Next of Kin   PCP Verified by CM No   Prior Functional Level Independent in ADLs/IADLs   Current Functional Level Independent in ADLs/IADLs   Can patient return to prior living arrangement Unknown at present   Ability to make needs known: Poor   Family able to assist with home care needs: Yes   Would you like for me to discuss the discharge plan with any other family members/significant others, and if so, who? Yes   Financial Resources Medicare   Community Resources None   Social/Functional History   Lives With Son   Type of Home House   Home Equipment None   Receives Help From Family   Prior Level of Assist for ADLs Independent   Prior Level of Assist for Homemaking Independent   Homemaking Responsibilities No   Ambulation Assistance Independent   Prior Level of Assist for Transfers Independent   Active  Yes   Mode of Transportation Car   Occupation Unemployed   Discharge Planning   Type of Residence House   Living Arrangements Spouse/Significant Other   Current Services Prior To Admission None   Potential Assistance Needed Other (Comment)  (recovery information)   DME Ordered? No   Potential Assistance Purchasing Medications No   Type of Home Care Services None   Patient expects to be discharged to: House     Patient lives with his wife. Independent with ambulation and ADLs. Patient is interested in recovery information. CM will refer to [facility] with [employer] for recovery resources.  Patient doesn't have a pcp and is uninsured. CM will give patient information on free services for follow up.   DME: none  CM following.

## 2025-06-21 NOTE — H&P
Ricky Chavez MD  Physician  Internal Medicine     Progress Notes     Addendum     Date of Service: 2025  2:45 PM     Expand All Collapse All          Hospitalist History and Physical   Admit Date:     2025  9:56 AM   Name:              Mike Garces   Age:                 49 y.o.  Sex:                 male  :               1975   MRN:               [id number]   Room:              [facility]     Presenting/Chief Complaint: Abdominal Pain     Reason(s) for Admission: Hypoxemia [R09.02]  Bronchitis with bronchospasm [J20.9]  COPD exacerbation (HCC) [J44.1]  Acute hypoxic respiratory failure (HCC) [J96.01]      History of Present Illness:   Mike Garces is a 49 y.o. male with medical history of fentanyl and cocaine abuse, patient has been in [facility] for detox from fentanyl.     Triage  Arrives via [facility] from [facility] for detox from Fentanyl. States has been there for 9 days.   C/O dark malodorous urine, N/V, upper abd pain, chest pain and SHOB.        RA 88%- does not wear oxygen at baseline. Placed onto 2L NC at 94%     Patient says he was admitted on Tuesday, since Wednesday started having nausea mild vomiting mild cough, intermittent diarrhea, started having mild shortness of breath since yesterday, brought to emergency room for further evaluation.     Patient otherwise did not complain of any headache or dizziness or fever or chills or chest pain.     Respirate max of 23, oxygen saturation 88% on room air, presently on oxygen 2 L/min.  Urine drug screen positive for amphetamines, cocaine and THC  CT chest PE protocol-  IMPRESSION:  1.  No pulmonary embolus identified.   2.  Extensive mucous/debris throughout the lower lobe airways bilaterally.   3.  Mild atelectasis or scarring in the left lung base.   4.  Mild to moderate paraseptal emphysema with bullous changes.  CT abdomen pelvis with contrast shows  No acute findings, cholelithiasis     Patient will be admitted for  hours.     CT CHEST PULMONARY EMBOLISM W CONTRAST  Result Date: 6/20/2025  EXAMINATION: CT CHEST PULMONARY EMBOLISM W CONTRAST EXAM DATE: 6/20/2025 12:27 PM INDICATION: Shortness of breath, hypoxia, normal chest x-ray-PE? COMPARISON: 6/20/2025 TECHNIQUE: Axial CT images were obtained of the chest with intravenous contrast in the pulmonary arterial phase. Multiplanar and MIP reconstructions were performed. CHEST FINDINGS: Lungs/Pleura: Mild to moderate paraseptal emphysema is present with bullous changes. There is a small amount of atelectasis or scarring in the left lower lobe. Mucous/debris is present throughout the lower lobe airways bilaterally. No pleural effusion or pneumothorax. Pulmonary Arteries: No evidence of pulmonary embolus. Cardiovascular: The heart is normal in size. The aorta is unremarkable. Pericardium: No effusion. Mediastinum: Unremarkable. Lymph Nodes: No lymph node enlargement by CT size criteria. Bones: No acute osseous abnormality. Soft tissues: Unremarkable. Upper Abdomen: Unremarkable.      1.  No pulmonary embolus identified. 2.  Extensive mucous/debris throughout the lower lobe airways bilaterally. 3.  Mild atelectasis or scarring in the left lung base. 4.  Mild to moderate paraseptal emphysema with bullous changes. Electronically signed by Mimi Sosa     CT ABDOMEN PELVIS W IV CONTRAST Additional Contrast? None  Result Date: 6/20/2025  EXAMINATION: CT ABDOMEN PELVIS W IV CONTRAST EXAM DATE: 6/20/2025 12:27 PM INDICATION: Abdominal pain COMPARISON: 6/20/2025 TECHNIQUE: Axial CT images were obtained of the abdomen and pelvis with intravenous contrast. Multiplanar reconstructions were performed. ABDOMEN/PELVIS FINDINGS: Lower Chest: Mild atelectasis or scarring is present in the left lower lobe. Liver: Normal enhancement and contour. Biliary/Gallbladder: The gallbladder is nondistended with a couple of gallstones noted within the lumen. Pancreas: Unremarkable. Spleen: Unremarkable.

## 2025-06-21 NOTE — PLAN OF CARE
Problem: Respiratory - Adult  Goal: Achieves optimal ventilation and oxygenation  Outcome: Progressing  Flowsheets (Taken 6/21/2025 0809)  Achieves optimal ventilation and oxygenation:   Assess for changes in respiratory status   Respiratory therapy support as indicated   Position to facilitate oxygenation and minimize respiratory effort   Encourage broncho-pulmonary hygiene including cough, deep breathe, incentive spirometry   Assess and instruct to report shortness of breath or any respiratory difficulty   Assess for changes in mentation and behavior     Problem: Discharge Planning  Goal: Discharge to home or other facility with appropriate resources  6/20/2025 2322 by Angelica Frost, RN  Outcome: Not Progressing

## 2025-06-22 LAB
ANION GAP SERPL CALC-SCNC: 15 MMOL/L (ref 7–16)
BASOPHILS # BLD: 0.05 K/UL (ref 0–0.2)
BASOPHILS NFR BLD: 0.7 % (ref 0–2)
BUN SERPL-MCNC: 17 MG/DL (ref 6–23)
CALCIUM SERPL-MCNC: 9.1 MG/DL (ref 8.8–10.2)
CHLORIDE SERPL-SCNC: 104 MMOL/L (ref 98–107)
CO2 SERPL-SCNC: 19 MMOL/L (ref 20–29)
CREAT SERPL-MCNC: 1.07 MG/DL (ref 0.8–1.3)
DIFFERENTIAL METHOD BLD: ABNORMAL
EOSINOPHIL # BLD: 0.07 K/UL (ref 0–0.8)
EOSINOPHIL NFR BLD: 1 % (ref 0.5–7.8)
ERYTHROCYTE [DISTWIDTH] IN BLOOD BY AUTOMATED COUNT: 12.8 % (ref 11.9–14.6)
GLUCOSE SERPL-MCNC: 91 MG/DL (ref 70–99)
HCT VFR BLD AUTO: 41 % (ref 41.1–50.3)
HGB BLD-MCNC: 13.7 G/DL (ref 13.6–17.2)
IMM GRANULOCYTES # BLD AUTO: 0.05 K/UL (ref 0–0.5)
IMM GRANULOCYTES NFR BLD AUTO: 0.7 % (ref 0–5)
LYMPHOCYTES # BLD: 2.01 K/UL (ref 0.5–4.6)
LYMPHOCYTES NFR BLD: 28.8 % (ref 13–44)
MAGNESIUM SERPL-MCNC: 2.4 MG/DL (ref 1.8–2.4)
MCH RBC QN AUTO: 27.8 PG (ref 26.1–32.9)
MCHC RBC AUTO-ENTMCNC: 33.4 G/DL (ref 31.4–35)
MCV RBC AUTO: 83.3 FL (ref 82–102)
MONOCYTES # BLD: 0.52 K/UL (ref 0.1–1.3)
MONOCYTES NFR BLD: 7.4 % (ref 4–12)
NEUTS SEG # BLD: 4.29 K/UL (ref 1.7–8.2)
NEUTS SEG NFR BLD: 61.4 % (ref 43–78)
NRBC # BLD: 0 K/UL (ref 0–0.2)
PLATELET # BLD AUTO: 222 K/UL (ref 150–450)
PMV BLD AUTO: 10.6 FL (ref 9.4–12.3)
POTASSIUM SERPL-SCNC: 3.8 MMOL/L (ref 3.5–5.1)
RBC # BLD AUTO: 4.92 M/UL (ref 4.23–5.6)
SODIUM SERPL-SCNC: 138 MMOL/L (ref 136–145)
WBC # BLD AUTO: 7 K/UL (ref 4.3–11.1)

## 2025-06-22 PROCEDURE — 83735 ASSAY OF MAGNESIUM: CPT

## 2025-06-22 PROCEDURE — 94640 AIRWAY INHALATION TREATMENT: CPT

## 2025-06-22 PROCEDURE — 6370000000 HC RX 637 (ALT 250 FOR IP): Performed by: FAMILY MEDICINE

## 2025-06-22 PROCEDURE — 94761 N-INVAS EAR/PLS OXIMETRY MLT: CPT

## 2025-06-22 PROCEDURE — 2500000003 HC RX 250 WO HCPCS: Performed by: FAMILY MEDICINE

## 2025-06-22 PROCEDURE — 80048 BASIC METABOLIC PNL TOTAL CA: CPT

## 2025-06-22 PROCEDURE — 6360000002 HC RX W HCPCS: Performed by: FAMILY MEDICINE

## 2025-06-22 PROCEDURE — 2060000000 HC ICU INTERMEDIATE R&B

## 2025-06-22 PROCEDURE — 2580000003 HC RX 258: Performed by: FAMILY MEDICINE

## 2025-06-22 PROCEDURE — 94669 MECHANICAL CHEST WALL OSCILL: CPT

## 2025-06-22 PROCEDURE — 36415 COLL VENOUS BLD VENIPUNCTURE: CPT

## 2025-06-22 PROCEDURE — 85025 COMPLETE CBC W/AUTO DIFF WBC: CPT

## 2025-06-22 RX ORDER — POTASSIUM CHLORIDE 1500 MG/1
40 TABLET, EXTENDED RELEASE ORAL ONCE
Status: COMPLETED | OUTPATIENT
Start: 2025-06-22 | End: 2025-06-22

## 2025-06-22 RX ORDER — SODIUM BICARBONATE 650 MG/1
1300 TABLET ORAL 2 TIMES DAILY
Status: DISCONTINUED | OUTPATIENT
Start: 2025-06-22 | End: 2025-06-24 | Stop reason: HOSPADM

## 2025-06-22 RX ADMIN — Medication 4 ML: at 08:27

## 2025-06-22 RX ADMIN — IPRATROPIUM BROMIDE AND ALBUTEROL SULFATE 1 DOSE: 2.5; .5 SOLUTION RESPIRATORY (INHALATION) at 14:26

## 2025-06-22 RX ADMIN — IPRATROPIUM BROMIDE AND ALBUTEROL SULFATE 1 DOSE: 2.5; .5 SOLUTION RESPIRATORY (INHALATION) at 21:20

## 2025-06-22 RX ADMIN — SODIUM BICARBONATE 650 MG TABLET 1300 MG: at 20:30

## 2025-06-22 RX ADMIN — CYCLOBENZAPRINE HYDROCHLORIDE 10 MG: 10 TABLET, FILM COATED ORAL at 20:30

## 2025-06-22 RX ADMIN — IPRATROPIUM BROMIDE AND ALBUTEROL SULFATE 1 DOSE: 2.5; .5 SOLUTION RESPIRATORY (INHALATION) at 08:27

## 2025-06-22 RX ADMIN — LOPERAMIDE HYDROCHLORIDE 2 MG: 2 CAPSULE ORAL at 10:12

## 2025-06-22 RX ADMIN — Medication 4 ML: at 21:20

## 2025-06-22 RX ADMIN — SODIUM CHLORIDE, PRESERVATIVE FREE 5 ML: 5 INJECTION INTRAVENOUS at 08:56

## 2025-06-22 RX ADMIN — SODIUM BICARBONATE 650 MG TABLET 1300 MG: at 10:17

## 2025-06-22 RX ADMIN — ENOXAPARIN SODIUM 40 MG: 100 INJECTION SUBCUTANEOUS at 08:56

## 2025-06-22 RX ADMIN — POTASSIUM CHLORIDE 40 MEQ: 1500 TABLET, EXTENDED RELEASE ORAL at 10:13

## 2025-06-22 RX ADMIN — CYCLOBENZAPRINE HYDROCHLORIDE 10 MG: 10 TABLET, FILM COATED ORAL at 08:59

## 2025-06-22 RX ADMIN — DIAZEPAM 2.5 MG: 10 INJECTION, SOLUTION INTRAMUSCULAR; INTRAVENOUS at 18:07

## 2025-06-22 RX ADMIN — SODIUM CHLORIDE, PRESERVATIVE FREE 10 ML: 5 INJECTION INTRAVENOUS at 20:31

## 2025-06-22 RX ADMIN — PANTOPRAZOLE SODIUM 40 MG: 40 TABLET, DELAYED RELEASE ORAL at 05:53

## 2025-06-22 RX ADMIN — WATER 1000 MG: 1 INJECTION INTRAMUSCULAR; INTRAVENOUS; SUBCUTANEOUS at 05:54

## 2025-06-22 RX ADMIN — DOXYCYCLINE HYCLATE 100 MG: 100 CAPSULE ORAL at 08:56

## 2025-06-22 RX ADMIN — DIAZEPAM 2.5 MG: 10 INJECTION, SOLUTION INTRAMUSCULAR; INTRAVENOUS at 10:26

## 2025-06-22 RX ADMIN — DOXYCYCLINE HYCLATE 100 MG: 100 CAPSULE ORAL at 20:30

## 2025-06-22 ASSESSMENT — PAIN SCALES - GENERAL
PAINLEVEL_OUTOF10: 0

## 2025-06-22 NOTE — PROGRESS NOTES
Assumed care of patient. Assessment completed and documented, see docflow. Patient awake, resting quietly in bed. NAD noted at present. Respirations even and non labored. Call light within reach. Will monitor.

## 2025-06-22 NOTE — PROGRESS NOTES
Hospitalist Progress Note   Admit Date:  2025  9:56 AM   Name:  Mike Garces   Age:  49 y.o.  Sex:  male  :  1975   MRN:  [id number]   Room:  [facility]    Presenting/Chief Complaint: Abdominal Pain     Reason(s) for Admission: Hypoxemia [R09.02]  Bronchitis with bronchospasm [J20.9]  COPD exacerbation (HCC) [J44.1]  Acute hypoxic respiratory failure (HCC) [J96.01]     Hospital Course:   Mike Garces is a 49 y.o. male with medical history of fentanyl and cocaine abuse, patient has been in [facility] for detox from fentanyl.     Triage  Arrives via [facility] from [facility] for detox from Fentanyl. States has been there for 9 days.   C/O dark malodorous urine, N/V, upper abd pain, chest pain and SHOB.        RA 88%- does not wear oxygen at baseline. Placed onto 2L NC at 94%     Patient says he was admitted on Tuesday, since Wednesday started having nausea mild vomiting mild cough, intermittent diarrhea, started having mild shortness of breath since yesterday, brought to emergency room for further evaluation.     Patient otherwise did not complain of any headache or dizziness or fever or chills or chest pain.     Respirate max of 23, oxygen saturation 88% on room air, presently on oxygen 2 L/min.  Urine drug screen positive for amphetamines, cocaine and THC  CT chest PE protocol-  IMPRESSION:  1.  No pulmonary embolus identified.   2.  Extensive mucous/debris throughout the lower lobe airways bilaterally.   3.  Mild atelectasis or scarring in the left lung base.   4.  Mild to moderate paraseptal emphysema with bullous changes.  CT abdomen pelvis with contrast shows  No acute findings, cholelithiasis     Patient will be admitted for acute hypoxic respiratory failure, opioid withdrawal.      Subjective & 24hr Events:   Off oxygen, says feels better, improved cough, says still have diarrhea, since admission has been using 1 dose of buprenorphine daily  Potassium 3.8, hemolyzed

## 2025-06-22 NOTE — PLAN OF CARE
Problem: Discharge Planning  Goal: Discharge to home or other facility with appropriate resources  6/22/2025 0941 by Adrianna Stringer RN  Outcome: Progressing  6/22/2025 0103 by Raiza Butcher RN  Outcome: Progressing     Problem: Pain  Goal: Verbalizes/displays adequate comfort level or baseline comfort level  6/22/2025 0941 by Adrianna Stringer RN  Outcome: Progressing  6/22/2025 0103 by Raiza Butcher RN  Outcome: Progressing     Problem: Seizure Precautions  Goal: Remains free of injury related to seizures activity  6/22/2025 0941 by Adrianna Stringer RN  Outcome: Progressing  Flowsheets  Taken 6/22/2025 0554 by Raiza Butcher RN  Remains free of injury related to seizure activity: Monitor patient for seizure activity, document and report duration and description of seizure to Licensed Independent Practitioner  Taken 6/22/2025 0255 by Raiza Butcher RN  Remains free of injury related to seizure activity: Monitor patient for seizure activity, document and report duration and description of seizure to Licensed Independent Practitioner  6/22/2025 0103 by Raiza Butcher RN  Outcome: Progressing  Flowsheets (Taken 6/21/2025 1925)  Remains free of injury related to seizure activity: Monitor patient for seizure activity, document and report duration and description of seizure to Licensed Independent Practitioner     Problem: Safety - Adult  Goal: Free from fall injury  6/22/2025 0941 by Adrianna Stringer RN  Outcome: Progressing  6/22/2025 0103 by Raiza Butcher RN  Outcome: Progressing     Problem: Respiratory - Adult  Goal: Achieves optimal ventilation and oxygenation  6/22/2025 0941 by Adrianna Stringer RN  Outcome: Progressing  6/22/2025 0103 by Raiza Butcher RN  Outcome: Progressing

## 2025-06-23 LAB
ANION GAP SERPL CALC-SCNC: 12 MMOL/L (ref 7–16)
BUN SERPL-MCNC: 18 MG/DL (ref 6–23)
CALCIUM SERPL-MCNC: 9.4 MG/DL (ref 8.8–10.2)
CHLORIDE SERPL-SCNC: 107 MMOL/L (ref 98–107)
CO2 SERPL-SCNC: 20 MMOL/L (ref 20–29)
CREAT SERPL-MCNC: 1.03 MG/DL (ref 0.8–1.3)
GLUCOSE SERPL-MCNC: 101 MG/DL (ref 70–99)
MAGNESIUM SERPL-MCNC: 2.3 MG/DL (ref 1.8–2.4)
POTASSIUM SERPL-SCNC: 4 MMOL/L (ref 3.5–5.1)
SODIUM SERPL-SCNC: 139 MMOL/L (ref 136–145)

## 2025-06-23 PROCEDURE — 94664 DEMO&/EVAL PT USE INHALER: CPT

## 2025-06-23 PROCEDURE — 6360000002 HC RX W HCPCS: Performed by: FAMILY MEDICINE

## 2025-06-23 PROCEDURE — 2580000003 HC RX 258: Performed by: FAMILY MEDICINE

## 2025-06-23 PROCEDURE — 94669 MECHANICAL CHEST WALL OSCILL: CPT

## 2025-06-23 PROCEDURE — 6370000000 HC RX 637 (ALT 250 FOR IP): Performed by: NURSE PRACTITIONER

## 2025-06-23 PROCEDURE — 2060000000 HC ICU INTERMEDIATE R&B

## 2025-06-23 PROCEDURE — 97161 PT EVAL LOW COMPLEX 20 MIN: CPT

## 2025-06-23 PROCEDURE — 6370000000 HC RX 637 (ALT 250 FOR IP): Performed by: FAMILY MEDICINE

## 2025-06-23 PROCEDURE — 94760 N-INVAS EAR/PLS OXIMETRY 1: CPT

## 2025-06-23 PROCEDURE — 2500000003 HC RX 250 WO HCPCS: Performed by: FAMILY MEDICINE

## 2025-06-23 PROCEDURE — 94761 N-INVAS EAR/PLS OXIMETRY MLT: CPT

## 2025-06-23 PROCEDURE — 36415 COLL VENOUS BLD VENIPUNCTURE: CPT

## 2025-06-23 PROCEDURE — 83735 ASSAY OF MAGNESIUM: CPT

## 2025-06-23 PROCEDURE — 97530 THERAPEUTIC ACTIVITIES: CPT

## 2025-06-23 PROCEDURE — 94640 AIRWAY INHALATION TREATMENT: CPT

## 2025-06-23 PROCEDURE — 80048 BASIC METABOLIC PNL TOTAL CA: CPT

## 2025-06-23 RX ORDER — TRAZODONE HYDROCHLORIDE 50 MG/1
25 TABLET ORAL NIGHTLY PRN
Status: DISCONTINUED | OUTPATIENT
Start: 2025-06-23 | End: 2025-06-24 | Stop reason: HOSPADM

## 2025-06-23 RX ADMIN — SODIUM BICARBONATE 650 MG TABLET 1300 MG: at 08:56

## 2025-06-23 RX ADMIN — DOXYCYCLINE HYCLATE 100 MG: 100 CAPSULE ORAL at 20:31

## 2025-06-23 RX ADMIN — CYCLOBENZAPRINE HYDROCHLORIDE 10 MG: 10 TABLET, FILM COATED ORAL at 08:55

## 2025-06-23 RX ADMIN — SODIUM CHLORIDE, PRESERVATIVE FREE 10 ML: 5 INJECTION INTRAVENOUS at 08:56

## 2025-06-23 RX ADMIN — CYCLOBENZAPRINE HYDROCHLORIDE 10 MG: 10 TABLET, FILM COATED ORAL at 15:11

## 2025-06-23 RX ADMIN — LOPERAMIDE HYDROCHLORIDE 2 MG: 2 CAPSULE ORAL at 08:55

## 2025-06-23 RX ADMIN — WATER 1000 MG: 1 INJECTION INTRAMUSCULAR; INTRAVENOUS; SUBCUTANEOUS at 05:30

## 2025-06-23 RX ADMIN — IPRATROPIUM BROMIDE AND ALBUTEROL SULFATE 1 DOSE: 2.5; .5 SOLUTION RESPIRATORY (INHALATION) at 14:38

## 2025-06-23 RX ADMIN — CYCLOBENZAPRINE HYDROCHLORIDE 10 MG: 10 TABLET, FILM COATED ORAL at 20:31

## 2025-06-23 RX ADMIN — IPRATROPIUM BROMIDE AND ALBUTEROL SULFATE 1 DOSE: 2.5; .5 SOLUTION RESPIRATORY (INHALATION) at 07:39

## 2025-06-23 RX ADMIN — TRAZODONE HYDROCHLORIDE 25 MG: 50 TABLET ORAL at 20:30

## 2025-06-23 RX ADMIN — Medication 4 ML: at 07:39

## 2025-06-23 RX ADMIN — DIAZEPAM 2.5 MG: 10 INJECTION, SOLUTION INTRAMUSCULAR; INTRAVENOUS at 05:21

## 2025-06-23 RX ADMIN — SODIUM CHLORIDE, PRESERVATIVE FREE 10 ML: 5 INJECTION INTRAVENOUS at 20:32

## 2025-06-23 RX ADMIN — PANTOPRAZOLE SODIUM 40 MG: 40 TABLET, DELAYED RELEASE ORAL at 05:21

## 2025-06-23 RX ADMIN — DOXYCYCLINE HYCLATE 100 MG: 100 CAPSULE ORAL at 08:55

## 2025-06-23 RX ADMIN — SODIUM BICARBONATE 650 MG TABLET 1300 MG: at 20:30

## 2025-06-23 RX ADMIN — DIAZEPAM 2.5 MG: 10 INJECTION, SOLUTION INTRAMUSCULAR; INTRAVENOUS at 23:37

## 2025-06-23 RX ADMIN — DIAZEPAM 2.5 MG: 10 INJECTION, SOLUTION INTRAMUSCULAR; INTRAVENOUS at 11:28

## 2025-06-23 ASSESSMENT — PAIN SCALES - GENERAL
PAINLEVEL_OUTOF10: 0
PAINLEVEL_OUTOF10: 0

## 2025-06-23 NOTE — CARE COORDINATION
Patient admits to struggling with GINGER. He spent nine days at the [facility] before coming to us. He was discharged from the Detox. Patient is feeling hopeful about the future and is open to going to 12 step meetings. Patient also endorses interest in an abstaince based recovery meaning he doesn't want to take Suboxone.

## 2025-06-23 NOTE — PLAN OF CARE
Problem: Respiratory - Adult  Goal: Achieves optimal ventilation and oxygenation  6/23/2025 0743 by Darron Velez RCP  Outcome: Progressing  6/23/2025 0730 by Adrianna Stringer RN  Outcome: Progressing

## 2025-06-23 NOTE — PROGRESS NOTES
Hospitalist Progress Note   Admit Date:  2025  9:56 AM   Name:  Mike Garces   Age:  49 y.o.  Sex:  male  :  1975   MRN:  [id number]   Room:  [facility]/    Presenting/Chief Complaint: Abdominal Pain     Reason(s) for Admission: Hypoxemia [R09.02]  Bronchitis with bronchospasm [J20.9]  COPD exacerbation (HCC) [J44.1]  Acute hypoxic respiratory failure (HCC) [J96.01]     Hospital Course:   Mike Garces is a 49 y.o. male with medical history of fentanyl and cocaine abuse, patient has been in [facility] for detox from fentanyl.     Triage  Arrives via [facility] from [facility] for detox from Fentanyl. States has been there for 9 days.   C/O dark malodorous urine, N/V, upper abd pain, chest pain and SHOB.        RA 88%- does not wear oxygen at baseline. Placed onto 2L NC at 94%     Patient says he was admitted on Tuesday, since Wednesday started having nausea mild vomiting mild cough, intermittent diarrhea, started having mild shortness of breath since yesterday, brought to emergency room for further evaluation.     Patient otherwise did not complain of any headache or dizziness or fever or chills or chest pain.     Respirate max of 23, oxygen saturation 88% on room air, presently on oxygen 2 L/min.  Urine drug screen positive for amphetamines, cocaine and THC  CT chest PE protocol-  IMPRESSION:  1.  No pulmonary embolus identified.   2.  Extensive mucous/debris throughout the lower lobe airways bilaterally.   3.  Mild atelectasis or scarring in the left lung base.   4.  Mild to moderate paraseptal emphysema with bullous changes.  CT abdomen pelvis with contrast shows  No acute findings, cholelithiasis     Patient will be admitted for acute hypoxic respiratory failure, opioid withdrawal.    Course during the stay  Send from [facility] secondary to hypoxic respiratory failure.  CT chest findings of-Extensive mucous/debris throughout the lower lobe airways bilaterally..  Discussed with

## 2025-06-23 NOTE — THERAPY EVALUATION
ACUTE PHYSICAL THERAPY GOALS:   (Developed with and agreed upon by patient and/or caregiver.)  Pt will ambulate x 500 ft independently    GOAL MET    PHYSICAL THERAPY Initial Assessment, Daily Note, Discharge, and PM  (Link to Caseload Tracking: PT Visit Days : 1  Acknowledge Orders  Time In/Out  PT Charge Capture  Rehab Caseload Tracker    Mike Garces is a 49 y.o. male   PRIMARY DIAGNOSIS: Acute hypoxic respiratory failure (HCC)  Hypoxemia [R09.02]  Bronchitis with bronchospasm [J20.9]  COPD exacerbation (HCC) [J44.1]  Acute hypoxic respiratory failure (HCC) [J96.01]       Reason for Referral: Generalized Muscle Weakness (M62.81)  Inpatient: Payor: /     ASSESSMENT:     REHAB RECOMMENDATIONS:   Recommendation to date pending progress:  Setting:  No further skilled physical therapy after discharge from hospital    Equipment:    None     ASSESSMENT:  Mr. Garces is a pleasant 49 year old male who presents to [facility] from [facility] for detox with c/o N, V, cough, diarrhea, and mild SOB. He is diagnosed with acute hypoxic respiratory failure. Pt reports feeling better today. Has been up in room. Is independent with functional mobility and ADLs at baseline. This date pt performs mobility including bed mobility, transfers, and gait x 500 ft independently. Pt presents as functioning at his baseline, no acute PT intervention is indicated at this time. Will discharge from PT caseload. Please re-consult if any new needs arise. Thank you. No needs are anticipated upon d/c.     [facility] AM-PAC™ “6 Clicks” Basic Mobility Inpatient Short Form  AM-PAC Basic Mobility - Inpatient   How much help is needed turning from your back to your side while in a flat bed without using bedrails?: None  How much help is needed moving from lying on your back to sitting on the side of a flat bed without using bedrails?: None  How much help is needed moving to and from a bed to a chair?: None  How much help is needed standing

## 2025-06-23 NOTE — PROGRESS NOTES
Assumed care of patient. Assessment completed and documented, see docflow. Patient resting quietly in bed. NAD noted at present. Respirations even and non labored on RA. Awakes to voice, A/O. Verbalized understanding to call for needs. Call light within reach. Will monitor.

## 2025-06-23 NOTE — PLAN OF CARE
Problem: Discharge Planning  Goal: Discharge to home or other facility with appropriate resources  Outcome: Progressing  Flowsheets (Taken 6/23/2025 0725)  Discharge to home or other facility with appropriate resources: Identify barriers to discharge with patient and caregiver     Problem: Pain  Goal: Verbalizes/displays adequate comfort level or baseline comfort level  Outcome: Progressing     Problem: Seizure Precautions  Goal: Remains free of injury related to seizures activity  Outcome: Progressing     Problem: Safety - Adult  Goal: Free from fall injury  Outcome: Progressing     Problem: Respiratory - Adult  Goal: Achieves optimal ventilation and oxygenation  Outcome: Progressing     Problem: Skin/Tissue Integrity  Goal: Skin integrity remains intact  Description: 1.  Monitor for areas of redness and/or skin breakdown  2.  Assess vascular access sites hourly  3.  Every 4-6 hours minimum:  Change oxygen saturation probe site  4.  Every 4-6 hours:  If on nasal continuous positive airway pressure, respiratory therapy assess nares and determine need for appliance change or resting period  Outcome: Progressing  Flowsheets (Taken 6/23/2025 0725)  Skin Integrity Remains Intact: Monitor for areas of redness and/or skin breakdown

## 2025-06-24 VITALS
BODY MASS INDEX: 19.1 KG/M2 | RESPIRATION RATE: 16 BRPM | TEMPERATURE: 99.1 F | DIASTOLIC BLOOD PRESSURE: 89 MMHG | WEIGHT: 148.8 LBS | SYSTOLIC BLOOD PRESSURE: 124 MMHG | HEIGHT: 74 IN | OXYGEN SATURATION: 100 % | HEART RATE: 98 BPM

## 2025-06-24 LAB
ANION GAP SERPL CALC-SCNC: 12 MMOL/L (ref 7–16)
BUN SERPL-MCNC: 16 MG/DL (ref 6–23)
CALCIUM SERPL-MCNC: 9.4 MG/DL (ref 8.8–10.2)
CHLORIDE SERPL-SCNC: 107 MMOL/L (ref 98–107)
CO2 SERPL-SCNC: 22 MMOL/L (ref 20–29)
CREAT SERPL-MCNC: 1.01 MG/DL (ref 0.8–1.3)
GLUCOSE SERPL-MCNC: 101 MG/DL (ref 70–99)
POTASSIUM SERPL-SCNC: 3.8 MMOL/L (ref 3.5–5.1)
SODIUM SERPL-SCNC: 140 MMOL/L (ref 136–145)

## 2025-06-24 PROCEDURE — 2580000003 HC RX 258: Performed by: FAMILY MEDICINE

## 2025-06-24 PROCEDURE — 6370000000 HC RX 637 (ALT 250 FOR IP): Performed by: FAMILY MEDICINE

## 2025-06-24 PROCEDURE — 80048 BASIC METABOLIC PNL TOTAL CA: CPT

## 2025-06-24 PROCEDURE — 94760 N-INVAS EAR/PLS OXIMETRY 1: CPT

## 2025-06-24 PROCEDURE — 36415 COLL VENOUS BLD VENIPUNCTURE: CPT

## 2025-06-24 PROCEDURE — 94669 MECHANICAL CHEST WALL OSCILL: CPT

## 2025-06-24 PROCEDURE — 97165 OT EVAL LOW COMPLEX 30 MIN: CPT

## 2025-06-24 PROCEDURE — 2500000003 HC RX 250 WO HCPCS: Performed by: FAMILY MEDICINE

## 2025-06-24 PROCEDURE — 6360000002 HC RX W HCPCS: Performed by: FAMILY MEDICINE

## 2025-06-24 PROCEDURE — 94640 AIRWAY INHALATION TREATMENT: CPT

## 2025-06-24 RX ORDER — DOXYCYCLINE 100 MG/1
100 CAPSULE ORAL EVERY 12 HOURS SCHEDULED
Qty: 3 CAPSULE | Refills: 0 | Status: SHIPPED | OUTPATIENT
Start: 2025-06-24 | End: 2025-06-26

## 2025-06-24 RX ORDER — SODIUM BICARBONATE 650 MG/1
1300 TABLET ORAL 2 TIMES DAILY
Qty: 60 TABLET | Refills: 0 | Status: SHIPPED | OUTPATIENT
Start: 2025-06-24

## 2025-06-24 RX ADMIN — LOPERAMIDE HYDROCHLORIDE 2 MG: 2 CAPSULE ORAL at 09:40

## 2025-06-24 RX ADMIN — IPRATROPIUM BROMIDE AND ALBUTEROL SULFATE 1 DOSE: 2.5; .5 SOLUTION RESPIRATORY (INHALATION) at 07:38

## 2025-06-24 RX ADMIN — WATER 1000 MG: 1 INJECTION INTRAMUSCULAR; INTRAVENOUS; SUBCUTANEOUS at 06:28

## 2025-06-24 RX ADMIN — SODIUM BICARBONATE 650 MG TABLET 1300 MG: at 09:37

## 2025-06-24 RX ADMIN — PANTOPRAZOLE SODIUM 40 MG: 40 TABLET, DELAYED RELEASE ORAL at 06:24

## 2025-06-24 RX ADMIN — SODIUM CHLORIDE, PRESERVATIVE FREE 10 ML: 5 INJECTION INTRAVENOUS at 09:38

## 2025-06-24 RX ADMIN — DOXYCYCLINE HYCLATE 100 MG: 100 CAPSULE ORAL at 09:37

## 2025-06-24 RX ADMIN — Medication 4 ML: at 07:38

## 2025-06-24 NOTE — PROGRESS NOTES
ACUTE OCCUPATIONAL THERAPY GOALS:   (Developed with and agreed upon by patient and/or caregiver.)  Eval and discharge    OCCUPATIONAL THERAPY Initial Assessment, Discharge, and AM           Acknowledge Orders  Time  OT Charge Capture  Rehab Caseload Tracker      Mike Garces is a 49 y.o. male   PRIMARY DIAGNOSIS: Acute hypoxic respiratory failure (HCC)  Hypoxemia [R09.02]  Bronchitis with bronchospasm [J20.9]  COPD exacerbation (HCC) [J44.1]  Acute hypoxic respiratory failure (HCC) [J96.01]       Reason for Referral: Generalized Muscle Weakness (M62.81)  Inpatient: Payor: /     ASSESSMENT:     REHAB RECOMMENDATIONS:   Recommendation to date pending progress:  Setting:  No further skilled occupational therapy after discharge from hospital    Equipment:    None     ASSESSMENT:  Mr. Garces presents for abdominal pain and ARF d/t COPD exacerbation. Alert and doing well today. Of note, pt lives with family and independent at baseline. Resting on room air today. Completing functional transfers and mobility with independence and intact static and dynamic standing balance. Reports already completing ADLs independently in room and vocalizes no further issues. At this time, Mike Garces is functioning at baseline for ADLs and functional mobility. Will discharge from OT caseload.      [facility] AM-PAC™ “6 Clicks” Daily Activity Inpatient Short Form:    AM-PAC Daily Activity - Inpatient   How much help is needed for putting on and taking off regular lower body clothing?: None  How much help is needed for bathing (which includes washing, rinsing, drying)?: None  How much help is needed for toileting (which includes using toilet, bedpan, or urinal)?: None  How much help is needed for putting on and taking off regular upper body clothing?: None  How much help is needed for taking care of personal grooming?: None  How much help for eating meals?: None  AM-PAC Inpatient Daily Activity Raw Score: 24  AM-PAC  Inpatient ADL T-Scale Score : 57.54  ADL Inpatient CMS 0-100% Score: 0  ADL Inpatient CMS G-Code Modifier : CH           SUBJECTIVE:     Mr. Garces states, \"I am doing better.\"     Social/Functional Lives With: Son  Type of Home: House  Home Equipment: None  Receives Help From: Family  Prior Level of Assist for ADLs: Independent  Prior Level of Assist for Homemaking: Independent  Homemaking Responsibilities: No  Prior Level of Assist for Transfers: Independent  Active : Yes  Mode of Transportation: Car  Occupation: Unemployed    OBJECTIVE:     LINES / DRAINS / AIRWAY: IV    RESTRICTIONS/PRECAUTIONS:       PAIN: VITALS / O2:   Pre Treatment:      0/10    Post Treatment:     0/10   Vitals          Oxygen     RA       GROSS EVALUATION:  BUEs INTACT IMPAIRED   (See Comments)   UE AROM [x] []   UE PROM [x] []   Strength [x] Generally decreased; BUE WFL     Posture / Balance [x] intact unsupported sitting balance  intact static standing balance  intact dynamic standing balance   Sensation [x]     Coordination [x]       Tone [x]       Edema [x]    Activity Tolerance [x] Generally decreased w/ activity      Hand Dominance R [] L []      COGNITION/  PERCEPTION: INTACT IMPAIRED   (See Comments)   Orientation [x]     Vision [x]     Hearing [x]     Cognition  [x]     Perception [x]       MOBILITY: I Mod I S SBA CGA Min Mod Max Total  NT x2 Comments:   Bed Mobility    Rolling [x] [] [] [] [] [] [] [] [] [] []    Supine to Sit [x] [] [] [] [] [] [] [] [] [] []    Scooting [x] [] [] [] [] [] [] [] [] [] []    Sit to Supine [x] [] [] [] [] [] [] [] [] [] []    Transfers    Sit to Stand [] [] [] [] [] [] [] [] [] [] []    Bed to Chair [] [] [] [] [] [] [] [] [] [] []    Stand to Sit [] [] [] [] [] [] [] [] [] [] []    Tub/Shower [] [] [] [] [] [] [] [] [] [] []     Toilet [] [] [] [] [] [] [] [] [] [] []      [] [] [] [] [] [] [] [] [] [] []    I=Independent, Mod I=Modified Independent, S=Supervision/Setup, SBA=Standby

## 2025-06-24 NOTE — PROGRESS NOTES
Assumed care of patient. Assessment completed and documented, see docflow. Patient A/Ox3, resting quietly in bed. NAD noted at present. Respirations even and non labored. Verbalized understanding to call for needs. Call light within reach. Will monitor.

## 2025-06-24 NOTE — PLAN OF CARE
Problem: Respiratory - Adult  Goal: Achieves optimal ventilation and oxygenation  6/24/2025 0757 by Percy Arriola RCP  Outcome: Progressing  6/24/2025 0713 by Adrianna Stringer, RN  Outcome: Progressing  6/23/2025 2242 by Annamaria Barroso, RN  Outcome: Progressing  Flowsheets (Taken 6/23/2025 1930)  Achieves optimal ventilation and oxygenation:   Assess for changes in respiratory status   Assess for changes in mentation and behavior   Position to facilitate oxygenation and minimize respiratory effort   Oxygen supplementation based on oxygen saturation or arterial blood gases   Initiate smoking cessation protocol as indicated   Encourage broncho-pulmonary hygiene including cough, deep breathe, incentive spirometry   Assess the need for suctioning and aspirate as needed   Assess and instruct to report shortness of breath or any respiratory difficulty   Respiratory therapy support as indicated

## 2025-06-24 NOTE — CARE COORDINATION
06/24/25 1514   Services At/After Discharge   Transition of Care Consult (CM Consult) N/A   Services At/After Discharge None   [facility] Resource Information Provided? No   Mode of Transport at Discharge  [place]   Confirm Follow Up Transport Family   Condition of Participation: Discharge Planning   The Plan for Transition of Care is related to the following treatment goals: home with support   The Patient and/or Patient Representative was provided with a Choice of Provider? Patient   The Patient and/Or Patient Representative agree with the Discharge Plan? Yes   Freedom of Choice list was provided with basic dialogue that supports the patient's individualized plan of care/goals, treatment preferences, and shares the quality data associated with the providers?  Yes     Patient is discharging home with support. Family transported patient home.

## 2025-06-24 NOTE — DISCHARGE SUMMARY
Hospitalist Discharge Summary   Admit Date:  2025  9:56 AM   DC Note date: 2025  Name:  Mike Garces   Age:  49 y.o.  Sex:  male  :  1975   MRN:  [id number]   Room:  [facility]  PCP:  None, None    Presenting Complaint: Abdominal Pain     Initial Admission Diagnosis: Hypoxemia [R09.02]  Bronchitis with bronchospasm [J20.9]  COPD exacerbation (HCC) [J44.1]  Acute hypoxic respiratory failure (HCC) [J96.01]     Problem List for this Hospitalization (present on admission):    Principal Problem:    Acute hypoxic respiratory failure (HCC)  Active Problems:    Polysubstance abuse (HCC)    Bullous emphysema (HCC)    Opioid withdrawal (HCC)  Resolved Problems:    * No resolved hospital problems. *      Hospital Course:    Mr. Garces is a 48 yo male PMH:      -polysubstance use     Arriving from [facility] while there for detox with nausea/ emesis/ chest pain    Found jhypoxic 88% RA  Requited O2 that is weaned     UDS amphetamines and cocaine and THC    S/p CXR and CTA chest     \"     1.  No pulmonary embolus identified.     2.  Extensive mucous/debris throughout the lower lobe airways bilaterally.     3.  Mild atelectasis or scarring in the left lung base.     4.  Mild to moderate paraseptal emphysema with bullous changes.\"    CTAP no acute issues     He has been placed on a course of antibiotics with rocephin/ doxycycline     He has no further withdrawal complaints day of discharge   GI complaints resolved     Discharge plans are to home       Disposition: Home  Diet: ADULT DIET; Regular  Code Status: Full Code    Follow Ups:  Follow-up Information       Follow up With Specialties Details Why Contact Info    None, None  Follow up in 1 week(s)                    Follow up labs/diagnostics (ultimately defer to outpatient provider):      Plan was discussed with Patient.  All questions answered.  Patient was stable at time of discharge.  Instructions given to call a physician or return if any

## 2025-06-24 NOTE — PROGRESS NOTES
Discharge instructions and prescription information given and patient voiced understanding. Patient denies pain or shortness of breath. Patient discharged via ambulatory to home.

## 2025-06-24 NOTE — PLAN OF CARE
Problem: Discharge Planning  Goal: Discharge to home or other facility with appropriate resources  6/24/2025 0713 by Adrianna Stringer RN  Outcome: Progressing  6/23/2025 2242 by Annamaria Barroso RN  Outcome: Progressing  Flowsheets (Taken 6/23/2025 1930)  Discharge to home or other facility with appropriate resources:   Identify barriers to discharge with patient and caregiver   Arrange for needed discharge resources and transportation as appropriate   Identify discharge learning needs (meds, wound care, etc)   Refer to discharge planning if patient needs post-hospital services based on physician order or complex needs related to functional status, cognitive ability or social support system     Problem: Pain  Goal: Verbalizes/displays adequate comfort level or baseline comfort level  6/24/2025 0713 by Adrianna Stringer RN  Outcome: Progressing  6/23/2025 2242 by Annamaria Barroso RN  Outcome: Progressing  Flowsheets (Taken 6/23/2025 1930)  Verbalizes/displays adequate comfort level or baseline comfort level:   Encourage patient to monitor pain and request assistance   Assess pain using appropriate pain scale   Administer analgesics based on type and severity of pain and evaluate response   Implement non-pharmacological measures as appropriate and evaluate response   Consider cultural and social influences on pain and pain management   Notify Licensed Independent Practitioner if interventions unsuccessful or patient reports new pain     Problem: Seizure Precautions  Goal: Remains free of injury related to seizures activity  6/24/2025 0713 by Adrianna Stringer RN  Outcome: Progressing  6/23/2025 2242 by Annamaria Barroso RN  Outcome: Progressing     Problem: Safety - Adult  Goal: Free from fall injury  6/24/2025 0713 by Adrianna Stringer RN  Outcome: Progressing  6/23/2025 2242 by Annamaria Barroso RN  Outcome: Progressing     Problem: Respiratory - Adult  Goal: Achieves optimal ventilation and oxygenation  6/24/2025 0713

## 2025-06-24 NOTE — PLAN OF CARE
Problem: Discharge Planning  Goal: Discharge to home or other facility with appropriate resources  6/24/2025 1238 by Adrianna Stringer RN  Outcome: Progressing  6/24/2025 0713 by Adrianna Stringer RN  Outcome: Progressing  6/23/2025 2242 by Annamaria Barroso RN  Outcome: Progressing  Flowsheets (Taken 6/23/2025 1930)  Discharge to home or other facility with appropriate resources:   Identify barriers to discharge with patient and caregiver   Arrange for needed discharge resources and transportation as appropriate   Identify discharge learning needs (meds, wound care, etc)   Refer to discharge planning if patient needs post-hospital services based on physician order or complex needs related to functional status, cognitive ability or social support system     Problem: Pain  Goal: Verbalizes/displays adequate comfort level or baseline comfort level  6/24/2025 1238 by Adrianan Stringer RN  Outcome: Progressing  6/24/2025 0713 by Adrianna Stringer RN  Outcome: Progressing  6/23/2025 2242 by Annamaria Barroso RN  Outcome: Progressing  Flowsheets (Taken 6/23/2025 1930)  Verbalizes/displays adequate comfort level or baseline comfort level:   Encourage patient to monitor pain and request assistance   Assess pain using appropriate pain scale   Administer analgesics based on type and severity of pain and evaluate response   Implement non-pharmacological measures as appropriate and evaluate response   Consider cultural and social influences on pain and pain management   Notify Licensed Independent Practitioner if interventions unsuccessful or patient reports new pain     Problem: Seizure Precautions  Goal: Remains free of injury related to seizures activity  6/24/2025 1238 by Adrianna Stringer RN  Outcome: Progressing  6/24/2025 0713 by Adrianna Stringer RN  Outcome: Progressing  6/23/2025 2242 by Annamaria Barroso RN  Outcome: Progressing     Problem: Safety - Adult  Goal: Free from fall injury  6/24/2025 1238 by Myke  Every 4-6 hours:  If on nasal continuous positive airway pressure, assess nares and determine need for appliance change or resting period   Turn and reposition as indicated   Assess need for specialty bed   Positioning devices   Pressure redistribution bed/mattress (bed type)   Check visual cues for pain   Monitor skin under medical devices

## 2025-06-25 LAB
BACTERIA SPEC CULT: NORMAL
BACTERIA SPEC CULT: NORMAL
SERVICE CMNT-IMP: NORMAL
SERVICE CMNT-IMP: NORMAL

## 2025-07-08 NOTE — PROGRESS NOTES
Physician Progress Note      PATIENT:               NATTY LIEBERMAN  CSN #:                  [id number]  :                       1975  ADMIT DATE:       2025 9:56 AM  DISCH DATE:        2025 12:54 PM  RESPONDING  PROVIDER #:        Guerline Travis MD          QUERY TEXT:    Please clarify the patient?s nutritional status:    The clinical indicators include:  48yo, male, Acute hypoxic respiratory failure, Polysubstance abuse   IM \"Estimated body mass index is 19.1 kg/m? as calculated from the   following:  Height as of this encounter: 1.88 m (6' 2\").  Weight as of this encounter: 67.5 kg (148 lb 12.8 oz).\"  labs, imaging, regular diet, I&O  Options provided:  -- Underweight with BMI 19.1  -- Other - I will add my own diagnosis  -- Disagree - Not applicable / Not valid  -- Disagree - Clinically unable to determine / Unknown  -- Refer to Clinical Documentation Reviewer    PROVIDER RESPONSE TEXT:    This patient is underweight with a BMI of 19.1.    Query created by: Silvia Xie on 2025 11:36 AM      QUERY TEXT:    Acute respiratory failure is documented in the medical record  H&P. Please   provide additional clinical indicators supportive of your documentation. Or   please document if the diagnosis of acute respiratory failure has been ruled   out after study.    The clinical indicators include:  48yo, male, opioid withdrawal, Polysubstance abuse, copd exacerbation   H&P \"has been in [facility] for detox from fentanyl\", \"started having   mild shortness of breath since yesterday, brought to emergency room for   further evaluation\", \"Respirate max of 23, oxygen saturation 88% on room air,   presently on oxygen 2 L/min\", and \"Acute hypoxic respiratory failure. CT   findings of bullous emphysema, mild atelectasis, extensive mucus/debris's   throughout the lower lobe airways bilaterally. Discussed with pulmonary no   intervention required\" \"Thin built on oxygen 2 L/min, mild

## (undated) DEVICE — DRAPE,TOP,102X53,STERILE: Brand: MEDLINE

## (undated) DEVICE — SHEET, DRAPE, SPLIT, STERILE: Brand: MEDLINE

## (undated) DEVICE — SURGICAL PROCEDURE PACK BASIC ST FRANCIS

## (undated) DEVICE — KERLIX BANDAGE ROLL: Brand: KERLIX